# Patient Record
Sex: FEMALE | Race: WHITE | NOT HISPANIC OR LATINO | URBAN - METROPOLITAN AREA
[De-identification: names, ages, dates, MRNs, and addresses within clinical notes are randomized per-mention and may not be internally consistent; named-entity substitution may affect disease eponyms.]

---

## 2018-05-30 ENCOUNTER — EMERGENCY (EMERGENCY)
Facility: HOSPITAL | Age: 78
LOS: 1 days | Discharge: HOME | End: 2018-05-30
Attending: EMERGENCY MEDICINE | Admitting: EMERGENCY MEDICINE
Payer: MEDICARE

## 2018-05-30 VITALS
SYSTOLIC BLOOD PRESSURE: 152 MMHG | HEIGHT: 63 IN | WEIGHT: 201.94 LBS | OXYGEN SATURATION: 95 % | HEART RATE: 90 BPM | RESPIRATION RATE: 20 BRPM | TEMPERATURE: 98 F | DIASTOLIC BLOOD PRESSURE: 85 MMHG

## 2018-05-30 DIAGNOSIS — E03.9 HYPOTHYROIDISM, UNSPECIFIED: ICD-10-CM

## 2018-05-30 DIAGNOSIS — J98.8 OTHER SPECIFIED RESPIRATORY DISORDERS: ICD-10-CM

## 2018-05-30 DIAGNOSIS — R06.2 WHEEZING: ICD-10-CM

## 2018-05-30 DIAGNOSIS — Z88.0 ALLERGY STATUS TO PENICILLIN: ICD-10-CM

## 2018-05-30 DIAGNOSIS — R05 COUGH: ICD-10-CM

## 2018-05-30 DIAGNOSIS — E78.00 PURE HYPERCHOLESTEROLEMIA, UNSPECIFIED: ICD-10-CM

## 2018-05-30 DIAGNOSIS — Z79.899 OTHER LONG TERM (CURRENT) DRUG THERAPY: ICD-10-CM

## 2018-05-30 DIAGNOSIS — F17.210 NICOTINE DEPENDENCE, CIGARETTES, UNCOMPLICATED: ICD-10-CM

## 2018-05-30 LAB
ALBUMIN SERPL ELPH-MCNC: 4.5 G/DL — SIGNIFICANT CHANGE UP (ref 3.3–5)
ALP SERPL-CCNC: 83 U/L — SIGNIFICANT CHANGE UP (ref 40–120)
ALT FLD-CCNC: 21 U/L — SIGNIFICANT CHANGE UP (ref 10–45)
ANION GAP SERPL CALC-SCNC: 17 MMOL/L — SIGNIFICANT CHANGE UP (ref 5–17)
AST SERPL-CCNC: 23 U/L — SIGNIFICANT CHANGE UP (ref 10–40)
BASOPHILS NFR BLD AUTO: 0.1 % — SIGNIFICANT CHANGE UP (ref 0–2)
BILIRUB SERPL-MCNC: 0.4 MG/DL — SIGNIFICANT CHANGE UP (ref 0.2–1.2)
BUN SERPL-MCNC: 28 MG/DL — HIGH (ref 7–23)
CALCIUM SERPL-MCNC: 9.4 MG/DL — SIGNIFICANT CHANGE UP (ref 8.4–10.5)
CHLORIDE SERPL-SCNC: 100 MMOL/L — SIGNIFICANT CHANGE UP (ref 96–108)
CO2 SERPL-SCNC: 24 MMOL/L — SIGNIFICANT CHANGE UP (ref 22–31)
CREAT SERPL-MCNC: 0.95 MG/DL — SIGNIFICANT CHANGE UP (ref 0.5–1.3)
EOSINOPHIL NFR BLD AUTO: 0 % — SIGNIFICANT CHANGE UP (ref 0–6)
GLUCOSE SERPL-MCNC: 115 MG/DL — HIGH (ref 70–99)
HCT VFR BLD CALC: 41.6 % — SIGNIFICANT CHANGE UP (ref 34.5–45)
HGB BLD-MCNC: 13.7 G/DL — SIGNIFICANT CHANGE UP (ref 11.5–15.5)
LYMPHOCYTES # BLD AUTO: 12 % — LOW (ref 13–44)
MCHC RBC-ENTMCNC: 30.2 PG — SIGNIFICANT CHANGE UP (ref 27–34)
MCHC RBC-ENTMCNC: 32.9 G/DL — SIGNIFICANT CHANGE UP (ref 32–36)
MCV RBC AUTO: 91.6 FL — SIGNIFICANT CHANGE UP (ref 80–100)
MONOCYTES NFR BLD AUTO: 4.3 % — SIGNIFICANT CHANGE UP (ref 2–14)
NEUTROPHILS NFR BLD AUTO: 83.6 % — HIGH (ref 43–77)
PLATELET # BLD AUTO: 336 K/UL — SIGNIFICANT CHANGE UP (ref 150–400)
POTASSIUM SERPL-MCNC: 4.5 MMOL/L — SIGNIFICANT CHANGE UP (ref 3.5–5.3)
POTASSIUM SERPL-SCNC: 4.5 MMOL/L — SIGNIFICANT CHANGE UP (ref 3.5–5.3)
PROT SERPL-MCNC: 8.7 G/DL — HIGH (ref 6–8.3)
RBC # BLD: 4.54 M/UL — SIGNIFICANT CHANGE UP (ref 3.8–5.2)
RBC # FLD: 13.5 % — SIGNIFICANT CHANGE UP (ref 10.3–16.9)
SODIUM SERPL-SCNC: 141 MMOL/L — SIGNIFICANT CHANGE UP (ref 135–145)
WBC # BLD: 12.9 K/UL — HIGH (ref 3.8–10.5)
WBC # FLD AUTO: 12.9 K/UL — HIGH (ref 3.8–10.5)

## 2018-05-30 PROCEDURE — 71046 X-RAY EXAM CHEST 2 VIEWS: CPT | Mod: 26

## 2018-05-30 PROCEDURE — 94640 AIRWAY INHALATION TREATMENT: CPT

## 2018-05-30 PROCEDURE — 99284 EMERGENCY DEPT VISIT MOD MDM: CPT | Mod: 25

## 2018-05-30 PROCEDURE — 80053 COMPREHEN METABOLIC PANEL: CPT

## 2018-05-30 PROCEDURE — 96374 THER/PROPH/DIAG INJ IV PUSH: CPT

## 2018-05-30 PROCEDURE — 99285 EMERGENCY DEPT VISIT HI MDM: CPT | Mod: 25

## 2018-05-30 PROCEDURE — 93005 ELECTROCARDIOGRAM TRACING: CPT

## 2018-05-30 PROCEDURE — 85025 COMPLETE CBC W/AUTO DIFF WBC: CPT

## 2018-05-30 PROCEDURE — 71046 X-RAY EXAM CHEST 2 VIEWS: CPT

## 2018-05-30 PROCEDURE — 93010 ELECTROCARDIOGRAM REPORT: CPT

## 2018-05-30 RX ORDER — IPRATROPIUM/ALBUTEROL SULFATE 18-103MCG
3 AEROSOL WITH ADAPTER (GRAM) INHALATION
Qty: 0 | Refills: 0 | Status: COMPLETED | OUTPATIENT
Start: 2018-05-30 | End: 2018-05-30

## 2018-05-30 RX ORDER — ALBUTEROL 90 UG/1
1 AEROSOL, METERED ORAL
Qty: 28 | Refills: 0
Start: 2018-05-30 | End: 2018-06-05

## 2018-05-30 RX ADMIN — Medication 3 MILLILITER(S): at 12:12

## 2018-05-30 RX ADMIN — Medication 3 MILLILITER(S): at 12:35

## 2018-05-30 RX ADMIN — Medication 125 MILLIGRAM(S): at 11:20

## 2018-05-30 RX ADMIN — Medication 3 MILLILITER(S): at 11:20

## 2018-05-30 NOTE — ED PROVIDER NOTE - MEDICAL DECISION MAKING DETAILS
76 yo female with 2 weeks of wheezing, cough, already has taken steroids and zpack without improvement.  will get cxr, labs, give steroids and nebs 76 yo female with 2 weeks of wheezing, cough, already has taken steroids and zpack without improvement.  will get cxr, labs, give steroids and nebs.  cxr clear, much clearer after nebs.  will give nebulizers and steroids, fu pmd

## 2018-05-30 NOTE — ED PROVIDER NOTE - OBJECTIVE STATEMENT
2 weeks of cough with yellow sputum, wheezing  went to urgent care, was given inhaler, steroids and zpack  finished all those but not feeling any better  no fever

## 2018-05-30 NOTE — ED ADULT TRIAGE NOTE - CHIEF COMPLAINT QUOTE
Patient c/o productive  cough with wheezing, upper back  and sob for 2 weeks . Denies any chest pain , fever nor chills . Was sent from urgent care for evaluation .

## 2018-09-12 NOTE — ED ADULT NURSE NOTE - CAS ELECT INFOMATION PROVIDED
Pt. Contacted , text was thru automated system .   ----- Message from Anny Guzman sent at 9/12/2018  8:19 AM CDT -----  Contact: Self  Patient got a text for an earlier appt     Please call back [phone] (home)       
DC instructions

## 2020-03-10 PROBLEM — E03.9 HYPOTHYROIDISM, UNSPECIFIED: Chronic | Status: ACTIVE | Noted: 2018-05-30

## 2020-03-10 PROBLEM — E78.00 PURE HYPERCHOLESTEROLEMIA, UNSPECIFIED: Chronic | Status: ACTIVE | Noted: 2018-05-30

## 2020-03-11 NOTE — DISCUSSION/SUMMARY
[de-identified] : The patient is a 79 year old woman presenting with\par \par Imaging was reviewed with the patient in detail.  All questions were answered appropriately.\par

## 2020-03-11 NOTE — PHYSICAL EXAM
[de-identified] : General: Well-nourished, well-developed, alert, and in no acute distress.\par Head: Normocephalic.\par Eyes: Pupils equal round reactive to light and accommodation, extraocular muscles intact, normal sclera.\par Nose: No nasal discharge.\par Cardiac: Regular rate. Extremities are warm and well perfused. Distal pulses are symmetric bilaterally.\par Respiratory: No labored breathing.\par Extremities: Sensation is intact distally bilaterally.  Distal pulses are symmetric bilaterally\par Neurologic: No focal deficits\par Skin: Normal skin color, texture, and turgor\par Psychiatric: Normal affect\par \par RIGHT KNEE:\par \par Inspection: no bruising, swelling, erythema\par Joint Effusion:no \par ROM: Knee Flexion 130-140 , Knee Extension 0\par Palpation:No pain at joint line, patellar tendon, MFC/LFC, Medial/Lateral Tibial Plateau\par Leg Length Discrepancy:no\par Patella: no apprehension\par Distal Pulses: normal\par Lower Extremity Strength:normal, 5/5 \par Lower Extremity Reflexes:normal, 2+\par Lower Extremity Sensation: normal\par \par Special Tests:\par [facility]:Negative \par [place]: Negative\par Anterior Drawer:Negative\par Anterior Lachman:Negative\par Posterior Drawer:Negative \par Varus/Valgus:Negative, no instability\par \par LEFT KNEE:\par \par Inspection: no bruising, swelling, erythema\par Joint Effusion:no \par ROM: Knee Flexion 130-140 , Knee Extension 0\par Palpation:No pain at joint line, patellar tendon, MFC/LFC, Medial/Lateral Tibial Plateau\par Leg Length Discrepancy:no\par Patella: no apprehension\par Distal Pulses: normal\par Lower Extremity Strength:normal, 5/5 \par Lower Extremity Reflexes:normal, 2+\par Lower Extremity Sensation: normal\par \par Special Tests:\par [facility]:Negative \par [place]: Negative\par Anterior Drawer:Negative\par Anterior Lachman:Negative\par Posterior Drawer:Negative \par Varus/Valgus:Negative, no instability\par  [de-identified] : Xray Bilateral Knees - Multiple views were reviewed with the patient in detail.  There is no acute fracture or dislocation.  There is no joint effusion.  Joint spaces are preserved.\par \par

## 2020-03-13 ENCOUNTER — APPOINTMENT (OUTPATIENT)
Dept: ORTHOPEDIC SURGERY | Facility: CLINIC | Age: 80
End: 2020-03-13

## 2021-09-05 ENCOUNTER — TRANSCRIPTION ENCOUNTER (OUTPATIENT)
Age: 81
End: 2021-09-05

## 2021-12-02 ENCOUNTER — TRANSCRIPTION ENCOUNTER (OUTPATIENT)
Age: 81
End: 2021-12-02

## 2022-10-05 ENCOUNTER — INPATIENT (INPATIENT)
Facility: HOSPITAL | Age: 82
LOS: 2 days | Discharge: HOME | End: 2022-10-08
Attending: STUDENT IN AN ORGANIZED HEALTH CARE EDUCATION/TRAINING PROGRAM | Admitting: STUDENT IN AN ORGANIZED HEALTH CARE EDUCATION/TRAINING PROGRAM

## 2022-10-05 VITALS
HEART RATE: 95 BPM | SYSTOLIC BLOOD PRESSURE: 128 MMHG | TEMPERATURE: 97 F | OXYGEN SATURATION: 98 % | WEIGHT: 207.01 LBS | DIASTOLIC BLOOD PRESSURE: 56 MMHG | RESPIRATION RATE: 17 BRPM | HEIGHT: 63 IN

## 2022-10-05 DIAGNOSIS — K29.71 GASTRITIS, UNSPECIFIED, WITH BLEEDING: ICD-10-CM

## 2022-10-05 DIAGNOSIS — K57.31 DIVERTICULOSIS OF LARGE INTESTINE WITHOUT PERFORATION OR ABSCESS WITH BLEEDING: ICD-10-CM

## 2022-10-05 DIAGNOSIS — K20.81 OTHER ESOPHAGITIS WITH BLEEDING: ICD-10-CM

## 2022-10-05 PROCEDURE — 99285 EMERGENCY DEPT VISIT HI MDM: CPT | Mod: GC

## 2022-10-05 NOTE — ED ADULT TRIAGE NOTE - HEIGHT IN INCHES
Already refused per previous tele encounter. Please refuse medication. Note added to pharmacy that medication needs to be refilled by PCP   3

## 2022-10-05 NOTE — ED ADULT TRIAGE NOTE - CHIEF COMPLAINT QUOTE
as per daughter "my mom is very weak we went to see her cardiologist and her hemoglobin is 7 and blood in stomach, [facility] and was told to come here" as per daughter "my mom is very weak we went to see her cardiologist and her hemoglobin is 7 and blood in stomach, [facility] and was told to come here" denies vomiting denies blood in stool denies trauma,

## 2022-10-06 LAB
ABO RH CONFIRMATION: SIGNIFICANT CHANGE UP
ALBUMIN SERPL ELPH-MCNC: 3.9 G/DL — SIGNIFICANT CHANGE UP (ref 3.5–5.2)
ALBUMIN SERPL ELPH-MCNC: 4 G/DL — SIGNIFICANT CHANGE UP (ref 3.5–5.2)
ALP SERPL-CCNC: 90 U/L — SIGNIFICANT CHANGE UP (ref 30–115)
ALP SERPL-CCNC: 96 U/L — SIGNIFICANT CHANGE UP (ref 30–115)
ALT FLD-CCNC: 11 U/L — SIGNIFICANT CHANGE UP (ref 0–41)
ALT FLD-CCNC: 12 U/L — SIGNIFICANT CHANGE UP (ref 0–41)
ANION GAP SERPL CALC-SCNC: 10 MMOL/L — SIGNIFICANT CHANGE UP (ref 7–14)
ANION GAP SERPL CALC-SCNC: 16 MMOL/L — HIGH (ref 7–14)
APTT BLD: 52.8 SEC — HIGH (ref 27–39.2)
AST SERPL-CCNC: 16 U/L — SIGNIFICANT CHANGE UP (ref 0–41)
AST SERPL-CCNC: 16 U/L — SIGNIFICANT CHANGE UP (ref 0–41)
BASOPHILS # BLD AUTO: 0.04 K/UL — SIGNIFICANT CHANGE UP (ref 0–0.2)
BASOPHILS # BLD AUTO: 0.04 K/UL — SIGNIFICANT CHANGE UP (ref 0–0.2)
BASOPHILS # BLD AUTO: 0.05 K/UL — SIGNIFICANT CHANGE UP (ref 0–0.2)
BASOPHILS NFR BLD AUTO: 0.4 % — SIGNIFICANT CHANGE UP (ref 0–1)
BASOPHILS NFR BLD AUTO: 0.5 % — SIGNIFICANT CHANGE UP (ref 0–1)
BASOPHILS NFR BLD AUTO: 0.5 % — SIGNIFICANT CHANGE UP (ref 0–1)
BILIRUB SERPL-MCNC: 0.3 MG/DL — SIGNIFICANT CHANGE UP (ref 0.2–1.2)
BILIRUB SERPL-MCNC: 1 MG/DL — SIGNIFICANT CHANGE UP (ref 0.2–1.2)
BLD GP AB SCN SERPL QL: SIGNIFICANT CHANGE UP
BUN SERPL-MCNC: 20 MG/DL — SIGNIFICANT CHANGE UP (ref 10–20)
BUN SERPL-MCNC: 23 MG/DL — HIGH (ref 10–20)
CALCIUM SERPL-MCNC: 8.5 MG/DL — SIGNIFICANT CHANGE UP (ref 8.4–10.5)
CALCIUM SERPL-MCNC: 9.2 MG/DL — SIGNIFICANT CHANGE UP (ref 8.4–10.5)
CHLORIDE SERPL-SCNC: 102 MMOL/L — SIGNIFICANT CHANGE UP (ref 98–110)
CHLORIDE SERPL-SCNC: 103 MMOL/L — SIGNIFICANT CHANGE UP (ref 98–110)
CHOLEST SERPL-MCNC: 162 MG/DL — SIGNIFICANT CHANGE UP
CO2 SERPL-SCNC: 25 MMOL/L — SIGNIFICANT CHANGE UP (ref 17–32)
CO2 SERPL-SCNC: 25 MMOL/L — SIGNIFICANT CHANGE UP (ref 17–32)
CREAT SERPL-MCNC: 0.8 MG/DL — SIGNIFICANT CHANGE UP (ref 0.7–1.5)
CREAT SERPL-MCNC: 0.8 MG/DL — SIGNIFICANT CHANGE UP (ref 0.7–1.5)
D DIMER BLD IA.RAPID-MCNC: 405 NG/ML DDU — HIGH (ref 0–230)
EGFR: 74 ML/MIN/1.73M2 — SIGNIFICANT CHANGE UP
EGFR: 74 ML/MIN/1.73M2 — SIGNIFICANT CHANGE UP
EOSINOPHIL # BLD AUTO: 0.11 K/UL — SIGNIFICANT CHANGE UP (ref 0–0.7)
EOSINOPHIL # BLD AUTO: 0.11 K/UL — SIGNIFICANT CHANGE UP (ref 0–0.7)
EOSINOPHIL # BLD AUTO: 0.14 K/UL — SIGNIFICANT CHANGE UP (ref 0–0.7)
EOSINOPHIL NFR BLD AUTO: 1.1 % — SIGNIFICANT CHANGE UP (ref 0–8)
EOSINOPHIL NFR BLD AUTO: 1.1 % — SIGNIFICANT CHANGE UP (ref 0–8)
EOSINOPHIL NFR BLD AUTO: 1.7 % — SIGNIFICANT CHANGE UP (ref 0–8)
GLUCOSE SERPL-MCNC: 111 MG/DL — HIGH (ref 70–99)
GLUCOSE SERPL-MCNC: 91 MG/DL — SIGNIFICANT CHANGE UP (ref 70–99)
HCT VFR BLD CALC: 23.6 % — LOW (ref 37–47)
HCT VFR BLD CALC: 25.1 % — LOW (ref 37–47)
HCT VFR BLD CALC: 27.2 % — LOW (ref 37–47)
HDLC SERPL-MCNC: 37 MG/DL — LOW
HGB BLD-MCNC: 6.6 G/DL — CRITICAL LOW (ref 12–16)
HGB BLD-MCNC: 7.5 G/DL — LOW (ref 12–16)
HGB BLD-MCNC: 7.9 G/DL — LOW (ref 12–16)
IMM GRANULOCYTES NFR BLD AUTO: 0.4 % — HIGH (ref 0.1–0.3)
IMM GRANULOCYTES NFR BLD AUTO: 0.5 % — HIGH (ref 0.1–0.3)
IMM GRANULOCYTES NFR BLD AUTO: 0.8 % — HIGH (ref 0.1–0.3)
INR BLD: 1.25 RATIO — SIGNIFICANT CHANGE UP (ref 0.65–1.3)
LIDOCAIN IGE QN: 169 U/L — HIGH (ref 7–60)
LIPID PNL WITH DIRECT LDL SERPL: 88 MG/DL — SIGNIFICANT CHANGE UP
LYMPHOCYTES # BLD AUTO: 1.8 K/UL — SIGNIFICANT CHANGE UP (ref 1.2–3.4)
LYMPHOCYTES # BLD AUTO: 1.89 K/UL — SIGNIFICANT CHANGE UP (ref 1.2–3.4)
LYMPHOCYTES # BLD AUTO: 18.8 % — LOW (ref 20.5–51.1)
LYMPHOCYTES # BLD AUTO: 2.07 K/UL — SIGNIFICANT CHANGE UP (ref 1.2–3.4)
LYMPHOCYTES # BLD AUTO: 21.2 % — SIGNIFICANT CHANGE UP (ref 20.5–51.1)
LYMPHOCYTES # BLD AUTO: 22.4 % — SIGNIFICANT CHANGE UP (ref 20.5–51.1)
MAGNESIUM SERPL-MCNC: 2.2 MG/DL — SIGNIFICANT CHANGE UP (ref 1.8–2.4)
MCHC RBC-ENTMCNC: 21.2 PG — LOW (ref 27–31)
MCHC RBC-ENTMCNC: 22.4 PG — LOW (ref 27–31)
MCHC RBC-ENTMCNC: 23 PG — LOW (ref 27–31)
MCHC RBC-ENTMCNC: 28 G/DL — LOW (ref 32–37)
MCHC RBC-ENTMCNC: 29 G/DL — LOW (ref 32–37)
MCHC RBC-ENTMCNC: 29.9 G/DL — LOW (ref 32–37)
MCV RBC AUTO: 75.6 FL — LOW (ref 81–99)
MCV RBC AUTO: 77 FL — LOW (ref 81–99)
MCV RBC AUTO: 77.1 FL — LOW (ref 81–99)
MONOCYTES # BLD AUTO: 0.67 K/UL — HIGH (ref 0.1–0.6)
MONOCYTES # BLD AUTO: 0.67 K/UL — HIGH (ref 0.1–0.6)
MONOCYTES # BLD AUTO: 0.81 K/UL — HIGH (ref 0.1–0.6)
MONOCYTES NFR BLD AUTO: 6.9 % — SIGNIFICANT CHANGE UP (ref 1.7–9.3)
MONOCYTES NFR BLD AUTO: 7 % — SIGNIFICANT CHANGE UP (ref 1.7–9.3)
MONOCYTES NFR BLD AUTO: 9.6 % — HIGH (ref 1.7–9.3)
NEUTROPHILS # BLD AUTO: 5.51 K/UL — SIGNIFICANT CHANGE UP (ref 1.4–6.5)
NEUTROPHILS # BLD AUTO: 6.79 K/UL — HIGH (ref 1.4–6.5)
NEUTROPHILS # BLD AUTO: 6.93 K/UL — HIGH (ref 1.4–6.5)
NEUTROPHILS NFR BLD AUTO: 65.3 % — SIGNIFICANT CHANGE UP (ref 42.2–75.2)
NEUTROPHILS NFR BLD AUTO: 69.6 % — SIGNIFICANT CHANGE UP (ref 42.2–75.2)
NEUTROPHILS NFR BLD AUTO: 72.2 % — SIGNIFICANT CHANGE UP (ref 42.2–75.2)
NON HDL CHOLESTEROL: 125 MG/DL — SIGNIFICANT CHANGE UP
NRBC # BLD: 0 /100 WBCS — SIGNIFICANT CHANGE UP (ref 0–0)
PLATELET # BLD AUTO: 322 K/UL — SIGNIFICANT CHANGE UP (ref 130–400)
PLATELET # BLD AUTO: 404 K/UL — HIGH (ref 130–400)
PLATELET # BLD AUTO: 427 K/UL — HIGH (ref 130–400)
POTASSIUM SERPL-MCNC: 4.2 MMOL/L — SIGNIFICANT CHANGE UP (ref 3.5–5)
POTASSIUM SERPL-MCNC: 4.5 MMOL/L — SIGNIFICANT CHANGE UP (ref 3.5–5)
POTASSIUM SERPL-SCNC: 4.2 MMOL/L — SIGNIFICANT CHANGE UP (ref 3.5–5)
POTASSIUM SERPL-SCNC: 4.5 MMOL/L — SIGNIFICANT CHANGE UP (ref 3.5–5)
PROT SERPL-MCNC: 6.4 G/DL — SIGNIFICANT CHANGE UP (ref 6–8)
PROT SERPL-MCNC: 6.6 G/DL — SIGNIFICANT CHANGE UP (ref 6–8)
PROTHROM AB SERPL-ACNC: 14.4 SEC — HIGH (ref 9.95–12.87)
RBC # BLD: 3.12 M/UL — LOW (ref 4.2–5.4)
RBC # BLD: 3.26 M/UL — LOW (ref 4.2–5.4)
RBC # BLD: 3.53 M/UL — LOW (ref 4.2–5.4)
RBC # FLD: 17.3 % — HIGH (ref 11.5–14.5)
RBC # FLD: 18.4 % — HIGH (ref 11.5–14.5)
RBC # FLD: 19 % — HIGH (ref 11.5–14.5)
SARS-COV-2 RNA SPEC QL NAA+PROBE: SIGNIFICANT CHANGE UP
SODIUM SERPL-SCNC: 138 MMOL/L — SIGNIFICANT CHANGE UP (ref 135–146)
SODIUM SERPL-SCNC: 143 MMOL/L — SIGNIFICANT CHANGE UP (ref 135–146)
TRIGL SERPL-MCNC: 181 MG/DL — HIGH
TROPONIN T SERPL-MCNC: <0.01 NG/ML — SIGNIFICANT CHANGE UP
TSH SERPL-MCNC: 0.04 UIU/ML — LOW (ref 0.27–4.2)
WBC # BLD: 8.43 K/UL — SIGNIFICANT CHANGE UP (ref 4.8–10.8)
WBC # BLD: 9.6 K/UL — SIGNIFICANT CHANGE UP (ref 4.8–10.8)
WBC # BLD: 9.76 K/UL — SIGNIFICANT CHANGE UP (ref 4.8–10.8)
WBC # FLD AUTO: 8.43 K/UL — SIGNIFICANT CHANGE UP (ref 4.8–10.8)
WBC # FLD AUTO: 9.6 K/UL — SIGNIFICANT CHANGE UP (ref 4.8–10.8)
WBC # FLD AUTO: 9.76 K/UL — SIGNIFICANT CHANGE UP (ref 4.8–10.8)

## 2022-10-06 PROCEDURE — 93010 ELECTROCARDIOGRAM REPORT: CPT

## 2022-10-06 PROCEDURE — 99221 1ST HOSP IP/OBS SF/LOW 40: CPT

## 2022-10-06 PROCEDURE — 99223 1ST HOSP IP/OBS HIGH 75: CPT

## 2022-10-06 PROCEDURE — 71046 X-RAY EXAM CHEST 2 VIEWS: CPT | Mod: 26

## 2022-10-06 RX ORDER — LEVOTHYROXINE SODIUM 125 MCG
0 TABLET ORAL
Qty: 0 | Refills: 0 | DISCHARGE

## 2022-10-06 RX ORDER — LEVOTHYROXINE SODIUM 125 MCG
125 TABLET ORAL DAILY
Refills: 0 | Status: DISCONTINUED | OUTPATIENT
Start: 2022-10-06 | End: 2022-10-08

## 2022-10-06 RX ORDER — AMLODIPINE BESYLATE 2.5 MG/1
5 TABLET ORAL DAILY
Refills: 0 | Status: DISCONTINUED | OUTPATIENT
Start: 2022-10-06 | End: 2022-10-08

## 2022-10-06 RX ORDER — PANTOPRAZOLE SODIUM 20 MG/1
80 TABLET, DELAYED RELEASE ORAL ONCE
Refills: 0 | Status: COMPLETED | OUTPATIENT
Start: 2022-10-06 | End: 2022-10-06

## 2022-10-06 RX ORDER — ROSUVASTATIN CALCIUM 5 MG/1
1 TABLET ORAL
Qty: 0 | Refills: 0 | DISCHARGE

## 2022-10-06 RX ORDER — PANTOPRAZOLE SODIUM 20 MG/1
40 TABLET, DELAYED RELEASE ORAL
Refills: 0 | Status: DISCONTINUED | OUTPATIENT
Start: 2022-10-06 | End: 2022-10-07

## 2022-10-06 RX ORDER — ATORVASTATIN CALCIUM 80 MG/1
40 TABLET, FILM COATED ORAL AT BEDTIME
Refills: 0 | Status: DISCONTINUED | OUTPATIENT
Start: 2022-10-06 | End: 2022-10-08

## 2022-10-06 RX ORDER — CHLORHEXIDINE GLUCONATE 213 G/1000ML
1 SOLUTION TOPICAL
Refills: 0 | Status: DISCONTINUED | OUTPATIENT
Start: 2022-10-06 | End: 2022-10-08

## 2022-10-06 RX ORDER — PANTOPRAZOLE SODIUM 20 MG/1
8 TABLET, DELAYED RELEASE ORAL
Qty: 80 | Refills: 0 | Status: DISCONTINUED | OUTPATIENT
Start: 2022-10-06 | End: 2022-10-06

## 2022-10-06 RX ORDER — METOPROLOL TARTRATE 50 MG
50 TABLET ORAL DAILY
Refills: 0 | Status: DISCONTINUED | OUTPATIENT
Start: 2022-10-06 | End: 2022-10-08

## 2022-10-06 RX ORDER — FERROUS SULFATE 325(65) MG
325 TABLET ORAL
Refills: 0 | Status: DISCONTINUED | OUTPATIENT
Start: 2022-10-06 | End: 2022-10-08

## 2022-10-06 RX ORDER — SOD SULF/SODIUM/NAHCO3/KCL/PEG
4000 SOLUTION, RECONSTITUTED, ORAL ORAL ONCE
Refills: 0 | Status: COMPLETED | OUTPATIENT
Start: 2022-10-06 | End: 2022-10-06

## 2022-10-06 RX ADMIN — Medication 4000 MILLILITER(S): at 10:57

## 2022-10-06 RX ADMIN — PANTOPRAZOLE SODIUM 40 MILLIGRAM(S): 20 TABLET, DELAYED RELEASE ORAL at 21:23

## 2022-10-06 RX ADMIN — Medication 50 MILLIGRAM(S): at 10:38

## 2022-10-06 RX ADMIN — PANTOPRAZOLE SODIUM 80 MILLIGRAM(S): 20 TABLET, DELAYED RELEASE ORAL at 02:40

## 2022-10-06 RX ADMIN — PANTOPRAZOLE SODIUM 10 MG/HR: 20 TABLET, DELAYED RELEASE ORAL at 07:03

## 2022-10-06 RX ADMIN — AMLODIPINE BESYLATE 5 MILLIGRAM(S): 2.5 TABLET ORAL at 10:38

## 2022-10-06 RX ADMIN — Medication 325 MILLIGRAM(S): at 18:01

## 2022-10-06 NOTE — ED PROVIDER NOTE - NS ED ROS FT
Constitutional: +Generalized weakness. No fevers, chills, or malaise.  HEENT: No headache, visual changes, eye pain, hearing changes, running nose, or sore throat.  Cardiac:  +chest pain, +diaphoresis, +leg edema. No leg pain.  Respiratory:  +SOB, +cough. No respiratory distress, or hemoptysis.  GI:  No nausea, vomiting, diarrhea, or abdominal pain.  :  No dysuria, frequency, or urgency.  MS:  No myalgia, joint pain or back pain.  Neuro: +dizziness. No LOC, paralysis, or N/T.  Skin:  No skin rash.

## 2022-10-06 NOTE — ED PROCEDURE NOTE - ATTENDING CONTRIBUTION TO CARE
I was present for and supervised the key/critical aspects of the procedures performed during the care of the patient.  I personally supervised the study. I reviewed the images and interpretation by the ACP/Resident and have edited where appropriate.

## 2022-10-06 NOTE — ED ADULT NURSE REASSESSMENT NOTE - NS ED NURSE REASSESS COMMENT FT1
Pt continuing to take the anahy, tolerating well.  Using bathroom regularly.   No distress noted, ambulating well.

## 2022-10-06 NOTE — H&P ADULT - ATTENDING COMMENTS
EKG reviewed normal sinus rhythm  CXR reviewed no bilateral opacity or effusion    PHYSICAL EXAM:  General Appearance: NAD, pallor, normal for age and gender. 	  Neck: Normal JVP, no bruit.   Eyes: Conjunctiva clear, Extra Ocular muscles intact. No scleral icterus.  ENMT: Moist oral mucosa. No oral lesion.  Cardiovascular: Irregularly irregular, S1 S2, No JVD, No murmurs.  Respiratory: Lungs clear to auscultation. No wheezes, rales or rhonchi.  Psychiatry: Alert and oriented x 3, Mood & affect appropriate.  Gastrointestinal:  Soft, Non-tender, Non-distended.  Neurologic: Non-focal deficits.  Musculoskeletal/ extremities: Normal range of motion, No clubbing, cyanosis or edema.  Vascular: Peripheral pulses palpable bilaterally.  Skin/Integumen: No rashes, No ecchymoses, No cyanosis.    # GI Bleed, likely upper  # Anemia due to blood loss  - transfuse 1 unit prbc  - keep active type and screen and Hb>8  - GI evaluation for EGD/Colonoscopy  - c/w IV PPI    # Paroxysmal Afib, rate controlled   - xarelto on hold due to GI bleed  - c/w toprol xl     # HTN, stable  - c/w amlodipine, toprol xl daily    # Hypothyroidism   - c/w levothyroxine 125 mcg daily    # DVT prophylaxis - on SCD  # GI prophylaxis - on protonix  # Code status - Full Code

## 2022-10-06 NOTE — H&P ADULT - HISTORY OF PRESENT ILLNESS
81 year old woman with past medical history of A fib (on xeralto), HTN, and hypothyroidism presents for anemia. For the past 2 months the patient was feeling short of breath and fatigued. Normally she can walk long distances but recently she becomes short of breathe and diaphoretic after walking even 2 steps. She also reports dark stools. On Monday, her cardiologist called her to tell her that her Hgb was 7 and to stop the Xeralto and prescribed Ferrous Sulfate for her. She denies fever, chest pain, nausea, vomiting, diarrhea, or constipation. She does endorse a cough productive of yellow sputum.    In the ED:  Vitals: /56, HR 95, RR 17, T 96.7, SpO2 98% RA  Labs: WBC 9.76, Hgb 6.6, MCV 75.6, Cr 0.8, BUN 23, Lipase 169  Patient receiving 1 unit PRBC during time of interview and was given 80 mg Protonix IV and started on Protonix drip. Digital rectal exam was positive for blood.

## 2022-10-06 NOTE — ED ADULT NURSE REASSESSMENT NOTE - NS ED NURSE REASSESS COMMENT FT1
Pt received from outgoing shift at 1900. Pt is calmly resting in bed at this time. Pt is alert and oriented x3. No s/s of respiratory distress. Pt is able to make all needs known. No further complaints at this time. Pt is admitted to Medicine service. Pending bed status. Safety and comfort measures in place. Fall precautions in place as per hospital policy. Will continue to monitor and assess for changes.

## 2022-10-06 NOTE — H&P ADULT - ASSESSMENT
81 year old woman with past medical history of A fib (on xeralto), HTN, and hypothyroidism presents for anemia.    #Microcytic anemia (Hgb 6.6 on admission)   - Given patient's history of black stools and positive DR exam, the source of bleeding is most likely GI  - MCV 75.6 also suggests iron deficiency anemia which would be consistent with GI bleed.   - Patient will likely need to have colonoscopy   - currently receiving 1 unit of PRBC  - fu CBC at 11 AM   - fu GI recs   - Hold Xeralto  - c/w ferrous sulfate.  - c/w protonix (consider changing to BID as evidence does not show benefit of drip over BID).    #Chronic Afib   - Hold Xeralto in setting of anemia    #HTN  - C/w amlodipine 5 mg daily    #Hypothyroidism   - c/w levothyroxine 125 ug daily    DVT PPX: sequentials   GI PPX: Protonix  Activity: Ambulate as tolerated  Diet: Regular  Code: Patient asked for more time to think about it.

## 2022-10-06 NOTE — ED ADULT NURSE NOTE - OBJECTIVE STATEMENT
as per daughter "my mom is very weak we went to see her cardiologist and her hemoglobin is 7 and blood in stomach, [facility] and [facility] to come here" denies vomiting denies blood in stool denies trauma,

## 2022-10-06 NOTE — ED PROVIDER NOTE - NSICDXPASTMEDICALHX_GEN_ALL_CORE_FT
PAST MEDICAL HISTORY:  Atrial fibrillation     HTN (hypertension)     Hypercholesteremia     Hypothyroid

## 2022-10-06 NOTE — ED PROVIDER NOTE - OBJECTIVE STATEMENT
82 y/o female, with PMHx of HTN, Afib on Xarelto, hypothyroidism, presents to the ED with worsening generalized weakness onset 2 months ago after starting Xarelto for A-fib. The patient was called by her cardiologist 2 days ago and was told her hemoglobin was 7 and the Xarelto was stopped then. The patient also endorses recent SOB with minimal exertion, black stools, diaphoresis, dizziness, short sharp episodes of chest pain, dry cough, and LE swelling. Denies fever, chills, nausea, vomiting, diarrhea, abd pain, LE pain. 82 y/o female, with PMHx of HTN, Afib on Xarelto, hypothyroidism, presents to the ED with worsening generalized weakness onset 2 months ago after starting Xarelto for A-fib. The patient was called by her cardiologist ([facility]) 2 days ago and was told her hemoglobin was 7 and the Xarelto was stopped then. The patient also endorses recent SOB with minimal exertion, black stools, diaphoresis, dizziness, short sharp episodes of chest pain, dry cough, and LE swelling. Denies fever, chills, nausea, vomiting, diarrhea, abd pain, LE pain.

## 2022-10-06 NOTE — H&P ADULT - NSHPLABSRESULTS_GEN_ALL_CORE
LABS:                        7.9    9.60  )-----------( 427      ( 06 Oct 2022 11:26 )             27.2     10-06    143  |  102  |  20  ----------------------------<  111<H>  4.5   |  25  |  0.8    Ca    9.2      06 Oct 2022 11:26  Mg     2.2     10-06    TPro  6.6  /  Alb  3.9  /  TBili  1.0  /  DBili  x   /  AST  16  /  ALT  12  /  AlkPhos  96  10-06    PT/INR - ( 06 Oct 2022 02:05 )   PT: 14.40 sec;   INR: 1.25 ratio         PTT - ( 06 Oct 2022 02:05 )  PTT:52.8 sec      RADIOLOGY & ADDITIONAL TESTS:  < from: Xray Chest 2 Views PA/Lat (10.06.22 @ 01:33) >    Impression:    No acute cardiopulmonary abnormality.    1.1 cm nodular opacity at the left upper lung, stable since May 2018.

## 2022-10-06 NOTE — ED ADULT NURSE NOTE - DO YOU HAVE ACCESS TO A FIREARM WITHIN OR OUTSIDE YOUR HOUSEHOLD?
----- Message from Dominic Wynn MD sent at 2/6/2017  3:14 PM CST -----  Please advise patient that his kidney function is rising badly.  Check to see if he has already been advised of this result but I think he should go to the ER for further evaluation. This should not wait for tomorrow.   No

## 2022-10-06 NOTE — CONSULT NOTE ADULT - SUBJECTIVE AND OBJECTIVE BOX
Cardiologist: Dr. Fuchs    HPI:  81 year old woman with past medical history of A fib (on xeralto), HTN, and hypothyroidism presents for anemia. For the past 2 months the patient was feeling short of breath and fatigued. Normally she can walk long distances but recently she becomes short of breathe and diaphoretic after walking even 2 steps. She also reports dark stools. On Monday, her cardiologist called her to tell her that her Hgb was 7 and to stop the Xeralto and prescribed Ferrous Sulfate for her. She denies fever, chest pain, nausea, vomiting, diarrhea, or constipation. She does endorse a cough productive of yellow sputum.    In the ED:  Vitals: /56, HR 95, RR 17, T 96.7, SpO2 98% RA  Labs: WBC 9.76, Hgb 6.6, MCV 75.6, Cr 0.8, BUN 23, Lipase 169  Patient receiving 1 unit PRBC during time of interview and was given 80 mg Protonix IV and started on Protonix drip. Digital rectal exam was positive for blood.  (06 Oct 2022 08:10)    Cardiology consulted for pre-endoscopy risk stratification in the setting of GI bleed and patient taking Xarelto. Patient denies chest pain, but endorses some palpitations last night, likely 2/2 to Afib. Patient uses two pillows to sleep at night, denies PND and orthopnea. Endorses LE edema towards the end of the day, none appreciated today on physical exam. Per patient, she underwent stress test 1 month ago with normal results, as well as echo on 10/3, which was also unremarkable. She attends regular followup with Dr. Fuchs.      PAST MEDICAL & SURGICAL HISTORY  Hypercholesteremia    Hypothyroid    Atrial fibrillation    HTN (hypertension)    Appendectomy in remote past        FAMILY HISTORY:  FAMILY HISTORY:    [ ] no pertinent family history of premature cardiovascular disease in first degree relatives.  Mother:   Father:   Siblings:     SOCIAL HISTORY:  []smoker  []Alcohol  []Drug    ALLERGIES:  penicillins (Rash)      MEDICATIONS:  MEDICATIONS  (STANDING):  amLODIPine   Tablet 5 milliGRAM(s) Oral daily  atorvastatin 40 milliGRAM(s) Oral at bedtime  bisacodyl 20 milliGRAM(s) Oral at bedtime  chlorhexidine 2% Cloths 1 Application(s) Topical <User Schedule>  ferrous    sulfate 325 milliGRAM(s) Oral two times a day  levothyroxine 125 MICROGram(s) Oral daily  metoprolol succinate ER 50 milliGRAM(s) Oral daily  pantoprazole  Injectable 40 milliGRAM(s) IV Push two times a day    MEDICATIONS  (PRN):      HOME MEDICATIONS:  Home Medications:  amLODIPine 5 mg oral tablet: 1 tab(s) orally once a day (06 Oct 2022 00:53)  metoprolol succinate 50 mg oral tablet, extended release: 1 tab(s) orally once a day (06 Oct 2022 00:53)      VITALS:   T(F): 97.4 (10-06 @ 09:17), Max: 98.1 (10-06 @ 06:11)  HR: 84 (10-06 @ 09:17) (84 - 96)  BP: 140/58 (10-06 @ 09:17) (108/59 - 140/58)  BP(mean): 89 (10-06 @ 06:38) (89 - 93)  RR: 18 (10-06 @ 09:17) (17 - 20)  SpO2: 99% (10-06 @ 09:17) (96% - 99%)    I&O's Summary      REVIEW OF SYSTEMS:    Negative except as mentioned in HPI    CONSTITUTIONAL: Weakness  EYES: No visual changes  ENT: No vertigo or throat pain   NECK: No pain or stiffness  RESPIRATORY: No cough, no hemoptysis. Patient endorses some SOB and wheezing, not at present.  CARDIOVASCULAR: No chest pain, endorses palpitations yesterday  GASTROINTESTINAL: No abdominal or epigastric pain. No nausea, vomiting, or hematemesis. Endorses melanotic stool.  NEUROLOGICAL: No numbness or weakness  SKIN: No itching, no rashes  MSK: FROM x4    PHYSICAL EXAM:  NEURO: Awake , alert and oriented  GEN: Not in acute distress  NECK: No JVD  LUNGS: Clear to auscultation bilaterally   CARDIOVASCULAR: S1/S2 present, RRR , systolic murmur, no carotid bruits,  + PP bilaterally  ABD: Soft, non-tender, non-distended, +BS  EXT: No BERNIE  SKIN: Intact    LABS:                        6.6    9.76  )-----------( 404      ( 06 Oct 2022 02:05 )             23.6     10-06    138  |  103  |  23<H>  ----------------------------<  91  4.2   |  25  |  0.8    Ca    8.5      06 Oct 2022 02:05    TPro  6.4  /  Alb  4.0  /  TBili  0.3  /  DBili  x   /  AST  16  /  ALT  11  /  AlkPhos  90  10-06    PT/INR - ( 06 Oct 2022 02:05 )   PT: 14.40 sec;   INR: 1.25 ratio         PTT - ( 06 Oct 2022 02:05 )  PTT:52.8 sec  Troponin T, Serum: <0.01 ng/mL (10-06-22 @ 02:05)    CARDIAC MARKERS ( 06 Oct 2022 02:05 )  x     / <0.01 ng/mL / x     / x     / x            Troponin trend:    see above      RADIOLOGY:  -CXR: < from: Xray Chest 2 Views PA/Lat (10.06.22 @ 01:33) >  Impression:    No acute cardiopulmonary abnormality.    1.1 cm nodular opacity at the left upper lung, stable since May 2018.    < end of copied text >    ECG: < from: 12 Lead ECG (10.06.22 @ 01:34) >  Ventricular Rate 91 BPM    QRS Duration 76 ms    Q-T Interval 376 ms    QTC Calculation(Bazett) 462 ms    R Axis 14 degrees    T Axis -31 degrees    Diagnosis Line Atrial fibrillation  Low voltage QRS  Septal infarct , age undetermined  Abnormal ECG    Confirmed by Behuria, Supreeti ([id number]) on 10/6/2022 8:09:44 AM    < end of copied text >      TELEMETRY EVENTS:  Afib

## 2022-10-06 NOTE — H&P ADULT - NSHPPHYSICALEXAM_GEN_ALL_CORE
LOS:     VITALS:   T(C): 36.7 (10-06-22 @ 06:38), Max: 36.7 (10-06-22 @ 06:11)  HR: 88 (10-06-22 @ 06:38) (88 - 96)  BP: 130/68 (10-06-22 @ 06:38) (108/59 - 130/68)  RR: 20 (10-06-22 @ 06:38) (17 - 20)  SpO2: 99% (10-06-22 @ 06:38) (96% - 99%)    GENERAL: NAD, lying in bed comfortably  HEAD:  Atraumatic, Normocephalic  EYES: EOMI, PERRLA, conjunctiva and sclera clear  ENT: Moist mucous membranes  NECK: Supple, No JVD  CHEST/LUNG: Clear to auscultation bilaterally; No rales, rhonchi, wheezing, or rubs. Unlabored respirations  HEART: Irregularly irregular rhythm  ABDOMEN: BSx4; Soft, nontender, nondistended  EXTREMITIES:  2+ Peripheral Pulses, brisk capillary refill. No clubbing, cyanosis, or edema  NERVOUS SYSTEM:  A&Ox3, no focal deficits   SKIN: No rashes or lesions

## 2022-10-06 NOTE — CONSULT NOTE ADULT - ASSESSMENT
#Afib  #HTN  #Hypothyroidism    81F with PMH of Afib, HTN, hypothyroidism presenting with melena and anemia ISO Afib on Xarelto.    Cardiology consulted for pre-endoscopy risk stratification in the setting of GI bleed and patient taking Xarelto.    EKG: Diagnosis Line Atrial fibrillation  Low voltage QRS  Septal infarct , age undetermined  Abnormal ECG    ECHO: Patient reports having had echo on 10/3, however repeat echo is recommended    METS: <4    RCRI: 0    -Patient is at mild-moderate risk for MACE  -Repeat echo recommended  -Patient is cleared for EGD/colon from cardiac perspective    *********INCOMPLETE STUDENT NOTE************ 81F with PMH of Afib (on Xarelto), HTN, hypothyroidism admitted for melena with acute blood loss anemia.  Cardiology consulted for pre-endoscopy risk stratification in the setting of GI bleed and patient taking Xarelto.    #Upper GI bleed iso Xarelto  #Chronic Afib  #HTN  #Hypothyroidism    Patient is hemodynamically stable.    Denies CP, SOB, light-headedness, palpitations.   Troponin < 0.01  EKG: Afib, no acute ST changes  ECHO: Patient reports having had echo on 10/3  METS: <4  RCRI: 0    -Patient is at mild-moderate risk for MACE  -Repeat echo recommended  -Patient is cleared for EGD/colon from cardiac perspective    *********INCOMPLETE STUDENT NOTE************ 81F with PMH of Afib (on Xarelto), HTN, hypothyroidism admitted for melena with acute blood loss anemia.  Cardiology consulted for pre-endoscopy risk stratification in the setting of GI bleed and patient taking Xarelto.    #Upper GI bleed iso Xarelto  #Chronic Afib  #HTN  #Hypothyroidism  hemodynamically stable.  Denies CP, SOB, light-headedness, palpitations.   Troponin <0.01  EKG: Afib, no acute ST changes  ECHO: Patient reports having had echo on 10/3 at Dr. Fuchs's office  --> pls get records from outpt clinic  METS: >4  RCRI: 0  Patient is at mild-moderate risk for MACE

## 2022-10-06 NOTE — CONSULT NOTE ADULT - ASSESSMENT
81 year old woman with past medical history of A fib (on xeralto), HTN, and hypothyroidism presents for anemia. For the past 2 months the patient was feeling short of breath and fatigued. pt started on xarelto 2 months ago, reports recurrent dark stool. was called by her cardiologist to stop Xarelto as hemoglobin dropped to 7.    # Anemia with a concern of GI loss R/O PUD, AVMs, malignancy, or other bleeding lesions     - Reports dark stool at home, [place] brown stool today   - Hgb 6.6, MCV 75.6, Cr 0.8, BUN 23, Lipase 169 (baseline 12)  - no evidence of overt bleeding   - no FH of GI cancers  - uses Aleeve PRN  - 1.25  - last dose xarelto two days ago, GFR 74   - receiving one PRBC    RECOMMENDATIONS  - monitor CBC  - active type and screen   - 2 large IVs   - transfuse to keep hemoglobin > 8   - Cardiology risk stratification for EGD/colonoscopy tomorrow   - PPI IV BID   - EGD/colonoscopy tomorrow   - Please correct electrolytes (Target Na = 135-145 | Mg = 1.7-2.2 | K = 3.5-5)  - D/C AM Labs unless clinically required  - NPO after midnight    Prep Instructions:     - Today: Please ensure patient is on CLEAR LIQUID DIET and ORDER bowel prep - 1 Gallon of Golytely, dulcolax 20mg at night   - Night Before Procedure - Please keep patient NPO after midnight  - Day of Procedure - Please call [phone] at 7am to discuss quality of bowel movements and possible additional prep instructions. (Goal for bowel movement: clear watery stools with minimal blood or brown stools)      81 year old woman with past medical history of A fib (on xeralto), HTN, and hypothyroidism presents for anemia. For the past 2 months the patient was feeling short of breath and fatigued. pt started on xarelto 2 months ago, reports recurrent dark stool. was called by her cardiologist to stop Xarelto as hemoglobin dropped to 7.    # Acute Anemia  with a concern of GI bleed R/O PUD, AVMs, malignancy, or other bleeding lesions   # History of black stool, rule out melena     - Reports dark stool at home, [place]A brown stool today   - Hgb 6.6, MCV 75.6, Cr 0.8, BUN 23, Lipase 169 (baseline 12)  - no evidence of overt bleeding now   - no FH of GI cancers  - uses Aleeve PRN  - last dose Xarelto two days ago, GFR 74   - receiving one PRBC    RECOMMENDATIONS  - monitor CBC  - active type and screen   - 2 large IVs   - transfuse to keep hemoglobin > 8   - Cardiology risk stratification for EGD/colonoscopy tomorrow   - PPI IV BID   - EGD/colonoscopy tomorrow   - Please correct electrolytes (Target Na = 135-145 | Mg = 1.7-2.2 | K = 3.5-5)  - D/C AM Labs unless clinically required  - NPO after midnight    Prep Instructions:     - Today: Please ensure patient is on CLEAR LIQUID DIET and ORDER bowel prep - 1 Gallon of Golytely, dulcolax 20mg at night   - Night Before Procedure - Please keep patient NPO after midnight  - Day of Procedure - Please call [phone] at 7am to discuss quality of bowel movements and possible additional prep instructions. (Goal for bowel movement: clear watery stools with minimal blood or brown stools)

## 2022-10-06 NOTE — ED PROVIDER NOTE - PHYSICAL EXAMINATION
GENERAL: Well-developed; well-nourished; in no acute distress.   SKIN: warm, dry  HEAD: Normocephalic; atraumatic.  EYES: PERRLA, EOMI, no conjunctival erythema  ENT: No nasal discharge; airway clear.  NECK: Supple; non tender.  CARD: S1, S2 normal; no murmurs, gallops, or rubs. Regular rate, irregular rhythm    RESP: LCTAB; No wheezes, rales, rhonchi, or stridor.  ABD: soft, nontender, and nondistended  EXT: Normal ROM.  No LE TTP or edema bilaterally.  NEURO: Alert, oriented, CN II-XII intact, normal strength, normal sensation, normal motor, finger-to-nose and heel-to-shin negative, no pronator drift, no facial droop  PSYCH: Cooperative, appropriate. GENERAL: Well-developed; well-nourished; in no acute distress.   SKIN: warm, dry  HEAD: Normocephalic; atraumatic.  EYES: PERRLA, EOMI, no conjunctival erythema  ENT: No nasal discharge; airway clear.  NECK: Supple; non tender.  CARD: S1, S2 normal; no murmurs, gallops, or rubs. Regular rate, irregular rhythm    RESP: LCTAB; No wheezes, rales, rhonchi, or stridor.  ABD: soft, nontender, and nondistended. AUGUSTA: dark brown stool   EXT: Normal ROM.  No LE TTP or edema bilaterally.  NEURO: Alert, oriented, CN II-XII intact, normal strength, normal sensation, normal motor, finger-to-nose and heel-to-shin negative, no pronator drift, no facial droop  PSYCH: Cooperative, appropriate. GENERAL: Well-developed; well-nourished; in no acute distress.   SKIN: warm, dry  HEAD: Normocephalic; atraumatic.  EYES: PERRLA, EOMI, conjunctival pallor  ENT: No nasal discharge; airway clear.  NECK: Supple; non tender.  CARD: S1, S2 normal; no murmurs, gallops, or rubs. Regular rate, irregular rhythm    RESP: LCTAB; No wheezes, rales, rhonchi, or stridor.  ABD: soft, nontender, and nondistended. AUGUSTA: dark brown stool   EXT: Normal ROM.  No LE TTP or edema bilaterally.  NEURO: Alert, oriented, CN II-XII intact, normal strength, normal sensation, normal motor, finger-to-nose and heel-to-shin negative, no pronator drift, no facial droop  PSYCH: Cooperative, appropriate. GENERAL: Well-developed; well-nourished; in no acute distress.   SKIN: warm, dry  HEAD: Normocephalic; atraumatic.  EYES: PERRLA, EOMI, conjunctival pallor  ENT: No nasal discharge; airway clear.  NECK: Supple; non tender.  CARD: S1, S2 normal; no murmurs, gallops, or rubs. Regular rate, irregular rhythm    RESP: LCTAB; No wheezes, rales, rhonchi, or stridor.  ABD: soft, nontender, and nondistended. AUGUSTA: dark brown stool   EXT: Normal ROM.  No LE TTP or edema bilaterally.  NEURO: Alert, oriented, CN II-XII intact, 5/5 strength bilateral UE and LE, normal sensation, normal motor, finger-to-nose and heel-to-shin negative, no pronator drift, no facial droop  PSYCH: Cooperative, appropriate

## 2022-10-06 NOTE — CONSULT NOTE ADULT - ATTENDING COMMENTS
I edited the note
Agree with assessment and plan, as documented above. In brief, Ms Mcgee is a 81yoF with PMHx of pAF on xarelto, HTN, and hypothyroidism who presented with fatigue, dyspnea, and melena. She was found to have microcytic anemia with hb 6.6. Cardiology consulted for pre-op evaluation prior to EGD/colonoscopy. Based on the RCRI, she is a class I low risk of 30 day death, MI or cardiac arrest from her upcoming low risk procedure. Her functional capacity was limited due to dyspnea from acute blood loss anemia. She currently reports improved breathing now that she has had pRBC transfusions. Furthermore, she reports recently normal TTE and pharm stress test. Thus, she does not require any additional cardiac testing prior to her procedures tomorrow.

## 2022-10-06 NOTE — CONSULT NOTE ADULT - SUBJECTIVE AND OBJECTIVE BOX
Gastroenterology Consultation:    Patient is a 81y old  Female who presents with a chief complaint of Anemia (06 Oct 2022 08:10)    Admitted on: 10-06-22    HPI:  81 year old woman with past medical history of A fib (on xeralto), HTN, and hypothyroidism presents for anemia. For the past 2 months the patient was feeling short of breath and fatigued. Normally she can walk long distances but recently she becomes short of breathe and diaphoretic after walking even 2 steps. She also reports dark stools. On Monday, her cardiologist called her to tell her that her Hgb was 7 and to stop the Xeralto and prescribed Ferrous Sulfate for her. She denies fever, chest pain, nausea, vomiting, diarrhea, or constipation.   In the ED was hemodynamically stable Hgb 6.6, MCV 75.6, Cr 0.8, BUN 23, Lipase 169. Patient receiving 1 unit PRBC during time of interview and was given 80 mg Protonix IV and started on Protonix drip. Digital rectal exam in ED showing brown stool.     Prior EGD/ Colonoscopy: never had EGD, colonoscopy > 10 years ago       PAST MEDICAL & SURGICAL HISTORY:  Hypercholesteremia      Hypothyroid      Atrial fibrillation      HTN (hypertension)      No significant past surgical history            FAMILY HISTORY:  no FH of GI cancers     Social History:  Tobacco: denies   Alcohol: denies   Drugs: denies     Home Medications:  amLODIPine 5 mg oral tablet: 1 tab(s) orally once a day (06 Oct 2022 00:53)  metoprolol succinate 50 mg oral tablet, extended release: 1 tab(s) orally once a day (06 Oct 2022 00:53)        MEDICATIONS  (STANDING):  amLODIPine   Tablet 5 milliGRAM(s) Oral daily  atorvastatin 40 milliGRAM(s) Oral at bedtime  bisacodyl 20 milliGRAM(s) Oral at bedtime  chlorhexidine 2% Cloths 1 Application(s) Topical <User Schedule>  ferrous    sulfate 325 milliGRAM(s) Oral two times a day  levothyroxine 125 MICROGram(s) Oral daily  metoprolol succinate ER 50 milliGRAM(s) Oral daily  pantoprazole  Injectable 40 milliGRAM(s) IV Push two times a day  polyethylene glycol/electrolyte Solution. 4000 milliLiter(s) Oral once    MEDICATIONS  (PRN):      Allergies  penicillins (Rash)      Review of Systems:   Constitutional:  No Fever, No Chills  ENT/Mouth:  No Hearing Changes,  No Difficulty Swallowing  Eyes:  No Eye Pain, No Vision Changes  Cardiovascular:  No Chest Pain, No Palpitations  Respiratory:  No Cough, No Dyspnea  Gastrointestinal:  As described in HPI  Musculoskeletal:  No Joint Swelling, No Back Pain  Skin:  No Skin Lesions, No Jaundice  Neuro:  No Syncope, No Dizziness  Heme/Lymph:  No Bruising, No Bleeding.          Physical Examination:  T(C): 36.3 (10-06-22 @ 09:17), Max: 36.7 (10-06-22 @ 06:11)  HR: 84 (10-06-22 @ 09:17) (84 - 96)  BP: 140/58 (10-06-22 @ 09:17) (108/59 - 140/58)  RR: 18 (10-06-22 @ 09:17) (17 - 20)  SpO2: 99% (10-06-22 @ 09:17) (96% - 99%)  Height (cm): 160 (10-05-22 @ 22:43)  Weight (kg): 93.9 (10-05-22 @ 22:43)        GENERAL: AAOx3, no acute distress.  HEAD:  Atraumatic, Normocephalic  EYES: conjunctiva and sclera clear  NECK: Supple, no JVD or thyromegaly  CHEST/LUNG: Clear to auscultation bilaterally; No wheeze, rhonchi, or rales  HEART: Regular rate and rhythm; normal S1, S2, No murmurs.  ABDOMEN: Soft, nontender, nondistended; Bowel sounds present  NEUROLOGY: No asterixis or tremor.   SKIN: Intact, no jaundice        Data:                        6.6    9.76  )-----------( 404      ( 06 Oct 2022 02:05 )             23.6     Hgb Trend:  6.6  10-06-22 @ 02:05      10-06    138  |  103  |  23<H>  ----------------------------<  91  4.2   |  25  |  0.8    Ca    8.5      06 Oct 2022 02:05    TPro  6.4  /  Alb  4.0  /  TBili  0.3  /  DBili  x   /  AST  16  /  ALT  11  /  AlkPhos  90  10-06    Liver panel trend:  TBili 0.3   /   AST 16   /   ALT 11   /   AlkP 90   /   Tptn 6.4   /   Alb 4.0    /   DBili --      10-06      PT/INR - ( 06 Oct 2022 02:05 )   PT: 14.40 sec;   INR: 1.25 ratio         PTT - ( 06 Oct 2022 02:05 )  PTT:52.8 sec        Radiology:

## 2022-10-06 NOTE — ED ADULT NURSE REASSESSMENT NOTE - NS ED NURSE REASSESS COMMENT FT1
Blood transfusion initiate by night RN @ 0623.  Pt tolerating well.  Denies SOB, pruritis, dyspnea.  Pt stable at this time, will continue to monitor.

## 2022-10-06 NOTE — CONSULT NOTE ADULT - TIME BILLING
Interviewed and examined patient. Reviewed hospital course, ECG, TTE, CXR, tele, lab work, and medications. Discussed plan with patient.
coordination of care

## 2022-10-06 NOTE — ED ADULT NURSE REASSESSMENT NOTE - NS ED NURSE REASSESS COMMENT FT1
pt noted to be in a fib, pt in no distress at this time, vss. pt's afib is rate controlled, pt placed on monitor, denies chest pain or sob. ekg unchanged. will continue to monitor.

## 2022-10-06 NOTE — ED PROVIDER NOTE - ATTENDING CONTRIBUTION TO CARE
81 yo f hx of afib, just taken off xarelto, co dark stools and low Hb from outpt lab. pw weakness, fatigue, moyer.  better (relief) with rest. no fever, bloody stools.    pt in nad, anict pale conj neck sup, ctab, rrr, ab sof,t nt, nd. nfd.    labs, imaging, ekg, supportive care  reassess

## 2022-10-07 ENCOUNTER — RESULT REVIEW (OUTPATIENT)
Age: 82
End: 2022-10-07

## 2022-10-07 ENCOUNTER — TRANSCRIPTION ENCOUNTER (OUTPATIENT)
Age: 82
End: 2022-10-07

## 2022-10-07 PROBLEM — I48.91 UNSPECIFIED ATRIAL FIBRILLATION: Chronic | Status: ACTIVE | Noted: 2022-10-06

## 2022-10-07 PROBLEM — I10 ESSENTIAL (PRIMARY) HYPERTENSION: Chronic | Status: ACTIVE | Noted: 2022-10-06

## 2022-10-07 LAB
24R-OH-CALCIDIOL SERPL-MCNC: 72 NG/ML — SIGNIFICANT CHANGE UP (ref 30–80)
ALBUMIN SERPL ELPH-MCNC: 3.5 G/DL — SIGNIFICANT CHANGE UP (ref 3.5–5.2)
ALP SERPL-CCNC: 88 U/L — SIGNIFICANT CHANGE UP (ref 30–115)
ALT FLD-CCNC: 11 U/L — SIGNIFICANT CHANGE UP (ref 0–41)
ANION GAP SERPL CALC-SCNC: 14 MMOL/L — SIGNIFICANT CHANGE UP (ref 7–14)
AST SERPL-CCNC: 17 U/L — SIGNIFICANT CHANGE UP (ref 0–41)
BASOPHILS # BLD AUTO: 0.04 K/UL — SIGNIFICANT CHANGE UP (ref 0–0.2)
BASOPHILS NFR BLD AUTO: 0.4 % — SIGNIFICANT CHANGE UP (ref 0–1)
BILIRUB SERPL-MCNC: 0.8 MG/DL — SIGNIFICANT CHANGE UP (ref 0.2–1.2)
BUN SERPL-MCNC: 18 MG/DL — SIGNIFICANT CHANGE UP (ref 10–20)
CALCIUM SERPL-MCNC: 8.9 MG/DL — SIGNIFICANT CHANGE UP (ref 8.4–10.5)
CHLORIDE SERPL-SCNC: 104 MMOL/L — SIGNIFICANT CHANGE UP (ref 98–110)
CO2 SERPL-SCNC: 21 MMOL/L — SIGNIFICANT CHANGE UP (ref 17–32)
CREAT SERPL-MCNC: 0.7 MG/DL — SIGNIFICANT CHANGE UP (ref 0.7–1.5)
EGFR: 86 ML/MIN/1.73M2 — SIGNIFICANT CHANGE UP
EOSINOPHIL # BLD AUTO: 0.08 K/UL — SIGNIFICANT CHANGE UP (ref 0–0.7)
EOSINOPHIL NFR BLD AUTO: 0.9 % — SIGNIFICANT CHANGE UP (ref 0–8)
FOLATE SERPL-MCNC: >20 NG/ML — SIGNIFICANT CHANGE UP
GLUCOSE SERPL-MCNC: 75 MG/DL — SIGNIFICANT CHANGE UP (ref 70–99)
HCT VFR BLD CALC: 28.9 % — LOW (ref 37–47)
HCT VFR BLD CALC: 29.4 % — LOW (ref 37–47)
HGB BLD-MCNC: 8.6 G/DL — LOW (ref 12–16)
HGB BLD-MCNC: 8.6 G/DL — LOW (ref 12–16)
IMM GRANULOCYTES NFR BLD AUTO: 0.4 % — HIGH (ref 0.1–0.3)
LYMPHOCYTES # BLD AUTO: 1.89 K/UL — SIGNIFICANT CHANGE UP (ref 1.2–3.4)
LYMPHOCYTES # BLD AUTO: 20.5 % — SIGNIFICANT CHANGE UP (ref 20.5–51.1)
MAGNESIUM SERPL-MCNC: 2.2 MG/DL — SIGNIFICANT CHANGE UP (ref 1.8–2.4)
MCHC RBC-ENTMCNC: 23.1 PG — LOW (ref 27–31)
MCHC RBC-ENTMCNC: 23.2 PG — LOW (ref 27–31)
MCHC RBC-ENTMCNC: 29.3 G/DL — LOW (ref 32–37)
MCHC RBC-ENTMCNC: 29.8 G/DL — LOW (ref 32–37)
MCV RBC AUTO: 78.1 FL — LOW (ref 81–99)
MCV RBC AUTO: 78.8 FL — LOW (ref 81–99)
MONOCYTES # BLD AUTO: 0.76 K/UL — HIGH (ref 0.1–0.6)
MONOCYTES NFR BLD AUTO: 8.2 % — SIGNIFICANT CHANGE UP (ref 1.7–9.3)
NEUTROPHILS # BLD AUTO: 6.42 K/UL — SIGNIFICANT CHANGE UP (ref 1.4–6.5)
NEUTROPHILS NFR BLD AUTO: 69.6 % — SIGNIFICANT CHANGE UP (ref 42.2–75.2)
NRBC # BLD: 0 /100 WBCS — SIGNIFICANT CHANGE UP (ref 0–0)
NRBC # BLD: 0 /100 WBCS — SIGNIFICANT CHANGE UP (ref 0–0)
PLATELET # BLD AUTO: 311 K/UL — SIGNIFICANT CHANGE UP (ref 130–400)
PLATELET # BLD AUTO: 395 K/UL — SIGNIFICANT CHANGE UP (ref 130–400)
POTASSIUM SERPL-MCNC: 4.7 MMOL/L — SIGNIFICANT CHANGE UP (ref 3.5–5)
POTASSIUM SERPL-SCNC: 4.7 MMOL/L — SIGNIFICANT CHANGE UP (ref 3.5–5)
PROT SERPL-MCNC: 6.1 G/DL — SIGNIFICANT CHANGE UP (ref 6–8)
RBC # BLD: 3.7 M/UL — LOW (ref 4.2–5.4)
RBC # BLD: 3.73 M/UL — LOW (ref 4.2–5.4)
RBC # FLD: 19.2 % — HIGH (ref 11.5–14.5)
RBC # FLD: 19.4 % — HIGH (ref 11.5–14.5)
SODIUM SERPL-SCNC: 139 MMOL/L — SIGNIFICANT CHANGE UP (ref 135–146)
VIT B12 SERPL-MCNC: >2000 PG/ML — HIGH (ref 232–1245)
WBC # BLD: 10 K/UL — SIGNIFICANT CHANGE UP (ref 4.8–10.8)
WBC # BLD: 9.23 K/UL — SIGNIFICANT CHANGE UP (ref 4.8–10.8)
WBC # FLD AUTO: 10 K/UL — SIGNIFICANT CHANGE UP (ref 4.8–10.8)
WBC # FLD AUTO: 9.23 K/UL — SIGNIFICANT CHANGE UP (ref 4.8–10.8)

## 2022-10-07 PROCEDURE — 88312 SPECIAL STAINS GROUP 1: CPT | Mod: 26

## 2022-10-07 PROCEDURE — 99233 SBSQ HOSP IP/OBS HIGH 50: CPT

## 2022-10-07 PROCEDURE — 88305 TISSUE EXAM BY PATHOLOGIST: CPT | Mod: 26

## 2022-10-07 PROCEDURE — 43239 EGD BIOPSY SINGLE/MULTIPLE: CPT | Mod: XS

## 2022-10-07 PROCEDURE — 45378 DIAGNOSTIC COLONOSCOPY: CPT

## 2022-10-07 RX ORDER — PANTOPRAZOLE SODIUM 20 MG/1
40 TABLET, DELAYED RELEASE ORAL
Refills: 0 | Status: DISCONTINUED | OUTPATIENT
Start: 2022-10-07 | End: 2022-10-08

## 2022-10-07 RX ORDER — PANTOPRAZOLE SODIUM 20 MG/1
40 TABLET, DELAYED RELEASE ORAL
Refills: 0 | Status: DISCONTINUED | OUTPATIENT
Start: 2022-10-07 | End: 2022-10-07

## 2022-10-07 RX ADMIN — Medication 20 MILLIGRAM(S): at 21:14

## 2022-10-07 RX ADMIN — AMLODIPINE BESYLATE 5 MILLIGRAM(S): 2.5 TABLET ORAL at 05:41

## 2022-10-07 RX ADMIN — Medication 325 MILLIGRAM(S): at 05:41

## 2022-10-07 RX ADMIN — PANTOPRAZOLE SODIUM 40 MILLIGRAM(S): 20 TABLET, DELAYED RELEASE ORAL at 05:42

## 2022-10-07 RX ADMIN — Medication 325 MILLIGRAM(S): at 20:48

## 2022-10-07 RX ADMIN — Medication 50 MILLIGRAM(S): at 05:40

## 2022-10-07 RX ADMIN — ATORVASTATIN CALCIUM 40 MILLIGRAM(S): 80 TABLET, FILM COATED ORAL at 21:14

## 2022-10-07 RX ADMIN — Medication 125 MICROGRAM(S): at 05:40

## 2022-10-07 NOTE — PATIENT PROFILE ADULT - FALL HARM RISK - UNIVERSAL INTERVENTIONS
Bed in lowest position, wheels locked, appropriate side rails in place/Call bell, personal items and telephone in reach/Instruct patient to call for assistance before getting out of bed or chair/Non-slip footwear when patient is out of bed/[place] to call system/Physically safe environment - no spills, clutter or unnecessary equipment/Purposeful Proactive Rounding/Room/bathroom lighting operational, light cord in reach

## 2022-10-07 NOTE — PROGRESS NOTE ADULT - SUBJECTIVE AND OBJECTIVE BOX
pt seen and examined.   feels better. no active bleeding this morning         ROS: no cp, no sob, no n/v, no fever    Vital Signs Last 24 Hrs  T(C): 36.3 (07 Oct 2022 10:14), Max: 36.3 (07 Oct 2022 08:43)  T(F): 97.4 (07 Oct 2022 09:30), Max: 97.4 (07 Oct 2022 08:43)  HR: 87 (07 Oct 2022 10:14) (74 - 107)  BP: 152/98 (07 Oct 2022 10:14) (109/51 - 152/98)  RR: 18 (07 Oct 2022 10:14) (16 - 18)  SpO2: 99% (07 Oct 2022 10:14) (99% - 100%)    Parameters below as of 07 Oct 2022 00:39  Patient On (Oxygen Delivery Method): room air    physical exam  constitutional NAD, AAOX3, Respiratory  lungs CTA, CVS heart RRR, GI: abdomen Soft NT, ND, BS+, skin: intact  neuro exam Motor, sensory and CN normal, no deficit     MEDICATIONS  (STANDING):  amLODIPine   Tablet 5 milliGRAM(s) Oral daily  atorvastatin 40 milliGRAM(s) Oral at bedtime  bisacodyl 20 milliGRAM(s) Oral at bedtime  chlorhexidine 2% Cloths 1 Application(s) Topical <User Schedule>  ferrous    sulfate 325 milliGRAM(s) Oral two times a day  levothyroxine 125 MICROGram(s) Oral daily  metoprolol succinate ER 50 milliGRAM(s) Oral daily  pantoprazole  Injectable 40 milliGRAM(s) IV Push two times a day                       7.5    8.43  )-----------( 322      ( 06 Oct 2022 23:24 )             25.1     10-06    139  |  104  |  18  ----------------------------<  75  4.7   |  21  |  0.7    Ca    8.9      06 Oct 2022 23:24  Mg     2.2     10-06    TPro  6.1  /  Alb  3.5  /  TBili  0.8  /  DBili  x   /  AST  17  /  ALT  11  /  AlkPhos  88  10-06    D-Dimer Assay, Quantitative: 405 ng/mL DDU [0 - 230] (10-06-22 @ 11:26)    COVID-19 PCR: NotDetec (10-06-22 @ 01:55)      CARDIAC MARKERS ( 06 Oct 2022 02:05 )  x     / <0.01 ng/mL / x     / x     / x          PT/INR - ( 06 Oct 2022 02:05 )   PT: 14.40 sec;   INR: 1.25 ratio         PTT - ( 06 Oct 2022 02:05 )  PTT:52.8 sec    a/p    # GIB, anemia due to gi loss, sp transfusion, colonoscopy today     # Hypercholesteremia cont statin   # Hypothyroid cont levothyroixe    # Atrial fibrillation, rate controlled, ac on hold due to active gib , resume when ok with GI     # HTN controlled, cont meds     #Progress Note Handoff  Pending (specify):  Colonoscopy today   Family discussion: casper pt and his son and his daughter at the bedside.   Disposition: Home_ when stable and ok with GI                   pt seen and examined.   feels better. no active bleeding this morning     ROS: no cp, no sob, no n/v, no fever    Vital Signs Last 24 Hrs  T(C): 36.3 (07 Oct 2022 10:14), Max: 36.3 (07 Oct 2022 08:43)  T(F): 97.4 (07 Oct 2022 09:30), Max: 97.4 (07 Oct 2022 08:43)  HR: 87 (07 Oct 2022 10:14) (74 - 107)  BP: 152/98 (07 Oct 2022 10:14) (109/51 - 152/98)  RR: 18 (07 Oct 2022 10:14) (16 - 18)  SpO2: 99% (07 Oct 2022 10:14) (99% - 100%)    Parameters below as of 07 Oct 2022 00:39  Patient On (Oxygen Delivery Method): room air    physical exam  constitutional NAD, AAOX3, Respiratory  lungs CTA, CVS heart RRR, GI: abdomen Soft NT, ND, BS+, skin: intact  neuro exam Motor, sensory and CN normal, no deficit     MEDICATIONS  (STANDING):  amLODIPine   Tablet 5 milliGRAM(s) Oral daily  atorvastatin 40 milliGRAM(s) Oral at bedtime  bisacodyl 20 milliGRAM(s) Oral at bedtime  chlorhexidine 2% Cloths 1 Application(s) Topical <User Schedule>  ferrous    sulfate 325 milliGRAM(s) Oral two times a day  levothyroxine 125 MICROGram(s) Oral daily  metoprolol succinate ER 50 milliGRAM(s) Oral daily  pantoprazole  Injectable 40 milliGRAM(s) IV Push two times a day                       7.5    8.43  )-----------( 322      ( 06 Oct 2022 23:24 )             25.1     10-06    139  |  104  |  18  ----------------------------<  75  4.7   |  21  |  0.7    Ca    8.9      06 Oct 2022 23:24  Mg     2.2     10-06    TPro  6.1  /  Alb  3.5  /  TBili  0.8  /  DBili  x   /  AST  17  /  ALT  11  /  AlkPhos  88  10-06    D-Dimer Assay, Quantitative: 405 ng/mL DDU [0 - 230] (10-06-22 @ 11:26)    COVID-19 PCR: NotDetec (10-06-22 @ 01:55)      CARDIAC MARKERS ( 06 Oct 2022 02:05 )  x     / <0.01 ng/mL / x     / x     / x          PT/INR - ( 06 Oct 2022 02:05 )   PT: 14.40 sec;   INR: 1.25 ratio         PTT - ( 06 Oct 2022 02:05 )  PTT:52.8 sec    a/p    # GIB, anemia due to gi loss, sp transfusion, colonoscopy today     # Hypercholesteremia cont statin   # Hypothyroid cont levothyroixe    # Atrial fibrillation, rate controlled, ac on hold due to active gib , resume when ok with GI     # HTN controlled, cont meds     #Progress Note Handoff  Pending (specify):  Colonoscopy today   Family discussion: dw pt and her daughter at the bedside.   Disposition: Home_ when stable and ok with GI

## 2022-10-07 NOTE — CHART NOTE - NSCHARTNOTEFT_GEN_A_CORE
EGD Findings:  Grade B esophagitis with no bleeding was seen in the esophagus, compatible with nonspecific erosive esophagitis.  Diffuse continuous erythema of the mucosa with no bleeding was noted in the stomach. These findings are compatible with non-erosive gastritis. Biopsies were obtained to evaluate for H.Pylori infection.  Normal mucosa was noted in the whole examined duodenum. Random mucosal biopsies were obtained to evaluate for histologic features of celiac disease.    Colonoscopy Findings:  Multiple non-bleeding diverticula with medium openings were seen in the whole colon. Diverticulosis appeared to be of moderate severity.	  Medium non-bleeding internal and external hemorrhoids were noted.	  The colon was otherwise unremarkable. No source of bleeding was noted throughout the exam..      Plan:    Await pathology    Protonix 40 mg po bid    Follow-up office visit in 2-3 weeks     Clear Liquid Diet  for VCE tomorrow   Repeat EGD in 8 weeks to confirm healing of esophagitis    Resume Anticoagulation if indicated EGD Findings:  Grade B esophagitis with no bleeding was seen in the esophagus, compatible with nonspecific erosive esophagitis.  Diffuse continuous erythema of the mucosa with no bleeding was noted in the stomach. These findings are compatible with non-erosive gastritis. Biopsies were obtained to evaluate for H.Pylori infection.  Normal mucosa was noted in the whole examined duodenum. Random mucosal biopsies were obtained to evaluate for histologic features of celiac disease.    Colonoscopy Findings:  Multiple non-bleeding diverticula with medium openings were seen in the whole colon. Diverticulosis appeared to be of moderate severity.	  Medium non-bleeding internal and external hemorrhoids were noted.	  The colon was otherwise unremarkable. No source of bleeding was noted throughout the exam..      Plan:    Await pathology    Protonix 40 mg po bid    Follow-up office visit in 2-3 weeks     Advance diet   Repeat EGD in 8 weeks to confirm healing of esophagitis    Resume Anticoagulation if indicated  follow up as outpatient on Wednesday 10/12 @ 1 PM for VCE with Dr Gary  ([address])

## 2022-10-07 NOTE — PROGRESS NOTE ADULT - ASSESSMENT
81 year old woman with past medical history of A fib (on xeralto), HTN, and hypothyroidism presents for anemia.    # Anemia 2//2 possible GI bleed   - Hb 6.6 on admission, received 2 pRBCs   - Hb 7.5 this am (before transfusion)  - keep Hb > 8, keep active type and screen  - endoscopy showed non-erosive gastritis  - colonoscopy showed non-bleeding diverticulisit  - started on a clear diet prepare for video capsule endoscopy tomorrow    - keep active type and screen and Hb>8  - start on Protonix 40 mg BID   - FU CBCD @8pm and preop labs for tomorrow       # Paroxysmal Afib, rate controlled   - xarelto on hold due to GI bleed  - c/w toprol xl     # HTN, stable  - c/w amlodipine, toprol xl daily    # Hypothyroidism   - c/w levothyroxine 125 mcg daily    # DVT prophylaxis - on SCD  # GI prophylaxis - on protonix  # Code status - Full Code .

## 2022-10-07 NOTE — PROGRESS NOTE ADULT - SUBJECTIVE AND OBJECTIVE BOX
SUBJECTIVE / OVERNIGHT EVENTS  Patient slept well overnight. No acute complaints this AM. Patient does not report fevers, chills, CP, SOB, or n/v/d. patient reported shortness of breath on exertion     MEDICATIONS  amLODIPine   Tablet 5 milliGRAM(s) Oral daily  atorvastatin 40 milliGRAM(s) Oral at bedtime  bisacodyl 20 milliGRAM(s) Oral at bedtime  chlorhexidine 2% Cloths 1 Application(s) Topical <User Schedule>  ferrous    sulfate 325 milliGRAM(s) Oral two times a day  levothyroxine 125 MICROGram(s) Oral daily  metoprolol succinate ER 50 milliGRAM(s) Oral daily  pantoprazole  Injectable 40 milliGRAM(s) IV Push two times a day      VITALS /  EXAM    T(C): 36.4 (10-07-22 @ 12:03), Max: 36.4 (10-07-22 @ 12:03)  HR: 94 (10-07-22 @ 12:18) (74 - 107)  BP: 124/58 (10-07-22 @ 12:18) (109/51 - 152/98)  RR: 18 (10-07-22 @ 12:18) (16 - 18)  SpO2: 96% (10-07-22 @ 12:18) (93% - 100%)    GENERAL: NAD, well-developed  CHEST/LUNG: Clear to auscultation bilaterally; No wheezes, rales or rhonchi  HEART: Regular rate and rhythm; No murmurs, rubs, or gallops  ABDOMEN: Soft, Nontender, Nondistended; Bowel sounds present, no masses.  EXTREMITIES:  2+ Peripheral Pulses, No clubbing, cyanosis, or edema    I's & O's     LABS             7.5    8.43  )-----------( 322      ( 10-06-22 @ 23:24 )             25.1     139  |  104  |  18  -------------------------<  75   10-06-22 @ 23:24  4.7  |  21  |  0.7    Ca      8.9     10-06-22 @ 23:24  Mg     2.2     10-06-22 @ 23:24    TPro  6.1  /  Alb  3.5  /  TBili  0.8  /  DBili  x   /  AST  17  /  ALT  11  /  AlkPhos  88  /  GGT  x     10-06-22 @ 23:24    PT/INR - ( 10-06-22 @ 02:05 )   PT: 14.40 sec<H>;   INR: 1.25 ratio  PTT - ( 10-06-22 @ 02:05 )  PTT:52.8 sec    Troponin T, Serum: <0.01 ng/mL (10-06-22 @ 02:05)                  MICRO / IMAGING / CARDIOLOGY    CULTURES    IMAGING  PACS Image:  (10-06-22 @ 01:33)

## 2022-10-08 ENCOUNTER — TRANSCRIPTION ENCOUNTER (OUTPATIENT)
Age: 82
End: 2022-10-08

## 2022-10-08 VITALS
DIASTOLIC BLOOD PRESSURE: 63 MMHG | HEART RATE: 82 BPM | OXYGEN SATURATION: 96 % | RESPIRATION RATE: 18 BRPM | SYSTOLIC BLOOD PRESSURE: 119 MMHG | TEMPERATURE: 97 F

## 2022-10-08 LAB
ALBUMIN SERPL ELPH-MCNC: 3.7 G/DL — SIGNIFICANT CHANGE UP (ref 3.5–5.2)
ALP SERPL-CCNC: 93 U/L — SIGNIFICANT CHANGE UP (ref 30–115)
ALT FLD-CCNC: 11 U/L — SIGNIFICANT CHANGE UP (ref 0–41)
ANION GAP SERPL CALC-SCNC: 12 MMOL/L — SIGNIFICANT CHANGE UP (ref 7–14)
AST SERPL-CCNC: 18 U/L — SIGNIFICANT CHANGE UP (ref 0–41)
BASOPHILS # BLD AUTO: 0.03 K/UL — SIGNIFICANT CHANGE UP (ref 0–0.2)
BASOPHILS NFR BLD AUTO: 0.3 % — SIGNIFICANT CHANGE UP (ref 0–1)
BILIRUB SERPL-MCNC: 0.8 MG/DL — SIGNIFICANT CHANGE UP (ref 0.2–1.2)
BUN SERPL-MCNC: 10 MG/DL — SIGNIFICANT CHANGE UP (ref 10–20)
CALCIUM SERPL-MCNC: 8.9 MG/DL — SIGNIFICANT CHANGE UP (ref 8.4–10.5)
CHLORIDE SERPL-SCNC: 107 MMOL/L — SIGNIFICANT CHANGE UP (ref 98–110)
CO2 SERPL-SCNC: 24 MMOL/L — SIGNIFICANT CHANGE UP (ref 17–32)
CREAT SERPL-MCNC: 0.8 MG/DL — SIGNIFICANT CHANGE UP (ref 0.7–1.5)
EGFR: 74 ML/MIN/1.73M2 — SIGNIFICANT CHANGE UP
EOSINOPHIL # BLD AUTO: 0.1 K/UL — SIGNIFICANT CHANGE UP (ref 0–0.7)
EOSINOPHIL NFR BLD AUTO: 1.1 % — SIGNIFICANT CHANGE UP (ref 0–8)
GLUCOSE SERPL-MCNC: 126 MG/DL — HIGH (ref 70–99)
HCT VFR BLD CALC: 30.5 % — LOW (ref 37–47)
HGB BLD-MCNC: 8.9 G/DL — LOW (ref 12–16)
IMM GRANULOCYTES NFR BLD AUTO: 0.4 % — HIGH (ref 0.1–0.3)
LYMPHOCYTES # BLD AUTO: 1.46 K/UL — SIGNIFICANT CHANGE UP (ref 1.2–3.4)
LYMPHOCYTES # BLD AUTO: 16.4 % — LOW (ref 20.5–51.1)
MAGNESIUM SERPL-MCNC: 2.3 MG/DL — SIGNIFICANT CHANGE UP (ref 1.8–2.4)
MCHC RBC-ENTMCNC: 23.3 PG — LOW (ref 27–31)
MCHC RBC-ENTMCNC: 29.2 G/DL — LOW (ref 32–37)
MCV RBC AUTO: 79.8 FL — LOW (ref 81–99)
MONOCYTES # BLD AUTO: 0.61 K/UL — HIGH (ref 0.1–0.6)
MONOCYTES NFR BLD AUTO: 6.8 % — SIGNIFICANT CHANGE UP (ref 1.7–9.3)
NEUTROPHILS # BLD AUTO: 6.67 K/UL — HIGH (ref 1.4–6.5)
NEUTROPHILS NFR BLD AUTO: 75 % — SIGNIFICANT CHANGE UP (ref 42.2–75.2)
NRBC # BLD: 0 /100 WBCS — SIGNIFICANT CHANGE UP (ref 0–0)
PHOSPHATE SERPL-MCNC: 3.7 MG/DL — SIGNIFICANT CHANGE UP (ref 2.1–4.9)
PLATELET # BLD AUTO: 382 K/UL — SIGNIFICANT CHANGE UP (ref 130–400)
POTASSIUM SERPL-MCNC: 4.5 MMOL/L — SIGNIFICANT CHANGE UP (ref 3.5–5)
POTASSIUM SERPL-SCNC: 4.5 MMOL/L — SIGNIFICANT CHANGE UP (ref 3.5–5)
PROT SERPL-MCNC: 6.3 G/DL — SIGNIFICANT CHANGE UP (ref 6–8)
RBC # BLD: 3.82 M/UL — LOW (ref 4.2–5.4)
RBC # FLD: 20.5 % — HIGH (ref 11.5–14.5)
SODIUM SERPL-SCNC: 143 MMOL/L — SIGNIFICANT CHANGE UP (ref 135–146)
WBC # BLD: 8.91 K/UL — SIGNIFICANT CHANGE UP (ref 4.8–10.8)
WBC # FLD AUTO: 8.91 K/UL — SIGNIFICANT CHANGE UP (ref 4.8–10.8)

## 2022-10-08 PROCEDURE — 99233 SBSQ HOSP IP/OBS HIGH 50: CPT

## 2022-10-08 RX ORDER — PANTOPRAZOLE SODIUM 20 MG/1
1 TABLET, DELAYED RELEASE ORAL
Qty: 28 | Refills: 0
Start: 2022-10-08 | End: 2022-10-21

## 2022-10-08 RX ORDER — INFLUENZA VIRUS VACCINE 15; 15; 15; 15 UG/.5ML; UG/.5ML; UG/.5ML; UG/.5ML
0.7 SUSPENSION INTRAMUSCULAR ONCE
Refills: 0 | Status: DISCONTINUED | OUTPATIENT
Start: 2022-10-08 | End: 2022-10-08

## 2022-10-08 RX ADMIN — AMLODIPINE BESYLATE 5 MILLIGRAM(S): 2.5 TABLET ORAL at 05:49

## 2022-10-08 RX ADMIN — Medication 50 MILLIGRAM(S): at 05:49

## 2022-10-08 RX ADMIN — Medication 125 MICROGRAM(S): at 05:49

## 2022-10-08 RX ADMIN — Medication 325 MILLIGRAM(S): at 06:03

## 2022-10-08 RX ADMIN — PANTOPRAZOLE SODIUM 40 MILLIGRAM(S): 20 TABLET, DELAYED RELEASE ORAL at 05:50

## 2022-10-08 NOTE — DISCHARGE NOTE PROVIDER - NSDCCPCAREPLAN_GEN_ALL_CORE_FT
PRINCIPAL DISCHARGE DIAGNOSIS  Diagnosis: Anemia  Assessment and Plan of Treatment: you came to the hospital for blood in the stools. you were given blood.  you were given protonix. An endoscopy and colonoscopy was done to look for the source of bleed. there was no bleeding source identified. your hemoglobin (the indication of blood level) was stable. the gastroenterology team saw you and they recommedned that you followup with them outpatient to do the video capsule to look if there is any bleeding in the small intestine

## 2022-10-08 NOTE — DISCHARGE NOTE PROVIDER - NSDCFUADDAPPT_GEN_ALL_CORE_FT
Please follow up with your primary care physician within two weeks after discharge to update them on your recent hospitalization and to review any changes in your medications.  Please followup with the gastroenterology team on 10/12/22  please followup with Dr. Realawi the gastroenterology doctor in 1 week   If you have nay bleeding come back to the emergency department  Please follow up with your primary care physician within two weeks after discharge to update them on your recent hospitalization and to review any changes in your medications.  Please followup with the gastroenterology team on 10/12/22  please followup with Dr. Maloney the gastroenterology doctor in 1 week   If you have nay bleeding come back to the emergency department   please followup with dr. Galloway heart doctor in 2 weeks

## 2022-10-08 NOTE — DISCHARGE NOTE PROVIDER - NSDCMRMEDTOKEN_GEN_ALL_CORE_FT
amLODIPine 5 mg oral tablet: 1 tab(s) orally once a day  Crestor 10 mg oral tablet: 1 tab(s) orally once a day  levothyroxine 125 mcg (0.125 mg) oral tablet: 1 tab(s) orally once a day  metoprolol succinate 50 mg oral tablet, extended release: 1 tab(s) orally once a day  pantoprazole 40 mg oral delayed release tablet: 1 tab(s) orally 2 times a day   Protonix 40 mg oral delayed release tablet: 1 tab(s) orally once a day

## 2022-10-08 NOTE — DISCHARGE NOTE NURSING/CASE MANAGEMENT/SOCIAL WORK - NSDCPEFALRISK_GEN_ALL_CORE
For information on Fall & Injury Prevention, visit: [email or web address] OR  [email or web address] OR  https://www.cdc.gov/steadi/patient.html

## 2022-10-08 NOTE — DISCHARGE NOTE PROVIDER - HOSPITAL COURSE
81 year old woman with past medical history of A fib (on xeralto), HTN, and hypothyroidism presents for anemia. For the past 2 months the patient was feeling short of breath and fatigued. Normally she can walk long distances but recently she becomes short of breathe and diaphoretic after walking even 2 steps. She also reports dark stools. On Monday, her cardiologist called her to tell her that her Hgb was 7 and to stop the Xeralto and prescribed Ferrous Sulfate for her. She denies fever, chest pain, nausea, vomiting, diarrhea, or constipation. She does endorse a cough productive of yellow sputum.  In the ED:  Vitals: /56, HR 95, RR 17, T 96.7, SpO2 98% RA  Labs: WBC 9.76, Hgb 6.6, MCV 75.6, Cr 0.8, BUN 23, Lipase 169  Patient received 2 unit PRBC in ED and was given 80 mg Protonix IV and started on Protonix drip. Digital rectal exam was positive for blood.       # Anemia 2//2 possible GI bleed   - Hb 6.6 on admission, received 2 pRBCs   - Hb 8.6 this am  stable overnight and today morning  - keep Hb > 8, keep active type and screen  - endoscopy showed non-erosive gastritis  - colonoscopy showed non-bleeding diverticulis  - started on a clear diet prepare for video capsule endoscopy  - keep active type and screen and Hb>8  - start on Protonix 40 mg BID   - patient will be discharged to followup with GI for capsule endoscopy    # Paroxysmal Afib, rate controlled   - xarelto was held due to GI bleed  - given toprol xl     # HTN, stable  - given amlodipine, toprol xl daily    # Hypothyroidism   - given levothyroxine 125 mcg daily

## 2022-10-08 NOTE — CHART NOTE - NSCHARTNOTEFT_GEN_A_CORE
patient's daughter refused discharging the patient on Xarelto, she was instructed to followup with cardiology as soon as possible after discharge and the daughter was very understanding. she understands that the patient needs to be on anticoagulation uch as xarelto for her Atrial fibrillation. she will followup with cardiology and GI as OP

## 2022-10-08 NOTE — DISCHARGE NOTE PROVIDER - ATTENDING DISCHARGE PHYSICAL EXAMINATION:
T(F): 97.7 (10-08-22 @ 04:54), Max: 98 (10-07-22 @ 20:20)  HR: 95 (10-08-22 @ 04:54) (87 - 102)  BP: 158/70 (10-08-22 @ 04:54) (121/63 - 158/70)  RR: 18 (10-08-22 @ 04:54) (16 - 18)    Physical exam:   constitutional NAD, AAOX3, Respiratory  lungs CTA, CVS heart RRR, GI: abdomen Soft NT, ND, BS+, skin: intact  neuro exam Motor, sensory and CN normal, no deficit                           8.9    8.91  )-----------( 382      ( 08 Oct 2022 04:30 )             30.5

## 2022-10-08 NOTE — DISCHARGE NOTE PROVIDER - NSDCFUSCHEDAPPT_GEN_ALL_CORE_FT
[facility]  GASTRO  Lisa Stevens  Scheduled Appointment: 10/12/2022

## 2022-10-08 NOTE — DISCHARGE NOTE NURSING/CASE MANAGEMENT/SOCIAL WORK - NSDCFUADDAPPT_GEN_ALL_CORE_FT
Please follow up with your primary care physician within two weeks after discharge to update them on your recent hospitalization and to review any changes in your medications.  Please followup with the gastroenterology team on 10/12/22  please followup with Dr. Maloney the gastroenterology doctor in 1 week   If you have nay bleeding come back to the emergency department   please followup with dr. Galloway heart doctor in 2 weeks

## 2022-10-08 NOTE — DISCHARGE NOTE NURSING/CASE MANAGEMENT/SOCIAL WORK - PATIENT PORTAL LINK FT
You can access the FollowMyHealth Patient Portal offered by [facility] by registering at the following website: [email or web address]. By joining [employer]’s FollowMyHealth portal, you will also be able to view your health information using other applications (apps) compatible with our system.

## 2022-10-08 NOTE — DISCHARGE NOTE PROVIDER - CARE PROVIDER_API CALL
Toya Maloney (MD)  Residency  Medicine  [address] [place] [address]  Phone: [phone]  Fax: [phone]  Follow Up Time: 1 week    [place] [address]  Phone: [phone]  Fax: ()-  Follow Up Time: 2 weeks   Toya Maloney)  Residency  Medicine  [address] [place] [address]  Phone: [phone]  Fax: [phone]  Follow Up Time: 1 week    MEGGAN DEMPSEY  [facility]  [address] [place] [address]  Phone: [phone]  Fax: ()-  Follow Up Time: 2 weeks    Jeff Shields)  Internal Medicine  [address] [place] [address]  Phone: [phone]  Fax: [phone]  Follow Up Time: 2 weeks

## 2022-10-08 NOTE — DISCHARGE NOTE PROVIDER - PROVIDER TOKENS
PROVIDER:[TOKEN:[[id number]],FOLLOWUP:[1 week]],PROVIDER:[TOKEN:[[id number]],FOLLOWUP:[2 weeks]] PROVIDER:[TOKEN:[[id number]],FOLLOWUP:[1 week]],PROVIDER:[TOKEN:[[id number]],FOLLOWUP:[2 weeks]],PROVIDER:[TOKEN:[[id number]],FOLLOWUP:[2 weeks]]

## 2022-10-10 LAB — SURGICAL PATHOLOGY STUDY: SIGNIFICANT CHANGE UP

## 2022-10-12 ENCOUNTER — APPOINTMENT (OUTPATIENT)
Dept: GASTROENTEROLOGY | Facility: CLINIC | Age: 82
End: 2022-10-12

## 2022-10-12 ENCOUNTER — NON-APPOINTMENT (OUTPATIENT)
Age: 82
End: 2022-10-12

## 2022-10-12 ENCOUNTER — OUTPATIENT (OUTPATIENT)
Dept: OUTPATIENT SERVICES | Facility: HOSPITAL | Age: 82
LOS: 1 days | Discharge: HOME | End: 2022-10-12

## 2022-10-12 VITALS
HEART RATE: 87 BPM | OXYGEN SATURATION: 98 % | SYSTOLIC BLOOD PRESSURE: 127 MMHG | HEIGHT: 63 IN | DIASTOLIC BLOOD PRESSURE: 82 MMHG | TEMPERATURE: 97 F | BODY MASS INDEX: 36.68 KG/M2 | WEIGHT: 207 LBS

## 2022-10-12 DIAGNOSIS — Z78.9 OTHER SPECIFIED HEALTH STATUS: ICD-10-CM

## 2022-10-12 DIAGNOSIS — Z86.39 PERSONAL HISTORY OF OTHER ENDOCRINE, NUTRITIONAL AND METABOLIC DISEASE: ICD-10-CM

## 2022-10-12 DIAGNOSIS — I80.9 PHLEBITIS AND THROMBOPHLEBITIS OF UNSPECIFIED SITE: ICD-10-CM

## 2022-10-12 DIAGNOSIS — R10.11 RIGHT UPPER QUADRANT PAIN: ICD-10-CM

## 2022-10-12 DIAGNOSIS — K80.20 CALCULUS OF GALLBLADDER W/OUT CHOLECYSTITIS W/OUT OBSTRUCTION: ICD-10-CM

## 2022-10-12 PROCEDURE — 99204 OFFICE O/P NEW MOD 45 MIN: CPT | Mod: GC

## 2022-10-12 NOTE — REVIEW OF SYSTEMS
[Feeling Poorly] : feeling poorly [Feeling Tired] : feeling tired [Eye Pain] : eye pain [Fever] : no fever [Chills] : no chills [Recent Weight Gain (___ Lbs)] : no recent weight gain [Recent Weight Loss (___ Lbs)] : no recent weight loss [Red Eyes] : eyes not red [Scleral Icterus (Yellow Eyes)] : no scleral icterus [Loss Of Hearing] : no hearing loss [Sore Throat] : no sore throat [Hoarseness] : no hoarseness [Heart Rate Is Slow] : the heart rate was not slow [Heart Rate Is Fast] : the heart rate was not fast [Chest Pain] : no chest pain [Palpitations] : no palpitations

## 2022-10-12 NOTE — HISTORY OF PRESENT ILLNESS
[FreeTextEntry1] : 82 yo male recently discharged from [facility] after evaluation for anemia and episodes of black stool\par \par EGD: h Pylori gastritis\par Colonoscopy: diverticulosis\par \par The patient was referred to GI clinic for capsule endoscopy.\par The patient had been on Xarelto for A. fib but this was discontinued when she had black stools.\par \par The patient no longer has black stools.  The patient notes that she has right upper quadrant pain at night it can be severe without fevers or chills.  She was told in the past that she had a large gallstone, however no imaging was done at [facility] during this admission or during any prior evaluation here the patient is noting a pain on her right wrist that she says was from an IV site there is some discomfort and erythema around the area.  The patient also had a chest x-ray that showed a single pulmonary nodule that we will need work-up.\par

## 2022-10-12 NOTE — PHYSICAL EXAM
[None] : no edema [No CVA Tenderness] : no CVA  tenderness [No Spinal Tenderness] : no spinal tenderness [Normal] : oriented to person, place, and time [de-identified] : erythema at right wrist

## 2022-10-13 DIAGNOSIS — I48.0 PAROXYSMAL ATRIAL FIBRILLATION: ICD-10-CM

## 2022-10-13 DIAGNOSIS — K29.70 GASTRITIS, UNSPECIFIED, WITHOUT BLEEDING: ICD-10-CM

## 2022-10-13 DIAGNOSIS — Z79.890 HORMONE REPLACEMENT THERAPY: ICD-10-CM

## 2022-10-13 DIAGNOSIS — D50.9 IRON DEFICIENCY ANEMIA, UNSPECIFIED: ICD-10-CM

## 2022-10-13 DIAGNOSIS — Z79.01 LONG TERM (CURRENT) USE OF ANTICOAGULANTS: ICD-10-CM

## 2022-10-13 DIAGNOSIS — E03.9 HYPOTHYROIDISM, UNSPECIFIED: ICD-10-CM

## 2022-10-13 DIAGNOSIS — I10 ESSENTIAL (PRIMARY) HYPERTENSION: ICD-10-CM

## 2022-10-13 DIAGNOSIS — K20.90 ESOPHAGITIS, UNSPECIFIED WITHOUT BLEEDING: ICD-10-CM

## 2022-10-13 DIAGNOSIS — K64.4 RESIDUAL HEMORRHOIDAL SKIN TAGS: ICD-10-CM

## 2022-10-13 DIAGNOSIS — I48.20 CHRONIC ATRIAL FIBRILLATION, UNSPECIFIED: ICD-10-CM

## 2022-10-13 DIAGNOSIS — K64.0 FIRST DEGREE HEMORRHOIDS: ICD-10-CM

## 2022-10-13 DIAGNOSIS — E78.00 PURE HYPERCHOLESTEROLEMIA, UNSPECIFIED: ICD-10-CM

## 2022-10-13 DIAGNOSIS — K57.32 DIVERTICULITIS OF LARGE INTESTINE WITHOUT PERFORATION OR ABSCESS WITHOUT BLEEDING: ICD-10-CM

## 2022-10-13 DIAGNOSIS — K92.1 MELENA: ICD-10-CM

## 2022-10-13 DIAGNOSIS — D64.9 ANEMIA, UNSPECIFIED: ICD-10-CM

## 2022-10-13 DIAGNOSIS — D62 ACUTE POSTHEMORRHAGIC ANEMIA: ICD-10-CM

## 2022-10-13 DIAGNOSIS — Z88.0 ALLERGY STATUS TO PENICILLIN: ICD-10-CM

## 2022-10-13 LAB
ALBUMIN SERPL ELPH-MCNC: 4 G/DL
ALP BLD-CCNC: 92 U/L
ALT SERPL-CCNC: 20 U/L
ANION GAP SERPL CALC-SCNC: 10 MMOL/L
AST SERPL-CCNC: 22 U/L
BASOPHILS # BLD AUTO: 0.05 K/UL
BASOPHILS NFR BLD AUTO: 0.7 %
BILIRUB SERPL-MCNC: 0.5 MG/DL
BUN SERPL-MCNC: 15 MG/DL
CALCIUM SERPL-MCNC: 9.2 MG/DL
CHLORIDE SERPL-SCNC: 102 MMOL/L
CO2 SERPL-SCNC: 29 MMOL/L
CREAT SERPL-MCNC: 1 MG/DL
EGFR: 56 ML/MIN/1.73M2
EOSINOPHIL # BLD AUTO: 0.13 K/UL
EOSINOPHIL NFR BLD AUTO: 1.9 %
GLUCOSE SERPL-MCNC: 107 MG/DL
HCT VFR BLD CALC: 32.8 %
HGB BLD-MCNC: 9.4 G/DL
IMM GRANULOCYTES NFR BLD AUTO: 0.3 %
LPL SERPL-CCNC: 67 U/L
LYMPHOCYTES # BLD AUTO: 1.74 K/UL
LYMPHOCYTES NFR BLD AUTO: 25.2 %
MAN DIFF?: NORMAL
MCHC RBC-ENTMCNC: 24 PG
MCHC RBC-ENTMCNC: 28.7 G/DL
MCV RBC AUTO: 83.7 FL
MONOCYTES # BLD AUTO: 0.53 K/UL
MONOCYTES NFR BLD AUTO: 7.7 %
NEUTROPHILS # BLD AUTO: 4.43 K/UL
NEUTROPHILS NFR BLD AUTO: 64.2 %
PLATELET # BLD AUTO: 351 K/UL
POTASSIUM SERPL-SCNC: 4.9 MMOL/L
PROT SERPL-MCNC: 6.6 G/DL
RBC # BLD: 3.92 M/UL
RBC # FLD: 23.4 %
SODIUM SERPL-SCNC: 141 MMOL/L
WBC # FLD AUTO: 6.9 K/UL

## 2022-10-14 DIAGNOSIS — D64.9 ANEMIA, UNSPECIFIED: ICD-10-CM

## 2022-10-14 DIAGNOSIS — R91.1 SOLITARY PULMONARY NODULE: ICD-10-CM

## 2022-10-14 DIAGNOSIS — R10.11 RIGHT UPPER QUADRANT PAIN: ICD-10-CM

## 2022-10-14 DIAGNOSIS — K80.20 CALCULUS OF GALLBLADDER WITHOUT CHOLECYSTITIS WITHOUT OBSTRUCTION: ICD-10-CM

## 2022-10-14 DIAGNOSIS — I80.9 PHLEBITIS AND THROMBOPHLEBITIS OF UNSPECIFIED SITE: ICD-10-CM

## 2022-10-14 DIAGNOSIS — K29.70 GASTRITIS, UNSPECIFIED, WITHOUT BLEEDING: ICD-10-CM

## 2022-10-18 ENCOUNTER — APPOINTMENT (OUTPATIENT)
Dept: PULMONOLOGY | Facility: CLINIC | Age: 82
End: 2022-10-18

## 2022-10-18 ENCOUNTER — OUTPATIENT (OUTPATIENT)
Dept: OUTPATIENT SERVICES | Facility: HOSPITAL | Age: 82
LOS: 1 days | Discharge: HOME | End: 2022-10-18

## 2022-10-18 VITALS
SYSTOLIC BLOOD PRESSURE: 127 MMHG | WEIGHT: 210 LBS | BODY MASS INDEX: 37.21 KG/M2 | DIASTOLIC BLOOD PRESSURE: 81 MMHG | TEMPERATURE: 98.7 F | HEIGHT: 63 IN | OXYGEN SATURATION: 96 % | HEART RATE: 90 BPM

## 2022-10-18 DIAGNOSIS — R91.1 SOLITARY PULMONARY NODULE: ICD-10-CM

## 2022-10-18 PROCEDURE — 99203 OFFICE O/P NEW LOW 30 MIN: CPT | Mod: GC

## 2022-10-18 NOTE — END OF VISIT
[] : Resident [FreeTextEntry3] : Ms Mcgee was seen & examined with her family member in the room.  Her pulmonary nodule has been stable since at least 2018 without any noted growth, and after discussion with the patient and her family the overall low risk of malignancy, they do not desire any further workup at this time.  Her shortness of breath on exertion is associated with orthopnea, PND, and lower extremity edema.  Given her overall presentation, this appears to be cardiac in origin.  Fortunately the patient has a cardiology evaluation coming up within the week, so will defer workup to that visit.

## 2022-10-18 NOTE — HISTORY OF PRESENT ILLNESS
[TextBox_4] : 80 y/o F w/ pmhx of Leo (on Xarelto). Recently discharged on 10/8 after an admission for acute symptomatic anema s/p Colonoscopy/EGD w/ no active bleeding found. Currently following up with GI OP. \par \par During admission, 1.1cm GRISEL nodule was seen and was referred to Pulmonary Clinic.\par \par Patient endorses SOB for the past 3 months. She is following OP w/ Cardiology and GI. \par \par Denies cough, chest pain, abdominal pain.

## 2022-10-18 NOTE — PHYSICAL EXAM
[No Acute Distress] : no acute distress [Normal Rate/Rhythm] : normal rate/rhythm [No Resp Distress] : no resp distress [Clear to Auscultation Bilaterally] : clear to auscultation bilaterally [2+ Pitting] : 2+ pitting [No Focal Deficits] : no focal deficits [Oriented x3] : oriented x3 [Normal Affect] : normal affect [TextBox_105] : bilateral pitting edema

## 2022-10-22 RX ORDER — PANTOPRAZOLE SODIUM 20 MG/1
1 TABLET, DELAYED RELEASE ORAL
Qty: 14 | Refills: 0
Start: 2022-10-22 | End: 2022-11-04

## 2022-10-24 ENCOUNTER — OUTPATIENT (OUTPATIENT)
Dept: OUTPATIENT SERVICES | Facility: HOSPITAL | Age: 82
LOS: 1 days | Discharge: HOME | End: 2022-10-24

## 2022-10-24 ENCOUNTER — RESULT REVIEW (OUTPATIENT)
Age: 82
End: 2022-10-24

## 2022-10-24 DIAGNOSIS — K80.20 CALCULUS OF GALLBLADDER WITHOUT CHOLECYSTITIS WITHOUT OBSTRUCTION: ICD-10-CM

## 2022-10-24 PROCEDURE — 76705 ECHO EXAM OF ABDOMEN: CPT | Mod: 26

## 2022-10-26 ENCOUNTER — EMERGENCY (EMERGENCY)
Facility: HOSPITAL | Age: 82
LOS: 0 days | Discharge: HOME | End: 2022-10-26
Attending: EMERGENCY MEDICINE | Admitting: EMERGENCY MEDICINE

## 2022-10-26 ENCOUNTER — APPOINTMENT (OUTPATIENT)
Dept: CARDIOLOGY | Facility: CLINIC | Age: 82
End: 2022-10-26

## 2022-10-26 VITALS
HEART RATE: 95 BPM | DIASTOLIC BLOOD PRESSURE: 80 MMHG | TEMPERATURE: 97.4 F | BODY MASS INDEX: 38.09 KG/M2 | SYSTOLIC BLOOD PRESSURE: 122 MMHG | WEIGHT: 215 LBS | HEIGHT: 63 IN | OXYGEN SATURATION: 96 %

## 2022-10-26 VITALS
WEIGHT: 207.01 LBS | HEIGHT: 63 IN | SYSTOLIC BLOOD PRESSURE: 134 MMHG | DIASTOLIC BLOOD PRESSURE: 73 MMHG | TEMPERATURE: 97 F | RESPIRATION RATE: 17 BRPM | HEART RATE: 99 BPM | OXYGEN SATURATION: 96 %

## 2022-10-26 VITALS — RESPIRATION RATE: 18 BRPM

## 2022-10-26 DIAGNOSIS — Z88.0 ALLERGY STATUS TO PENICILLIN: ICD-10-CM

## 2022-10-26 DIAGNOSIS — K62.89 OTHER SPECIFIED DISEASES OF ANUS AND RECTUM: ICD-10-CM

## 2022-10-26 DIAGNOSIS — K64.5 PERIANAL VENOUS THROMBOSIS: ICD-10-CM

## 2022-10-26 DIAGNOSIS — I48.91 UNSPECIFIED ATRIAL FIBRILLATION: ICD-10-CM

## 2022-10-26 DIAGNOSIS — K64.8 OTHER HEMORRHOIDS: ICD-10-CM

## 2022-10-26 DIAGNOSIS — Z79.01 LONG TERM (CURRENT) USE OF ANTICOAGULANTS: ICD-10-CM

## 2022-10-26 DIAGNOSIS — I10 ESSENTIAL (PRIMARY) HYPERTENSION: ICD-10-CM

## 2022-10-26 DIAGNOSIS — Z86.2 PERSONAL HISTORY OF DISEASES OF THE BLOOD AND BLOOD-FORMING ORGANS AND CERTAIN DISORDERS INVOLVING THE IMMUNE MECHANISM: ICD-10-CM

## 2022-10-26 DIAGNOSIS — K62.5 HEMORRHAGE OF ANUS AND RECTUM: ICD-10-CM

## 2022-10-26 DIAGNOSIS — E03.9 HYPOTHYROIDISM, UNSPECIFIED: ICD-10-CM

## 2022-10-26 LAB
ALBUMIN SERPL ELPH-MCNC: 3.6 G/DL — SIGNIFICANT CHANGE UP (ref 3.5–5.2)
ALP SERPL-CCNC: 85 U/L — SIGNIFICANT CHANGE UP (ref 30–115)
ALT FLD-CCNC: 17 U/L — SIGNIFICANT CHANGE UP (ref 0–41)
ANION GAP SERPL CALC-SCNC: 7 MMOL/L — SIGNIFICANT CHANGE UP (ref 7–14)
APTT BLD: 46 SEC — HIGH (ref 27–39.2)
AST SERPL-CCNC: 19 U/L — SIGNIFICANT CHANGE UP (ref 0–41)
BASOPHILS # BLD AUTO: 0.04 K/UL — SIGNIFICANT CHANGE UP (ref 0–0.2)
BASOPHILS NFR BLD AUTO: 0.7 % — SIGNIFICANT CHANGE UP (ref 0–1)
BILIRUB SERPL-MCNC: 0.4 MG/DL — SIGNIFICANT CHANGE UP (ref 0.2–1.2)
BLD GP AB SCN SERPL QL: SIGNIFICANT CHANGE UP
BUN SERPL-MCNC: 15 MG/DL — SIGNIFICANT CHANGE UP (ref 10–20)
CALCIUM SERPL-MCNC: 8.7 MG/DL — SIGNIFICANT CHANGE UP (ref 8.4–10.5)
CHLORIDE SERPL-SCNC: 104 MMOL/L — SIGNIFICANT CHANGE UP (ref 98–110)
CO2 SERPL-SCNC: 30 MMOL/L — SIGNIFICANT CHANGE UP (ref 17–32)
CREAT SERPL-MCNC: 0.8 MG/DL — SIGNIFICANT CHANGE UP (ref 0.7–1.5)
EGFR: 73 ML/MIN/1.73M2 — SIGNIFICANT CHANGE UP
EOSINOPHIL # BLD AUTO: 0.06 K/UL — SIGNIFICANT CHANGE UP (ref 0–0.7)
EOSINOPHIL NFR BLD AUTO: 1 % — SIGNIFICANT CHANGE UP (ref 0–8)
GLUCOSE SERPL-MCNC: 106 MG/DL — HIGH (ref 70–99)
HCT VFR BLD CALC: 32 % — LOW (ref 37–47)
HGB BLD-MCNC: 9.7 G/DL — LOW (ref 12–16)
IMM GRANULOCYTES NFR BLD AUTO: 0.2 % — SIGNIFICANT CHANGE UP (ref 0.1–0.3)
INR BLD: 1.21 RATIO — SIGNIFICANT CHANGE UP (ref 0.65–1.3)
LYMPHOCYTES # BLD AUTO: 1.35 K/UL — SIGNIFICANT CHANGE UP (ref 1.2–3.4)
LYMPHOCYTES # BLD AUTO: 22.7 % — SIGNIFICANT CHANGE UP (ref 20.5–51.1)
MCHC RBC-ENTMCNC: 25 PG — LOW (ref 27–31)
MCHC RBC-ENTMCNC: 30.3 G/DL — LOW (ref 32–37)
MCV RBC AUTO: 82.5 FL — SIGNIFICANT CHANGE UP (ref 81–99)
MONOCYTES # BLD AUTO: 0.6 K/UL — SIGNIFICANT CHANGE UP (ref 0.1–0.6)
MONOCYTES NFR BLD AUTO: 10.1 % — HIGH (ref 1.7–9.3)
NEUTROPHILS # BLD AUTO: 3.89 K/UL — SIGNIFICANT CHANGE UP (ref 1.4–6.5)
NEUTROPHILS NFR BLD AUTO: 65.3 % — SIGNIFICANT CHANGE UP (ref 42.2–75.2)
NRBC # BLD: 0 /100 WBCS — SIGNIFICANT CHANGE UP (ref 0–0)
PLATELET # BLD AUTO: 271 K/UL — SIGNIFICANT CHANGE UP (ref 130–400)
POTASSIUM SERPL-MCNC: 4.3 MMOL/L — SIGNIFICANT CHANGE UP (ref 3.5–5)
POTASSIUM SERPL-SCNC: 4.3 MMOL/L — SIGNIFICANT CHANGE UP (ref 3.5–5)
PROT SERPL-MCNC: 6.1 G/DL — SIGNIFICANT CHANGE UP (ref 6–8)
PROTHROM AB SERPL-ACNC: 13.9 SEC — HIGH (ref 9.95–12.87)
RBC # BLD: 3.88 M/UL — LOW (ref 4.2–5.4)
RBC # FLD: 24.3 % — HIGH (ref 11.5–14.5)
SODIUM SERPL-SCNC: 141 MMOL/L — SIGNIFICANT CHANGE UP (ref 135–146)
WBC # BLD: 5.95 K/UL — SIGNIFICANT CHANGE UP (ref 4.8–10.8)
WBC # FLD AUTO: 5.95 K/UL — SIGNIFICANT CHANGE UP (ref 4.8–10.8)

## 2022-10-26 PROCEDURE — 99284 EMERGENCY DEPT VISIT MOD MDM: CPT

## 2022-10-26 PROCEDURE — 93000 ELECTROCARDIOGRAM COMPLETE: CPT

## 2022-10-26 PROCEDURE — 99213 OFFICE O/P EST LOW 20 MIN: CPT | Mod: 25

## 2022-10-26 RX ORDER — METOPROLOL SUCCINATE 25 MG/1
25 TABLET, EXTENDED RELEASE ORAL
Qty: 30 | Refills: 0 | Status: DISCONTINUED | COMMUNITY
Start: 2022-05-09

## 2022-10-26 RX ORDER — LEVOTHYROXINE SODIUM 0.1 MG/1
100 TABLET ORAL
Qty: 90 | Refills: 0 | Status: DISCONTINUED | COMMUNITY
Start: 2022-05-17

## 2022-10-26 RX ORDER — RIVAROXABAN 20 MG/1
20 TABLET, FILM COATED ORAL
Qty: 30 | Refills: 0 | Status: DISCONTINUED | COMMUNITY
Start: 2022-03-03

## 2022-10-26 RX ORDER — CYANOCOBALAMIN 1000 UG/ML
1000 INJECTION INTRAMUSCULAR; SUBCUTANEOUS
Qty: 4 | Refills: 0 | Status: DISCONTINUED | COMMUNITY
Start: 2022-10-05

## 2022-10-26 RX ORDER — OMEPRAZOLE 20 MG/1
20 TABLET, DELAYED RELEASE ORAL
Qty: 28 | Refills: 0 | Status: DISCONTINUED | COMMUNITY
Start: 2022-10-04

## 2022-10-26 RX ORDER — METOPROLOL SUCCINATE 50 MG/1
50 TABLET, EXTENDED RELEASE ORAL
Qty: 30 | Refills: 0 | Status: DISCONTINUED | COMMUNITY
Start: 2022-10-20

## 2022-10-26 RX ORDER — LIDOCAINE/HYDROCORTISONE AC 3 %-0.5 %
1 CREAM WITH APPLICATOR RECTAL
Qty: 30 | Refills: 0
Start: 2022-10-26 | End: 2022-11-01

## 2022-10-26 RX ORDER — AZITHROMYCIN 250 MG/1
250 TABLET, FILM COATED ORAL
Qty: 6 | Refills: 0 | Status: DISCONTINUED | COMMUNITY
Start: 2022-05-09

## 2022-10-26 RX ORDER — ROSUVASTATIN CALCIUM 40 MG/1
40 TABLET, FILM COATED ORAL
Qty: 30 | Refills: 0 | Status: DISCONTINUED | COMMUNITY
Start: 2022-07-07

## 2022-10-26 RX ORDER — FLUTICASONE PROPIONATE 110 UG/1
110 AEROSOL, METERED RESPIRATORY (INHALATION)
Qty: 12 | Refills: 0 | Status: DISCONTINUED | COMMUNITY
Start: 2022-05-02

## 2022-10-26 RX ORDER — FLUTICASONE PROPIONATE 50 UG/1
50 SPRAY, METERED NASAL
Qty: 16 | Refills: 0 | Status: DISCONTINUED | COMMUNITY
Start: 2022-05-09

## 2022-10-26 RX ORDER — POLYETHYLENE GLYCOL 3350 17 G/17G
17 POWDER, FOR SOLUTION ORAL
Qty: 8 | Refills: 0 | Status: DISCONTINUED | COMMUNITY
Start: 2022-10-12 | End: 2022-10-26

## 2022-10-26 NOTE — ASSESSMENT
[FreeTextEntry1] : 82 year old woman with atrial fibrillation, HTN and HLD who presents to establish care. \par \par 1. Shortness of breath: most likely from anemia however she also has leg swelling. Will do an echocardiogram for further evaluation. \par - Start lasix 40mg daily; BMP in one week\par \par 2. Atrial fibrillation: symptomatic; NOAC on hold due to active bleeding. Her CHADSVASC is 4 so we will need to start as ASAP when we are cleared from GI. Otherwise, we will need to consider Watchman. \par - Increase metoprolol tartrate to 50mg BID\par \par 3. HTN: BP at goal today. Her goal is <130/80 mmhg\par - Continue anti-hypertensive therapy\par \par 4. HLD: Continue statin\par \par RTC in two weeks.\par \par

## 2022-10-26 NOTE — ED PROVIDER NOTE - PATIENT PORTAL LINK FT
You can access the FollowMyHealth Patient Portal offered by [facility] by registering at the following website: [email or web address]. By joining [employer]’s FollowMyHealth portal, you will also be able to view your health information using other applications (apps) compatible with our system.

## 2022-10-26 NOTE — ED PROVIDER NOTE - ATTENDING APP SHARED VISIT CONTRIBUTION OF CARE
83 yo f with pmh of htn, hypothyroidism, anemia, afib on xarelto, presents with c/o rectal bleeding.  pt was recently admitted for anemia, transfused and had egd/cscope, wasn't found to have any active bleeding. 81 yo f with pmh of htn, hypothyroidism, anemia, afib on xarelto, presents with c/o rectal bleeding.  pt was recently admitted for anemia, transfused and had egd/cscope, wasn't found to have any active bleeding.  dc home but dc'd xarelto.  noted with brbpr today with straining bm.  no abd pain ,no rectal pain. exam: nad, ncat, perrl, eomi, mmm, rrr, ctab, abd soft, nt, nd, AUGUSTA with external hemorrhoids, +internal hemorrhoids, BRBPR, no melena, aox3, imp: pt with rectal bleeding, no longer on xarelto, will check labs and consult gi as was recently seen by them

## 2022-10-26 NOTE — ED PROVIDER NOTE - NS ED ATTENDING STATEMENT MOD
This was a shared visit with the LYLY. I reviewed and verified the documentation and independently performed the documented:

## 2022-10-26 NOTE — ED PROVIDER NOTE - PHYSICAL EXAMINATION
Physical Exam    Constitutional: No acute distress.   Eyes: Conjunctiva pink, Sclera clear, PERRLA, EOMI.  ENT: No sinus tenderness. No nasal discharge. No oropharyngeal erythema, edema, or exudates. Uvula midline.   Cardiovascular: Regular rate No noted murmurs rubs or gallops.  Respiratory: unlabored respiratory effort, clear to auscultation bilaterally no wheezing, rales or rhonchi  Gastrointestinal: Normal bowel sounds. soft, non distended, non-tender abdomen.   Rectum: External bleeding hemorrhoid slightly fluctuant, no noted pus. Bleeding internal hemorrhoid.   Musculoskeletal: supple neck, no midline tenderness. No joint or bony deformity.   Integumentary: warm, dry, no rash  Neurologic: awake, alert, cranial nerves II-XII grossly intact, extremities’ motor and sensory functions grossly intact  Psychiatric: appropriate mood, appropriate affect

## 2022-10-26 NOTE — ED PROVIDER NOTE - NSFOLLOWUPINSTRUCTIONS_ED_ALL_ED_FT
Please use rectal cream twice per day for 7 days. Follow up with Dr. Gary outpatient.     Hemorrhoids    WHAT YOU NEED TO KNOW:    Hemorrhoids are swollen blood vessels inside your rectum (internal hemorrhoids) or on your anus (external hemorrhoids). Sometimes a hemorrhoid may prolapse. This means it extends out of your anus.    DISCHARGE INSTRUCTIONS:    Return to the emergency department if:   •You have severe pain in your rectum or around your anus.      •You have severe pain in your abdomen and you are vomiting.       •You have bleeding from your anus that soaks through your underwear.       Contact your healthcare provider if:   •You have frequent and painful bowel movements.      •Your hemorrhoid looks or feels more swollen than usual.       •You do not have a bowel movement for 2 days or more.       •You see or feel tissue coming through your anus.       •You have questions or concerns about your condition or care.      Medicines: You may need any of the following:   •A pad, cream, or ointment can help decrease pain, swelling, and itching.       •Stool softeners help treat or prevent constipation.       •NSAIDs, such as ibuprofen, help decrease swelling, pain, and fever. NSAIDs can cause stomach bleeding or kidney problems in certain people. If you take blood thinner medicine, always ask your healthcare provider if NSAIDs are safe for you. Always read the medicine label and follow directions.      •Take your medicine as directed. Contact your healthcare provider if you think your medicine is not helping or if you have side effects. Tell him or her if you are allergic to any medicine. Keep a list of the medicines, vitamins, and herbs you take. Include the amounts, and when and why you take them. Bring the list or the pill bottles to follow-up visits. Carry your medicine list with you in case of an emergency.      Manage your symptoms:   •Apply ice on your anus for 15 to 20 minutes every hour or as directed. Use an ice pack, or put crushed ice in a plastic bag. Cover it with a towel before you apply it to your anus. Ice helps prevent tissue damage and decreases swelling and pain.      •Take a sitz bath. Fill a bathtub with 4 to 6 inches of warm water. You may also use a sitz bath pan that fits inside a toilet bowl. Sit in the sitz bath for 15 minutes. Do this 3 times a day, and after each bowel movement. The warm water can help decrease pain and swelling.       •Keep your anal area clean. Gently wash the area with warm water daily. Soap may irritate the area. After a bowel movement, wipe with moist towelettes or wet toilet paper. Dry toilet paper can irritate the area.       Prevent hemorrhoids:   •Do not strain to have a bowel movement. Do not sit on the toilet too long. These actions can increase pressure on the tissues in your rectum and anus.       •Drink plenty of liquids. Liquids can help prevent constipation. Ask how much liquid to drink each day and which liquids are best for you.       •Eat a variety of high-fiber foods. Examples include fruits, vegetables, and whole grains. Ask your healthcare provider how much fiber you need each day. You may need to take a fiber supplement.              •Exercise as directed. Exercise, such as walking, may make it easier to have a bowel movement. Ask your healthcare provider to help you create an exercise plan.       •Do not have anal sex. Anal sex can weaken the skin around your rectum and anus.       •Avoid heavy lifting. This can cause straining and increase your risk for another hemorrhoid.       Follow up with your doctor as directed: Write down your questions so you remember to ask them during your visits.        © Copyright [employer] 2021           back to top                          © Copyright [employer] 2021

## 2022-10-26 NOTE — HISTORY OF PRESENT ILLNESS
[FreeTextEntry1] : 82 year old woman with atrial fibrillation, HTN and HLD presents to establish care. \par \par She was admitted to the hospital from October 6 to October 8 with shortness of breath and was found to be anemic to 6.6g. She was given 2 units PRBCs and had an endoscopy/colonoscopy that did not find any active bleeding. She saw GI as outpatient and is scheduled for a capsule endoscopy. In the interim, NOAC has been held. She continue to have shortness of breath, worse with exertion. She also feels palpitations. She denies chest pain. She feels dizzy and has loss of balance but has not had syncope. She has leg swelling. She denies orthopnea and PND. She has not had any more blood in her stool but does have a bump on her rectum that is painful.

## 2022-10-26 NOTE — ED PROVIDER NOTE - PROGRESS NOTE DETAILS
HGB stable. GI evaluated and recommended Hydrocortisone lidocaine topical cream for bleeding hemorrhoids. Patient will be discharged to follow up with Dr. Gary outpatient.

## 2022-10-26 NOTE — ED PROVIDER NOTE - CLINICAL SUMMARY MEDICAL DECISION MAKING FREE TEXT BOX
pt with rectal bleeding, no longer on xarelto, labs reassuring, seen and cleared by GI and will f/u outpt

## 2022-10-26 NOTE — ED PROVIDER NOTE - NS ED ROS FT
Constitutional: (-) fever  Eyes/ENT: (-) blurry vision, (-) epistaxis  Cardiovascular: (-) chest pain, (-) syncope  Respiratory: (-) cough, (-) shortness of breath  Gastrointestinal: (-) vomiting, (-) diarrhea (+) rectal pain and bleeding  Musculoskeletal: (-) neck pain, (-) back pain, (-) joint pain  Integumentary: (-) rash, (-) edema  Neurological: (-) headache, (-) altered mental status  Psychiatric: (-) hallucinations  Allergic/Immunologic: (-) pruritus

## 2022-10-26 NOTE — ED PROVIDER NOTE - OBJECTIVE STATEMENT
82 year old female with a history of A. Fib currently off of Xarelto, HTN, hypothyroidism and anemia presents to the ED with rectal pain and bleeding. Patient was recently admitted with hgb 6.6 underwent colonoscopy and endoscopy to find non bleeding diverticulitis as well as internal and external hemorrhoids. Now returns with rectal pain and pressure x 2 days. Today she noted bright red blood per rectum after which pain improved. denies fever, chills, chest pain, cough, shortness of breath, abdominal pain, nausea, vomiting, diarrhea. oriented to person, place, time and situation

## 2022-10-26 NOTE — PHYSICAL EXAM

## 2022-10-26 NOTE — REVIEW OF SYSTEMS
[Feeling Fatigued] : feeling fatigued [SOB] : shortness of breath [Dyspnea on exertion] : dyspnea during exertion [Lower Ext Edema] : lower extremity edema [Palpitations] : palpitations [Blood in Stool] : blood in stool [Easy Bleeding] : a tendency for easy bleeding [Easy Bruising] : a tendency for easy bruising [Negative] : Heme/Lymph

## 2022-10-26 NOTE — ED PROVIDER NOTE - CARE PROVIDER_API CALL
Keo Gary)  Gastroenterology; Internal Medicine  [address] [place] [address]  Phone: [phone]  Fax: [phone]  Follow Up Time:

## 2022-11-02 ENCOUNTER — OUTPATIENT (OUTPATIENT)
Dept: OUTPATIENT SERVICES | Facility: HOSPITAL | Age: 82
LOS: 1 days | Discharge: HOME | End: 2022-11-02

## 2022-11-02 ENCOUNTER — NON-APPOINTMENT (OUTPATIENT)
Age: 82
End: 2022-11-02

## 2022-11-02 ENCOUNTER — APPOINTMENT (OUTPATIENT)
Dept: GASTROENTEROLOGY | Facility: CLINIC | Age: 82
End: 2022-11-02

## 2022-11-02 ENCOUNTER — APPOINTMENT (OUTPATIENT)
Dept: CARDIOLOGY | Facility: CLINIC | Age: 82
End: 2022-11-02
Payer: MEDICARE

## 2022-11-02 VITALS
BODY MASS INDEX: 38.45 KG/M2 | TEMPERATURE: 97 F | OXYGEN SATURATION: 98 % | SYSTOLIC BLOOD PRESSURE: 132 MMHG | HEART RATE: 75 BPM | DIASTOLIC BLOOD PRESSURE: 81 MMHG | WEIGHT: 217 LBS | HEIGHT: 63 IN

## 2022-11-02 DIAGNOSIS — D64.9 ANEMIA, UNSPECIFIED: ICD-10-CM

## 2022-11-02 DIAGNOSIS — K64.9 UNSPECIFIED HEMORRHOIDS: ICD-10-CM

## 2022-11-02 DIAGNOSIS — R19.5 OTHER FECAL ABNORMALITIES: ICD-10-CM

## 2022-11-02 DIAGNOSIS — K86.2 CYST OF PANCREAS: ICD-10-CM

## 2022-11-02 DIAGNOSIS — K29.70 GASTRITIS, UNSPECIFIED, WITHOUT BLEEDING: ICD-10-CM

## 2022-11-02 PROCEDURE — 99214 OFFICE O/P EST MOD 30 MIN: CPT | Mod: GC

## 2022-11-02 PROCEDURE — 93306 TTE W/DOPPLER COMPLETE: CPT

## 2022-11-03 ENCOUNTER — APPOINTMENT (OUTPATIENT)
Dept: CARDIOLOGY | Facility: CLINIC | Age: 82
End: 2022-11-03

## 2022-11-03 ENCOUNTER — NON-APPOINTMENT (OUTPATIENT)
Age: 82
End: 2022-11-03

## 2022-11-03 LAB
ANION GAP SERPL CALC-SCNC: 10 MMOL/L
BUN SERPL-MCNC: 19 MG/DL
CALCIUM SERPL-MCNC: 9.1 MG/DL
CHLORIDE SERPL-SCNC: 102 MMOL/L
CO2 SERPL-SCNC: 31 MMOL/L
CREAT SERPL-MCNC: 0.9 MG/DL
EGFR: 63 ML/MIN/1.73M2
GLUCOSE SERPL-MCNC: 106 MG/DL
POTASSIUM SERPL-SCNC: 5.1 MMOL/L
SODIUM SERPL-SCNC: 143 MMOL/L

## 2022-11-08 ENCOUNTER — APPOINTMENT (OUTPATIENT)
Dept: CARDIOLOGY | Facility: CLINIC | Age: 82
End: 2022-11-08

## 2022-11-14 NOTE — ED PROVIDER NOTE - RATE
Body Location Override (Optional - Billing Will Still Be Based On Selected Body Map Location If Applicable): right medial lower forehead Anesthesia Volume In Cc: 0 Did You Provide Opioid Counseling: No Repair Type: Complex Repair Suturegard Retention Suture: 2-0 Nylon Retention Suture Bite Size: 3 mm Length To Time In Minutes Device Was In Place: 10 Number Of Hemigard Strips Per Side: 1 Simple / Intermediate / Complex Repair - Final Wound Length In Cm: 3.5 Complex Requirements: Extensive Undermining Performed?: Yes Width Of Defect Perpendicular To Closure In Cm (Required): 2.2 Undermining Type: Entire Wound Debridement Text: The wound edges were debrided prior to proceeding with the closure to facilitate wound healing. Helical Rim Text: The closure involved the helical rim. Vermilion Border Text: The closure involved the vermilion border. Nostril Rim Text: The closure involved the nostril rim. Retention Suture Text: Retention sutures were placed to support the closure and prevent dehiscence. Primary Defect Length (In Cm): 1.5 Primary Defect Width (In Cm): 2 Skin Substitute: EpiFix Micronized Suture Removal: 7 days Detail Level: Detailed Anesthesia Type: 1% lidocaine with epinephrine Additional Anesthesia Volume In Cc: 6 Hemostasis: Electrocautery Estimated Blood Loss (Cc): minimal Brow Lift Text: A midfrontal incision was made medially to the defect to allow access to the tissues just superior to the left eyebrow. Following careful dissection inferiorly in a supraperiosteal plane to the level of the left eyebrow, several 3-0 monocryl sutures were used to resuspend the eyebrow orbicularis oculi muscular unit to the superior frontal bone periosteum. This resulted in an appropriate reapproximation of static eyebrow symmetry and correction of the left brow ptosis. Deep Sutures: 4-0 Monocryl Epidermal Sutures: 5-0 Ethilon Epidermal Closure: running Wound Care: Petrolatum Dressing: pressure dressing with telfa Unna Boot Text: An Unna boot was placed to help immobilize the limb and facilitate more rapid healing. Suturegard Intro: Intraoperative tissue expansion was performed, utilizing the SUTUREGARD device, in order to reduce wound tension. Suturegard Body: The suture ends were repeatedly re-tightened and re-clamped to achieve the desired tissue expansion. Hemigard Intro: Due to skin fragility and wound tension, it was decided to use HEMIGARD adhesive retention suture devices to permit a linear closure. The skin was cleaned and dried for a 6cm distance away from the wound. Excessive hair, if present, was removed to allow for adhesion. Hemigard Postcare Instructions: The HEMIGARD strips are to remain completely dry for at least 5-7 days. Epidermal Closure Graft Donor Site (Optional): simple interrupted Graft Donor Site Bandage (Optional-Leave Blank If You Don't Want In Note): Steri-strips and a pressure bandage were applied to the donor site. Closure 2 Information: This tab is for additional flaps and grafts, including complex repair and grafts and complex repair and flaps. You can also specify a different location for the additional defect, if the location is the same you do not need to select a new one. We will insert the automated text for the repair you select below just as we do for solitary flaps and grafts. Please note that at this time if you select a location with a different insurance zone you will need to override the ICD10 and CPT if appropriate. Closure 3 Information: This tab is for additional flaps and grafts above and beyond our usual structured repairs. Please note if you enter information here it will not currently bill and you will need to add the billing information manually. Closure 4 Information: This tab is for additional flaps and grafts above and beyond our usual structured repairs.  Please note if you enter information here it will not currently bill and you will need to add the billing information manually. 91 Complex Repair Preamble Text (Leave Blank If You Do Not Want): Extensive wide undermining was performed. Intermediate Repair Preamble Text (Leave Blank If You Do Not Want): Undermining was performed with blunt dissection. Flap Thinning Complex Repair Preamble Text (Leave Blank If You Do Not Want): An incision was made along the previous flap suture line. Undermining was performed beneath the flap and redundant tissue was removed to restore the normal contour of the skin. Non-Graft Cartilage Fenestration Text: The cartilage was fenestrated with a 2mm punch biopsy to help facilitate healing. Graft Cartilage Fenestration Text: The cartilage was fenestrated with a 2mm punch biopsy to help facilitate graft survival and healing. Preparation Of Recipient Site - Flap: The eschar was removed surgically with sharp dissection to facilitate appropriate wound healing of the following adjacent tissue rearrangement. Preparation Of Recipient Site - Graft: The eschar was removed surgically with sharp dissection to facilitate appropriate survival of the following graft. Preparation Of Recipient Site - Flap Takedown: The eschar and granulation tissue was removed surgically with sharp dissection to facilitate appropriate healing after division and inset of the proximal and distal interpolation flap. Secondary Intention Text (Leave Blank If You Do Not Want): The defect will heal with secondary intention. No Repair - Repaired With Adjacent Surgical Defect Text (Leave Blank If You Do Not Want): After obtaining clear surgical margins the defect was repaired concurrently with another surgical defect which was in close approximation. Referred To Oculoplastics For Closure Text (Leave Blank If You Do Not Want): After obtaining clear surgical margins the patient was sent to oculoplastics for surgical repair. The patient understands they will receive post-surgical care and follow-up from the referring physician's office. Referred To Otolaryngology For Closure Text (Leave Blank If You Do Not Want): After obtaining clear surgical margins the patient was sent to otolaryngology for surgical repair. The patient understands they will receive post-surgical care and follow-up from the referring physician's office. Referred To Plastics For Closure Text (Leave Blank If You Do Not Want): After obtaining clear surgical margins the patient was sent to plastics for surgical repair. The patient understands they will receive post-surgical care and follow-up from the referring physician's office. Referred To Asc For Closure Text (Leave Blank If You Do Not Want): After obtaining clear surgical margins the patient was sent to an [facility] for surgical repair. The patient understands they will receive post-surgical care and follow-up from the [facility] physician. Referred To Mid-Level For Closure Text (Leave Blank If You Do Not Want): After obtaining clear surgical margins the patient was sent to a mid-level provider for surgical repair. The patient understands they will receive post-surgical care and follow-up from the mid-level provider. Repair Performed By Another Provider Text (Leave Blank If You Do Not Want): After obtaining clear surgical margins the defect was repaired by another provider. Adjacent Tissue Transfer Text: The defect edges were debeveled with a #15 scalpel blade. Given the location of the defect and the proximity to free margins an adjacent tissue transfer was deemed most appropriate. Using a sterile surgical marker, an appropriate flap was drawn incorporating the defect and placing the expected incisions within the relaxed skin tension lines where possible. The area thus outlined was incised deep to adipose tissue with a #15 scalpel blade. The skin margins were undermined to an appropriate distance in all directions utilizing iris scissors. Advancement Flap (Single) Text: The defect edges were debeveled with a #15 scalpel blade. Given the location of the defect and the proximity to free margins a single advancement flap was deemed most appropriate. Using a sterile surgical marker, an appropriate advancement flap was drawn incorporating the defect and placing the expected incisions within the relaxed skin tension lines where possible. The area thus outlined was incised deep to adipose tissue with a #15 scalpel blade. The skin margins were undermined to an appropriate distance in all directions utilizing iris scissors. Advancement Flap (Double) Text: The defect edges were debeveled with a #15 scalpel blade. Given the location of the defect and the proximity to free margins a double advancement flap was deemed most appropriate. Using a sterile surgical marker, the appropriate advancement flaps were drawn incorporating the defect and placing the expected incisions within the relaxed skin tension lines where possible. The area thus outlined was incised deep to adipose tissue with a #15 scalpel blade. The skin margins were undermined to an appropriate distance in all directions utilizing iris scissors. Burow's Advancement Flap Text: The defect edges were debeveled with a #15 scalpel blade. Given the location of the defect and the proximity to free margins a Burow's advancement flap was deemed most appropriate. Using a sterile surgical marker, the appropriate advancement flap was drawn incorporating the defect and placing the expected incisions within the relaxed skin tension lines where possible. The area thus outlined was incised deep to adipose tissue with a #15 scalpel blade. The skin margins were undermined to an appropriate distance in all directions utilizing iris scissors. Chonodrocutaneous Helical Advancement Flap Text: The defect edges were debeveled with a #15 scalpel blade. Given the location of the defect and the proximity to free margins a chondrocutaneous helical advancement flap was deemed most appropriate. Using a sterile surgical marker, the appropriate advancement flap was drawn incorporating the defect and placing the expected incisions within the relaxed skin tension lines where possible. The area thus outlined was incised deep to adipose tissue with a #15 scalpel blade. The skin margins were undermined to an appropriate distance in all directions utilizing iris scissors. Crescentic Advancement Flap Text: The defect edges were debeveled with a #15 scalpel blade. Given the location of the defect and the proximity to free margins a crescentic advancement flap was deemed most appropriate. Using a sterile surgical marker, the appropriate advancement flap was drawn incorporating the defect and placing the expected incisions within the relaxed skin tension lines where possible. The area thus outlined was incised deep to adipose tissue with a #15 scalpel blade. The skin margins were undermined to an appropriate distance in all directions utilizing iris scissors. A-T Advancement Flap Text: The defect edges were debeveled with a #15 scalpel blade. Given the location of the defect, shape of the defect and the proximity to free margins an A-T advancement flap was deemed most appropriate. Using a sterile surgical marker, an appropriate advancement flap was drawn incorporating the defect and placing the expected incisions within the relaxed skin tension lines where possible. The area thus outlined was incised deep to adipose tissue with a #15 scalpel blade. The skin margins were undermined to an appropriate distance in all directions utilizing iris scissors. O-T Advancement Flap Text: The defect edges were debeveled with a #15 scalpel blade. Given the location of the defect, shape of the defect and the proximity to free margins an O-T advancement flap was deemed most appropriate. Using a sterile surgical marker, an appropriate advancement flap was drawn incorporating the defect and placing the expected incisions within the relaxed skin tension lines where possible. The area thus outlined was incised deep to adipose tissue with a #15 scalpel blade. The skin margins were undermined to an appropriate distance in all directions utilizing iris scissors. O-L Flap Text: The defect edges were debeveled with a #15 scalpel blade. Given the location of the defect, shape of the defect and the proximity to free margins an O-L flap was deemed most appropriate. Using a sterile surgical marker, an appropriate advancement flap was drawn incorporating the defect and placing the expected incisions within the relaxed skin tension lines where possible. The area thus outlined was incised deep to adipose tissue with a #15 scalpel blade. The skin margins were undermined to an appropriate distance in all directions utilizing iris scissors. O-Z Flap Text: The defect edges were debeveled with a #15 scalpel blade. Given the location of the defect, shape of the defect and the proximity to free margins an O-Z flap was deemed most appropriate. Using a sterile surgical marker, an appropriate transposition flap was drawn incorporating the defect and placing the expected incisions within the relaxed skin tension lines where possible. The area thus outlined was incised deep to adipose tissue with a #15 scalpel blade. The skin margins were undermined to an appropriate distance in all directions utilizing iris scissors. Double O-Z Flap Text: The defect edges were debeveled with a #15 scalpel blade. Given the location of the defect, shape of the defect and the proximity to free margins a Double O-Z flap was deemed most appropriate. Using a sterile surgical marker, an appropriate transposition flap was drawn incorporating the defect and placing the expected incisions within the relaxed skin tension lines where possible. The area thus outlined was incised deep to adipose tissue with a #15 scalpel blade. The skin margins were undermined to an appropriate distance in all directions utilizing iris scissors. V-Y Flap Text: The defect edges were debeveled with a #15 scalpel blade. Given the location of the defect, shape of the defect and the proximity to free margins a V-Y flap was deemed most appropriate. Using a sterile surgical marker, an appropriate advancement flap was drawn incorporating the defect and placing the expected incisions within the relaxed skin tension lines where possible. The area thus outlined was incised deep to adipose tissue with a #15 scalpel blade. The skin margins were undermined to an appropriate distance in all directions utilizing iris scissors. Advancement-Rotation Flap Text: The defect edges were debeveled with a #15 scalpel blade. Given the location of the defect, shape of the defect and the proximity to free margins an advancement-rotation flap was deemed most appropriate. Using a sterile surgical marker, an appropriate flap was drawn incorporating the defect and placing the expected incisions within the relaxed skin tension lines where possible. The area thus outlined was incised deep to adipose tissue with a #15 scalpel blade. The skin margins were undermined to an appropriate distance in all directions utilizing iris scissors. Mercedes Flap Text: The defect edges were debeveled with a #15 scalpel blade. Given the location of the defect, shape of the defect and the proximity to free margins a Mercedes flap was deemed most appropriate. Using a sterile surgical marker, an appropriate advancement flap was drawn incorporating the defect and placing the expected incisions within the relaxed skin tension lines where possible. The area thus outlined was incised deep to adipose tissue with a #15 scalpel blade. The skin margins were undermined to an appropriate distance in all directions utilizing iris scissors. Modified Advancement Flap Text: The defect edges were debeveled with a #15 scalpel blade. Given the location of the defect, shape of the defect and the proximity to free margins a modified advancement flap was deemed most appropriate. Using a sterile surgical marker, an appropriate advancement flap was drawn incorporating the defect and placing the expected incisions within the relaxed skin tension lines where possible. The area thus outlined was incised deep to adipose tissue with a #15 scalpel blade. The skin margins were undermined to an appropriate distance in all directions utilizing iris scissors. Mucosal Advancement Flap Text: Given the location of the defect, shape of the defect and the proximity to free margins a mucosal advancement flap was deemed most appropriate. Incisions were made with a 15 blade scalpel in the appropriate fashion along the cutaneous vermilion border and the mucosal lip. The remaining actinically damaged mucosal tissue was excised. The mucosal advancement flap was then elevated to the gingival sulcus with care taken to preserve the neurovascular structures and advanced into the primary defect. Care was taken to ensure that precise realignment of the vermilion border was achieved. Peng Advancement Flap Text: The defect edges were debeveled with a #15 scalpel blade. Given the location of the defect, shape of the defect and the proximity to free margins a Peng advancement flap was deemed most appropriate. Using a sterile surgical marker, an appropriate advancement flap was drawn incorporating the defect and placing the expected incisions within the relaxed skin tension lines where possible. The area thus outlined was incised deep to adipose tissue with a #15 scalpel blade. The skin margins were undermined to an appropriate distance in all directions utilizing iris scissors. Hatchet Flap Text: The defect edges were debeveled with a #15 scalpel blade. Given the location of the defect, shape of the defect and the proximity to free margins a hatchet flap was deemed most appropriate. Using a sterile surgical marker, an appropriate hatchet flap was drawn incorporating the defect and placing the expected incisions within the relaxed skin tension lines where possible. The area thus outlined was incised deep to adipose tissue with a #15 scalpel blade. The skin margins were undermined to an appropriate distance in all directions utilizing iris scissors. Rotation Flap Text: The defect edges were debeveled with a #15 scalpel blade. Given the location of the defect, shape of the defect and the proximity to free margins a rotation flap was deemed most appropriate. Using a sterile surgical marker, an appropriate rotation flap was drawn incorporating the defect and placing the expected incisions within the relaxed skin tension lines where possible. The area thus outlined was incised deep to adipose tissue with a #15 scalpel blade. The skin margins were undermined to an appropriate distance in all directions utilizing iris scissors. Spiral Flap Text: The defect edges were debeveled with a #15 scalpel blade. Given the location of the defect, shape of the defect and the proximity to free margins a spiral flap was deemed most appropriate. Using a sterile surgical marker, an appropriate rotation flap was drawn incorporating the defect and placing the expected incisions within the relaxed skin tension lines where possible. The area thus outlined was incised deep to adipose tissue with a #15 scalpel blade. The skin margins were undermined to an appropriate distance in all directions utilizing iris scissors. Staged Advancement Flap Text: The defect edges were debeveled with a #15 scalpel blade. Given the location of the defect, shape of the defect and the proximity to free margins a staged advancement flap was deemed most appropriate. Using a sterile surgical marker, an appropriate advancement flap was drawn incorporating the defect and placing the expected incisions within the relaxed skin tension lines where possible. The area thus outlined was incised deep to adipose tissue with a #15 scalpel blade. The skin margins were undermined to an appropriate distance in all directions utilizing iris scissors. Star Wedge Flap Text: The defect edges were debeveled with a #15 scalpel blade. Given the location of the defect, shape of the defect and the proximity to free margins a star wedge flap was deemed most appropriate. Using a sterile surgical marker, an appropriate rotation flap was drawn incorporating the defect and placing the expected incisions within the relaxed skin tension lines where possible. The area thus outlined was incised deep to adipose tissue with a #15 scalpel blade. The skin margins were undermined to an appropriate distance in all directions utilizing iris scissors. Transposition Flap Text: The defect edges were debeveled with a #15 scalpel blade. Given the location of the defect and the proximity to free margins a transposition flap was deemed most appropriate. Using a sterile surgical marker, an appropriate transposition flap was drawn incorporating the defect. The area thus outlined was incised deep to adipose tissue with a #15 scalpel blade. The skin margins were undermined to an appropriate distance in all directions utilizing iris scissors. Muscle Hinge Flap Text: The defect edges were debeveled with a #15 scalpel blade. Given the size, depth and location of the defect and the proximity to free margins a muscle hinge flap was deemed most appropriate. Using a sterile surgical marker, an appropriate hinge flap was drawn incorporating the defect. The area thus outlined was incised with a #15 scalpel blade. The skin margins were undermined to an appropriate distance in all directions utilizing iris scissors. Mustarde Flap Text: The defect edges were debeveled with a #15 scalpel blade. Given the size, depth and location of the defect and the proximity to free margins a Mustarde flap was deemed most appropriate. Using a sterile surgical marker, an appropriate flap was drawn incorporating the defect. The area thus outlined was incised with a #15 scalpel blade. The skin margins were undermined to an appropriate distance in all directions utilizing iris scissors. Nasal Turnover Hinge Flap Text: The defect edges were debeveled with a #15 scalpel blade. Given the size, depth, location of the defect and the defect being full thickness a nasal turnover hinge flap was deemed most appropriate. Using a sterile surgical marker, an appropriate hinge flap was drawn incorporating the defect. The area thus outlined was incised with a #15 scalpel blade. The flap was designed to recreate the nasal mucosal lining and the alar rim. The skin margins were undermined to an appropriate distance in all directions utilizing iris scissors. Nasalis-Muscle-Based Myocutaneous Island Pedicle Flap Text: Using a #15 blade, an incision was made around the donor flap to the level of the nasalis muscle. Wide lateral undermining was then performed in both the subcutaneous plane above the nasalis muscle, and in a submuscular plane just above periosteum. This allowed the formation of a free nasalis muscle axial pedicle (based on the angular artery) which was still attached to the actual cutaneous flap, increasing its mobility and vascular viability. Hemostasis was obtained with pinpoint electrocoagulation. The flap was mobilized into position and the pivotal anchor points positioned and stabilized with buried interrupted sutures. Subcutaneous and dermal tissues were closed in a multilayered fashion with sutures. Tissue redundancies were excised, and the epidermal edges were apposed without significant tension and sutured with sutures. Orbicularis Oris Muscle Flap Text: The defect edges were debeveled with a #15 scalpel blade. Given that the defect affected the competency of the oral sphincter an orbicularis oris muscle flap was deemed most appropriate to restore this competency and normal muscle function. Using a sterile surgical marker, an appropriate flap was drawn incorporating the defect. The area thus outlined was incised with a #15 scalpel blade. Melolabial Transposition Flap Text: The defect edges were debeveled with a #15 scalpel blade. Given the location of the defect and the proximity to free margins a melolabial flap was deemed most appropriate. Using a sterile surgical marker, an appropriate melolabial transposition flap was drawn incorporating the defect. The area thus outlined was incised deep to adipose tissue with a #15 scalpel blade. The skin margins were undermined to an appropriate distance in all directions utilizing iris scissors. Rhombic Flap Text: The defect edges were debeveled with a #15 scalpel blade. Given the location of the defect and the proximity to free margins a rhombic flap was deemed most appropriate. Using a sterile surgical marker, an appropriate rhombic flap was drawn incorporating the defect. The area thus outlined was incised deep to adipose tissue with a #15 scalpel blade. The skin margins were undermined to an appropriate distance in all directions utilizing iris scissors. Rhomboid Transposition Flap Text: The defect edges were debeveled with a #15 scalpel blade. Given the location of the defect and the proximity to free margins a rhomboid transposition flap was deemed most appropriate. Using a sterile surgical marker, an appropriate rhomboid flap was drawn incorporating the defect. The area thus outlined was incised deep to adipose tissue with a #15 scalpel blade. The skin margins were undermined to an appropriate distance in all directions utilizing iris scissors. Bi-Rhombic Flap Text: The defect edges were debeveled with a #15 scalpel blade. Given the location of the defect and the proximity to free margins a bi-rhombic flap was deemed most appropriate. Using a sterile surgical marker, an appropriate rhombic flap was drawn incorporating the defect. The area thus outlined was incised deep to adipose tissue with a #15 scalpel blade. The skin margins were undermined to an appropriate distance in all directions utilizing iris scissors. Helical Rim Advancement Flap Text: The defect edges were debeveled with a #15 blade scalpel. Given the location of the defect and the proximity to free margins (helical rim) a double helical rim advancement flap was deemed most appropriate. Using a sterile surgical marker, the appropriate advancement flaps were drawn incorporating the defect and placing the expected incisions between the helical rim and antihelix where possible. The area thus outlined was incised through and through with a #15 scalpel blade. With a skin hook and iris scissors, the flaps were gently and sharply undermined and freed up. Bilateral Helical Rim Advancement Flap Text: The defect edges were debeveled with a #15 blade scalpel. Given the location of the defect and the proximity to free margins (helical rim) a bilateral helical rim advancement flap was deemed most appropriate. Using a sterile surgical marker, the appropriate advancement flaps were drawn incorporating the defect and placing the expected incisions between the helical rim and antihelix where possible. The area thus outlined was incised through and through with a #15 scalpel blade. With a skin hook and iris scissors, the flaps were gently and sharply undermined and freed up. Ear Star Wedge Flap Text: The defect edges were debeveled with a #15 blade scalpel. Given the location of the defect and the proximity to free margins (helical rim) an ear star wedge flap was deemed most appropriate. Using a sterile surgical marker, the appropriate flap was drawn incorporating the defect and placing the expected incisions between the helical rim and antihelix where possible. The area thus outlined was incised through and through with a #15 scalpel blade. Banner Transposition Flap Text: The defect edges were debeveled with a #15 scalpel blade. Given the location of the defect and the proximity to free margins a Banner transposition flap was deemed most appropriate. Using a sterile surgical marker, an appropriate flap drawn around the defect. The area thus outlined was incised deep to adipose tissue with a #15 scalpel blade. The skin margins were undermined to an appropriate distance in all directions utilizing iris scissors. Bilobed Flap Text: The defect edges were debeveled with a #15 scalpel blade. Given the location of the defect and the proximity to free margins a bilobe flap was deemed most appropriate. Using a sterile surgical marker, an appropriate bilobe flap drawn around the defect. The area thus outlined was incised deep to adipose tissue with a #15 scalpel blade. The skin margins were undermined to an appropriate distance in all directions utilizing iris scissors. Bilobed Transposition Flap Text: The defect edges were debeveled with a #15 scalpel blade. Given the location of the defect and the proximity to free margins a bilobed transposition flap was deemed most appropriate. Using a sterile surgical marker, an appropriate bilobe flap drawn around the defect. The area thus outlined was incised deep to adipose tissue with a #15 scalpel blade. The skin margins were undermined to an appropriate distance in all directions utilizing iris scissors. Trilobed Flap Text: The defect edges were debeveled with a #15 scalpel blade. Given the location of the defect and the proximity to free margins a trilobed flap was deemed most appropriate. Using a sterile surgical marker, an appropriate trilobed flap drawn around the defect. The area thus outlined was incised deep to adipose tissue with a #15 scalpel blade. The skin margins were undermined to an appropriate distance in all directions utilizing iris scissors. Dorsal Nasal Flap Text: The defect edges were debeveled with a #15 scalpel blade. Given the location of the defect and the proximity to free margins a dorsal nasal flap was deemed most appropriate. Using a sterile surgical marker, an appropriate dorsal nasal flap was drawn around the defect. The area thus outlined was incised deep to adipose tissue with a #15 scalpel blade. The skin margins were undermined to an appropriate distance in all directions utilizing iris scissors. Island Pedicle Flap Text: The defect edges were debeveled with a #15 scalpel blade. Given the location of the defect, shape of the defect and the proximity to free margins an island pedicle advancement flap was deemed most appropriate. Using a sterile surgical marker, an appropriate advancement flap was drawn incorporating the defect, outlining the appropriate donor tissue and placing the expected incisions within the relaxed skin tension lines where possible. The area thus outlined was incised deep to adipose tissue with a #15 scalpel blade. The skin margins were undermined to an appropriate distance in all directions around the primary defect and laterally outward around the island pedicle utilizing iris scissors. There was minimal undermining beneath the pedicle flap. Island Pedicle Flap With Canthal Suspension Text: The defect edges were debeveled with a #15 scalpel blade. Given the location of the defect, shape of the defect and the proximity to free margins an island pedicle advancement flap was deemed most appropriate. Using a sterile surgical marker, an appropriate advancement flap was drawn incorporating the defect, outlining the appropriate donor tissue and placing the expected incisions within the relaxed skin tension lines where possible. The area thus outlined was incised deep to adipose tissue with a #15 scalpel blade. The skin margins were undermined to an appropriate distance in all directions around the primary defect and laterally outward around the island pedicle utilizing iris scissors. There was minimal undermining beneath the pedicle flap. A suspension suture was placed in the canthal tendon to prevent tension and prevent ectropion. Alar Island Pedicle Flap Text: The defect edges were debeveled with a #15 scalpel blade. Given the location of the defect, shape of the defect and the proximity to the alar rim an island pedicle advancement flap was deemed most appropriate. Using a sterile surgical marker, an appropriate advancement flap was drawn incorporating the defect, outlining the appropriate donor tissue and placing the expected incisions within the nasal ala running parallel to the alar rim. The area thus outlined was incised with a #15 scalpel blade. The skin margins were undermined minimally to an appropriate distance in all directions around the primary defect and laterally outward around the island pedicle utilizing iris scissors. There was minimal undermining beneath the pedicle flap. Double Island Pedicle Flap Text: The defect edges were debeveled with a #15 scalpel blade. Given the location of the defect, shape of the defect and the proximity to free margins a double island pedicle advancement flap was deemed most appropriate. Using a sterile surgical marker, an appropriate advancement flap was drawn incorporating the defect, outlining the appropriate donor tissue and placing the expected incisions within the relaxed skin tension lines where possible. The area thus outlined was incised deep to adipose tissue with a #15 scalpel blade. The skin margins were undermined to an appropriate distance in all directions around the primary defect and laterally outward around the island pedicle utilizing iris scissors. There was minimal undermining beneath the pedicle flap. Island Pedicle Flap-Requiring Vessel Identification Text: The defect edges were debeveled with a #15 scalpel blade. Given the location of the defect, shape of the defect and the proximity to free margins an island pedicle advancement flap was deemed most appropriate. Using a sterile surgical marker, an appropriate advancement flap was drawn, based on the axial vessel mentioned above, incorporating the defect, outlining the appropriate donor tissue and placing the expected incisions within the relaxed skin tension lines where possible. The area thus outlined was incised deep to adipose tissue with a #15 scalpel blade. The skin margins were undermined to an appropriate distance in all directions around the primary defect and laterally outward around the island pedicle utilizing iris scissors. There was minimal undermining beneath the pedicle flap. Keystone Flap Text: The defect edges were debeveled with a #15 scalpel blade. Given the location of the defect, shape of the defect a keystone flap was deemed most appropriate. Using a sterile surgical marker, an appropriate keystone flap was drawn incorporating the defect, outlining the appropriate donor tissue and placing the expected incisions within the relaxed skin tension lines where possible. The area thus outlined was incised deep to adipose tissue with a #15 scalpel blade. The skin margins were undermined to an appropriate distance in all directions around the primary defect and laterally outward around the flap utilizing iris scissors. O-T Plasty Text: The defect edges were debeveled with a #15 scalpel blade. Given the location of the defect, shape of the defect and the proximity to free margins an O-T plasty was deemed most appropriate. Using a sterile surgical marker, an appropriate O-T plasty was drawn incorporating the defect and placing the expected incisions within the relaxed skin tension lines where possible. The area thus outlined was incised deep to adipose tissue with a #15 scalpel blade. The skin margins were undermined to an appropriate distance in all directions utilizing iris scissors. O-Z Plasty Text: The defect edges were debeveled with a #15 scalpel blade. Given the location of the defect, shape of the defect and the proximity to free margins an O-Z plasty (double transposition flap) was deemed most appropriate. Using a sterile surgical marker, the appropriate transposition flaps were drawn incorporating the defect and placing the expected incisions within the relaxed skin tension lines where possible. The area thus outlined was incised deep to adipose tissue with a #15 scalpel blade. The skin margins were undermined to an appropriate distance in all directions utilizing iris scissors. Hemostasis was achieved with electrocautery. The flaps were then transposed into place, one clockwise and the other counterclockwise, and anchored with interrupted buried subcutaneous sutures. Double O-Z Plasty Text: The defect edges were debeveled with a #15 scalpel blade. Given the location of the defect, shape of the defect and the proximity to free margins a Double O-Z plasty (double transposition flap) was deemed most appropriate. Using a sterile surgical marker, the appropriate transposition flaps were drawn incorporating the defect and placing the expected incisions within the relaxed skin tension lines where possible. The area thus outlined was incised deep to adipose tissue with a #15 scalpel blade. The skin margins were undermined to an appropriate distance in all directions utilizing iris scissors. Hemostasis was achieved with electrocautery. The flaps were then transposed into place, one clockwise and the other counterclockwise, and anchored with interrupted buried subcutaneous sutures. V-Y Plasty Text: The defect edges were debeveled with a #15 scalpel blade. Given the location of the defect, shape of the defect and the proximity to free margins an V-Y advancement flap was deemed most appropriate. Using a sterile surgical marker, an appropriate advancement flap was drawn incorporating the defect and placing the expected incisions within the relaxed skin tension lines where possible. The area thus outlined was incised deep to adipose tissue with a #15 scalpel blade. The skin margins were undermined to an appropriate distance in all directions utilizing iris scissors. H Plasty Text: Given the location of the defect, shape of the defect and the proximity to free margins a H-plasty was deemed most appropriate for repair. Using a sterile surgical marker, the appropriate advancement arms of the H-plasty were drawn incorporating the defect and placing the expected incisions within the relaxed skin tension lines where possible. The area thus outlined was incised deep to adipose tissue with a #15 scalpel blade. The skin margins were undermined to an appropriate distance in all directions utilizing iris scissors. The opposing advancement arms were then advanced into place in opposite direction and anchored with interrupted buried subcutaneous sutures. W Plasty Text: The lesion was extirpated to the level of the fat with a #15 scalpel blade. Given the location of the defect, shape of the defect and the proximity to free margins a W-plasty was deemed most appropriate for repair. Using a sterile surgical marker, the appropriate transposition arms of the W-plasty were drawn incorporating the defect and placing the expected incisions within the relaxed skin tension lines where possible. The area thus outlined was incised deep to adipose tissue with a #15 scalpel blade. The skin margins were undermined to an appropriate distance in all directions utilizing iris scissors. The opposing transposition arms were then transposed into place in opposite direction and anchored with interrupted buried subcutaneous sutures. Z Plasty Text: The lesion was extirpated to the level of the fat with a #15 scalpel blade. Given the location of the defect, shape of the defect and the proximity to free margins a Z-plasty was deemed most appropriate for repair. Using a sterile surgical marker, the appropriate transposition arms of the Z-plasty were drawn incorporating the defect and placing the expected incisions within the relaxed skin tension lines where possible. The area thus outlined was incised deep to adipose tissue with a #15 scalpel blade. The skin margins were undermined to an appropriate distance in all directions utilizing iris scissors. The opposing transposition arms were then transposed into place in opposite direction and anchored with interrupted buried subcutaneous sutures. Zygomaticofacial Flap Text: Given the location of the defect, shape of the defect and the proximity to free margins a zygomaticofacial flap was deemed most appropriate for repair. Using a sterile surgical marker, the appropriate flap was drawn incorporating the defect and placing the expected incisions within the relaxed skin tension lines where possible. The area thus outlined was incised deep to adipose tissue with a #15 scalpel blade with preservation of a vascular pedicle. The skin margins were undermined to an appropriate distance in all directions utilizing iris scissors. The flap was then placed into the defect and anchored with interrupted buried subcutaneous sutures. Cheek Interpolation Flap Text: A decision was made to reconstruct the defect utilizing an interpolation axial flap and a staged reconstruction. A telfa template was made of the defect. This telfa template was then used to outline the Cheek Interpolation flap. The donor area for the pedicle flap was then injected with anesthesia. The flap was excised through the skin and subcutaneous tissue down to the layer of the underlying musculature. The interpolation flap was carefully excised within this deep plane to maintain its blood supply. The edges of the donor site were undermined. The donor site was closed in a primary fashion. The pedicle was then rotated into position and sutured. Once the tube was sutured into place, adequate blood supply was confirmed with blanching and refill. The pedicle was then wrapped with xeroform gauze and dressed appropriately with a telfa and gauze bandage to ensure continued blood supply and protect the attached pedicle. Cheek-To-Nose Interpolation Flap Text: A decision was made to reconstruct the defect utilizing an interpolation axial flap and a staged reconstruction. A telfa template was made of the defect. This telfa template was then used to outline the Cheek-To-Nose Interpolation flap. The donor area for the pedicle flap was then injected with anesthesia. The flap was excised through the skin and subcutaneous tissue down to the layer of the underlying musculature. The interpolation flap was carefully excised within this deep plane to maintain its blood supply. The edges of the donor site were undermined. The donor site was closed in a primary fashion. The pedicle was then rotated into position and sutured. Once the tube was sutured into place, adequate blood supply was confirmed with blanching and refill. The pedicle was then wrapped with xeroform gauze and dressed appropriately with a telfa and gauze bandage to ensure continued blood supply and protect the attached pedicle. Interpolation Flap Text: A decision was made to reconstruct the defect utilizing an interpolation axial flap and a staged reconstruction. A telfa template was made of the defect. This telfa template was then used to outline the interpolation flap. The donor area for the pedicle flap was then injected with anesthesia. The flap was excised through the skin and subcutaneous tissue down to the layer of the underlying musculature. The interpolation flap was carefully excised within this deep plane to maintain its blood supply. The edges of the donor site were undermined. The donor site was closed in a primary fashion. The pedicle was then rotated into position and sutured. Once the tube was sutured into place, adequate blood supply was confirmed with blanching and refill. The pedicle was then wrapped with xeroform gauze and dressed appropriately with a telfa and gauze bandage to ensure continued blood supply and protect the attached pedicle. Melolabial Interpolation Flap Text: A decision was made to reconstruct the defect utilizing an interpolation axial flap and a staged reconstruction. A telfa template was made of the defect. This telfa template was then used to outline the melolabial interpolation flap. The donor area for the pedicle flap was then injected with anesthesia. The flap was excised through the skin and subcutaneous tissue down to the layer of the underlying musculature. The pedicle flap was carefully excised within this deep plane to maintain its blood supply. The edges of the donor site were undermined. The donor site was closed in a primary fashion. The pedicle was then rotated into position and sutured. Once the tube was sutured into place, adequate blood supply was confirmed with blanching and refill. The pedicle was then wrapped with xeroform gauze and dressed appropriately with a telfa and gauze bandage to ensure continued blood supply and protect the attached pedicle. Mastoid Interpolation Flap Text: A decision was made to reconstruct the defect utilizing an interpolation axial flap and a staged reconstruction. A telfa template was made of the defect. This telfa template was then used to outline the mastoid interpolation flap. The donor area for the pedicle flap was then injected with anesthesia. The flap was excised through the skin and subcutaneous tissue down to the layer of the underlying musculature. The pedicle flap was carefully excised within this deep plane to maintain its blood supply. The edges of the donor site were undermined. The donor site was closed in a primary fashion. The pedicle was then rotated into position and sutured. Once the tube was sutured into place, adequate blood supply was confirmed with blanching and refill. The pedicle was then wrapped with xeroform gauze and dressed appropriately with a telfa and gauze bandage to ensure continued blood supply and protect the attached pedicle. Posterior Auricular Interpolation Flap Text: A decision was made to reconstruct the defect utilizing an interpolation axial flap and a staged reconstruction. A telfa template was made of the defect. This telfa template was then used to outline the posterior auricular interpolation flap. The donor area for the pedicle flap was then injected with anesthesia. The flap was excised through the skin and subcutaneous tissue down to the layer of the underlying musculature. The pedicle flap was carefully excised within this deep plane to maintain its blood supply. The edges of the donor site were undermined. The donor site was closed in a primary fashion. The pedicle was then rotated into position and sutured. Once the tube was sutured into place, adequate blood supply was confirmed with blanching and refill. The pedicle was then wrapped with xeroform gauze and dressed appropriately with a telfa and gauze bandage to ensure continued blood supply and protect the attached pedicle. Paramedian Forehead Flap Text: A decision was made to reconstruct the defect utilizing an interpolation axial flap and a staged reconstruction. A telfa template was made of the defect. This telfa template was then used to outline the paramedian forehead pedicle flap. The donor area for the pedicle flap was then injected with anesthesia. The flap was excised through the skin and subcutaneous tissue down to the layer of the underlying musculature. The pedicle flap was carefully excised within this deep plane to maintain its blood supply. The edges of the donor site were undermined. The donor site was closed in a primary fashion. The pedicle was then rotated into position and sutured. Once the tube was sutured into place, adequate blood supply was confirmed with blanching and refill. The pedicle was then wrapped with xeroform gauze and dressed appropriately with a telfa and gauze bandage to ensure continued blood supply and protect the attached pedicle. Abbe Flap (Upper To Lower Lip) Text: The defect of the lower lip was assessed and measured. Given the location and size of the defect, an Abbe flap was deemed most appropriate. Using a sterile surgical marker, an appropriate Abbe flap was measured and drawn on the upper lip. Local anesthesia was then infiltrated. A scalpel was then used to incise the upper lip through and through the skin, vermilion, muscle and mucosa, leaving the flap pedicled on the opposite side. The flap was then rotated and transferred to the lower lip defect. The flap was then sutured into place with a three layer technique, closing the orbicularis oris muscle layer with subcutaneous buried sutures, followed by a mucosal layer and an epidermal layer. Abbe Flap (Lower To Upper Lip) Text: The defect of the upper lip was assessed and measured. Given the location and size of the defect, an Abbe flap was deemed most appropriate. Using a sterile surgical marker, an appropriate Abbe flap was measured and drawn on the lower lip. Local anesthesia was then infiltrated. A scalpel was then used to incise the upper lip through and through the skin, vermilion, muscle and mucosa, leaving the flap pedicled on the opposite side. The flap was then rotated and transferred to the lower lip defect. The flap was then sutured into place with a three layer technique, closing the orbicularis oris muscle layer with subcutaneous buried sutures, followed by a mucosal layer and an epidermal layer. Estlander Flap (Upper To Lower Lip) Text: The defect of the lower lip was assessed and measured. Given the location and size of the defect, an Estlander flap was deemed most appropriate. Using a sterile surgical marker, an appropriate Estlander flap was measured and drawn on the upper lip. Local anesthesia was then infiltrated. A scalpel was then used to incise the lateral aspect of the flap, through skin, muscle and mucosa, leaving the flap pedicled medially. The flap was then rotated and positioned to fill the lower lip defect. The flap was then sutured into place with a three layer technique, closing the orbicularis oris muscle layer with subcutaneous buried sutures, followed by a mucosal layer and an epidermal layer. Estlander Flap (Lower To Upper Lip) Text: The defect of the lower lip was assessed and measured.  Given the location and size of the defect, an Estlander flap was deemed most appropriate.  Using a sterile surgical marker, an appropriate Estlander flap was measured and drawn on the upper lip. Local anesthesia was then infiltrated. A scalpel was then used to incise the lateral aspect of the flap, through skin, muscle and mucosa, leaving the flap pedicled medially.  The flap was then rotated and positioned to fill the lower lip defect.  The flap was then sutured into place with a three layer technique, closing the orbicularis oris muscle layer with subcutaneous buried sutures, followed by a mucosal layer and an epidermal layer. Cheiloplasty (Less Than 50%) Text: A decision was made to reconstruct the defect with a  cheiloplasty. The defect was undermined extensively. Additional orbicularis oris muscle was excised with a 15 blade scalpel. The defect was converted into a full thickness wedge, of less than 50% of the vertical height of the lip, to facilite a better cosmetic result. Small vessels were then tied off with 5-0 monocyrl. The orbicularis oris, superficial fascia, adipose and dermis were then reapproximated. After the deeper layers were approximated the epidermis was reapproximated with particular care given to realign the vermilion border. Cheiloplasty (Complex) Text: A decision was made to reconstruct the defect with a  cheiloplasty. The defect was undermined extensively. Additional orbicularis oris muscle was excised with a 15 blade scalpel. The defect was converted into a full thickness wedge to facilite a better cosmetic result. Small vessels were then tied off with 5-0 monocyrl. The orbicularis oris, superficial fascia, adipose and dermis were then reapproximated. After the deeper layers were approximated the epidermis was reapproximated with particular care given to realign the vermilion border. Ear Wedge Repair Text: A wedge excision was completed by carrying down an excision through the full thickness of the ear and cartilage with an inward facing Burow's triangle. The wound was then closed in a layered fashion. Full Thickness Lip Wedge Repair (Flap) Text: Given the location of the defect and the proximity to free margins a full thickness wedge repair was deemed most appropriate. Using a sterile surgical marker, the appropriate repair was drawn incorporating the defect and placing the expected incisions perpendicular to the vermilion border. The vermilion border was also meticulously outlined to ensure appropriate reapproximation during the repair. The area thus outlined was incised through and through with a #15 scalpel blade. The muscularis and dermis were reaproximated with deep sutures following hemostasis. Care was taken to realign the vermilion border before proceeding with the superficial closure. Once the vermilion was realigned the superfical and mucosal closure was finished. Ftsg Text: The defect edges were debeveled with a #15 scalpel blade. Given the location of the defect, shape of the defect and the proximity to free margins a full thickness skin graft was deemed most appropriate. Using a sterile surgical marker, the primary defect shape was transferred to the donor site. The area thus outlined was incised deep to adipose tissue with a #15 scalpel blade. The harvested graft was then trimmed of adipose tissue until only dermis and epidermis was left. The skin margins of the secondary defect were undermined to an appropriate distance in all directions utilizing iris scissors. The secondary defect was closed with interrupted buried subcutaneous sutures. The skin edges were then re-apposed with running  sutures. The skin graft was then placed in the primary defect and oriented appropriately. Split-Thickness Skin Graft Text: The defect edges were debeveled with a #15 scalpel blade. Given the location of the defect, shape of the defect and the proximity to free margins a split thickness skin graft was deemed most appropriate. Using a sterile surgical marker, the primary defect shape was transferred to the donor site. The split thickness graft was then harvested. The skin graft was then placed in the primary defect and oriented appropriately. Burow's Graft Text: The defect edges were debeveled with a #15 scalpel blade. Given the location of the defect, shape of the defect, the proximity to free margins and the presence of a standing cone deformity a Burow's skin graft was deemed most appropriate. The standing cone was removed and this tissue was then trimmed to the shape of the primary defect. The adipose tissue was also removed until only dermis and epidermis were left. The skin margins of the secondary defect were undermined to an appropriate distance in all directions utilizing iris scissors. The secondary defect was closed with interrupted buried subcutaneous sutures. The skin edges were then re-apposed with running  sutures. The skin graft was then placed in the primary defect and oriented appropriately. Cartilage Graft Text: The defect edges were debeveled with a #15 scalpel blade. Given the location of the defect, shape of the defect, the fact the defect involved a full thickness cartilage defect a cartilage graft was deemed most appropriate. An appropriate donor site was identified, cleansed, and anesthetized. The cartilage graft was then harvested and transferred to the recipient site, oriented appropriately and then sutured into place. The secondary defect was then repaired using a primary closure. Composite Graft Text: The defect edges were debeveled with a #15 scalpel blade. Given the location of the defect, shape of the defect, the proximity to free margins and the fact the defect was full thickness a composite graft was deemed most appropriate. The defect was outline and then transferred to the donor site. A full thickness graft was then excised from the donor site. The graft was then placed in the primary defect, oriented appropriately and then sutured into place. The secondary defect was then repaired using a primary closure. Epidermal Autograft Text: The defect edges were debeveled with a #15 scalpel blade. Given the location of the defect, shape of the defect and the proximity to free margins an epidermal autograft was deemed most appropriate. Using a sterile surgical marker, the primary defect shape was transferred to the donor site. The epidermal graft was then harvested. The skin graft was then placed in the primary defect and oriented appropriately. Dermal Autograft Text: The defect edges were debeveled with a #15 scalpel blade. Given the location of the defect, shape of the defect and the proximity to free margins a dermal autograft was deemed most appropriate. Using a sterile surgical marker, the primary defect shape was transferred to the donor site. The area thus outlined was incised deep to adipose tissue with a #15 scalpel blade. The harvested graft was then trimmed of adipose and epidermal tissue until only dermis was left. The skin graft was then placed in the primary defect and oriented appropriately. Skin Substitute Text: The defect edges were debeveled with a #15 scalpel blade. Given the location of the defect, shape of the defect and the proximity to free margins a skin substitute graft was deemed most appropriate. The graft material was trimmed to fit the size of the defect. The graft was then placed in the primary defect and oriented appropriately. Skin Substitute Paste Text: The defect edges were debeveled with a #15 scalpel blade. Given the location of the defect, shape of the defect and the proximity to free margins a skin substitute micronized graft was deemed most appropriate. The entire vial contents were admixed with 0.5ccs of sterile saline, formed into a paste and then evenly spread over the entire wound bed. Skin Substitute Injection Text: The defect edges were debeveled with a #15 scalpel blade. Given the location of the defect, shape of the defect and the proximity to free margins a skin substitute micronized graft was deemed most appropriate. The entire vial contents were admixed with 3.0ccs of sterile saline and then injected subcutaneously throughout the entire wound bed. Tissue Cultured Epidermal Autograft Text: The defect edges were debeveled with a #15 scalpel blade. Given the location of the defect, shape of the defect and the proximity to free margins a tissue cultured epidermal autograft was deemed most appropriate. The graft was then trimmed to fit the size of the defect. The graft was then placed in the primary defect and oriented appropriately. Xenograft Text: The defect edges were debeveled with a #15 scalpel blade. Given the location of the defect, shape of the defect and the proximity to free margins a xenograft was deemed most appropriate. The graft was then trimmed to fit the size of the defect. The graft was then placed in the primary defect and oriented appropriately. Purse String (Simple) Text: Given the location of the defect and the characteristics of the surrounding skin a purse string closure was deemed most appropriate. Undermining was performed circumferentially around the surgical defect. A purse string suture was then placed and tightened. Purse String (Intermediate) Text: Given the location of the defect and the characteristics of the surrounding skin a purse string intermediate closure was deemed most appropriate. Undermining was performed circumferentially around the surgical defect. A purse string suture was then placed and tightened. Partial Purse String (Simple) Text: Given the location of the defect and the characteristics of the surrounding skin a simple purse string closure was deemed most appropriate. Undermining was performed circumferentially around the surgical defect. A purse string suture was then placed and tightened. Wound tension only allowed a partial closure of the circular defect. Partial Purse String (Intermediate) Text: Given the location of the defect and the characteristics of the surrounding skin an intermediate purse string closure was deemed most appropriate. Undermining was performed circumferentially around the surgical defect. A purse string suture was then placed and tightened. Wound tension only allowed a partial closure of the circular defect. Localized Dermabrasion Text: The patient was draped in routine manner. Localized dermabrasion using 3 x 17 mm wire brush was performed in routine manner to papillary dermis. This spot dermabrasion is being performed to complete skin cancer reconstruction. It also will eliminate the other sun damaged precancerous cells that are known to be part of the regional effect of a lifetime's worth of sun exposure. This localized dermabrasion is therapeutic and should not be considered cosmetic in any regard. Tarsorrhaphy Text: A tarsorrhaphy was performed using Frost sutures. Complex Repair And Flap Additional Text (Will Appearing After The Standard Complex Repair Text): The complex repair was not sufficient to completely close the primary defect. The remaining additional defect was repaired with the flap mentioned below. Complex Repair And Graft Additional Text (Will Appearing After The Standard Complex Repair Text): The complex repair was not sufficient to completely close the primary defect. The remaining additional defect was repaired with the graft mentioned below. Cheek Interpolation Flap Division And Inset Text: Division and inset of the cheek interpolation flap was performed to achieve optimal aesthetic result, restore normal anatomic appearance and avoid distortion of normal anatomy, expedite and facilitate wound healing, achieve optimal functional result and because linear closure either not possible or would produce suboptimal result. The patient was prepped and draped in the usual manner. The pedicle was infiltrated with local anesthesia. The pedicle was sectioned with a #15 blade. The pedicle was de-bulked and trimmed to match the shape of the defect. Hemostasis was achieved. The flap donor site and free margin of the flap were secured with deep buried sutures and the wound edges were re-approximated. Cheek To Nose Interpolation Flap Division And Inset Text: Division and inset of the cheek to nose interpolation flap was performed to achieve optimal aesthetic result, restore normal anatomic appearance and avoid distortion of normal anatomy, expedite and facilitate wound healing, achieve optimal functional result and because linear closure either not possible or would produce suboptimal result. The patient was prepped and draped in the usual manner. The pedicle was infiltrated with local anesthesia. The pedicle was sectioned with a #15 blade. The pedicle was de-bulked and trimmed to match the shape of the defect. Hemostasis was achieved. The flap donor site and free margin of the flap were secured with deep buried sutures and the wound edges were re-approximated. Melolabial Interpolation Flap Division And Inset Text: Division and inset of the melolabial interpolation flap was performed to achieve optimal aesthetic result, restore normal anatomic appearance and avoid distortion of normal anatomy, expedite and facilitate wound healing, achieve optimal functional result and because linear closure either not possible or would produce suboptimal result. The patient was prepped and draped in the usual manner. The pedicle was infiltrated with local anesthesia. The pedicle was sectioned with a #15 blade. The pedicle was de-bulked and trimmed to match the shape of the defect. Hemostasis was achieved. The flap donor site and free margin of the flap were secured with deep buried sutures and the wound edges were re-approximated. Mastoid Interpolation Flap Division And Inset Text: Division and inset of the mastoid interpolation flap was performed to achieve optimal aesthetic result, restore normal anatomic appearance and avoid distortion of normal anatomy, expedite and facilitate wound healing, achieve optimal functional result and because linear closure either not possible or would produce suboptimal result. The patient was prepped and draped in the usual manner. The pedicle was infiltrated with local anesthesia. The pedicle was sectioned with a #15 blade. The pedicle was de-bulked and trimmed to match the shape of the defect. Hemostasis was achieved. The flap donor site and free margin of the flap were secured with deep buried sutures and the wound edges were re-approximated. Paramedian Forehead Flap Division And Inset Text: Division and inset of the paramedian forehead flap was performed to achieve optimal aesthetic result, restore normal anatomic appearance and avoid distortion of normal anatomy, expedite and facilitate wound healing, achieve optimal functional result and because linear closure either not possible or would produce suboptimal result. The patient was prepped and draped in the usual manner. The pedicle was infiltrated with local anesthesia. The pedicle was sectioned with a #15 blade. The pedicle was de-bulked and trimmed to match the shape of the defect. Hemostasis was achieved. The flap donor site and free margin of the flap were secured with deep buried sutures and the wound edges were re-approximated. Posterior Auricular Interpolation Flap Division And Inset Text: Division and inset of the posterior auricular interpolation flap was performed to achieve optimal aesthetic result, restore normal anatomic appearance and avoid distortion of normal anatomy, expedite and facilitate wound healing, achieve optimal functional result and because linear closure either not possible or would produce suboptimal result. The patient was prepped and draped in the usual manner. The pedicle was infiltrated with local anesthesia. The pedicle was sectioned with a #15 blade. The pedicle was de-bulked and trimmed to match the shape of the defect. Hemostasis was achieved. The flap donor site and free margin of the flap were secured with deep buried sutures and the wound edges were re-approximated. Manual Repair Warning Statement: We plan on removing the manually selected variable below in favor of our much easier automatic structured text blocks found in the previous tab. We decided to do this to help make the flow better and give you the full power of structured data. Manual selection is never going to be ideal in our platform and I would encourage you to avoid using manual selection from this point on, especially since I will be sunsetting this feature. It is important that you do one of two things with the customized text below. First, you can save all of the text in a word file so you can have it for future reference. Second, transfer the text to the appropriate area in the Library tab. Lastly, if there is a flap or graft type which we do not have you need to let us know right away so I can add it in before the variable is hidden. No need to panic, we plan to give you roughly 6 months to make the change. Consent: The rationale for the repair was explained to the patient and consent was obtained. The risks, benefits and alternatives to therapy were discussed in detail. Specifically, the risks of infection, scarring, bleeding, prolonged wound healing, incomplete removal, allergy to anesthesia, nerve injury and recurrence were addressed. Prior to the procedure, the treatment site was clearly identified and confirmed by the patient. All components of Universal Protocol/PAUSE Rule completed. Post-Care Instructions: I reviewed with the patient in detail post-care instructions. Patient is not to engage in any heavy lifting, exercise, or swimming for the next 14 days. Should the patient develop any fevers, chills, bleeding, severe pain patient will contact the office immediately. Pain Refusal Text: I offered to prescribe pain medication but the patient refused to take this medication. Where Do You Want The Question To Include Opioid Counseling Located?: Case Summary Tab Information: Selecting Yes will display possible errors in your note based on the variables you have selected. This validation is only offered as a suggestion for you. PLEASE NOTE THAT THE VALIDATION TEXT WILL BE REMOVED WHEN YOU FINALIZE YOUR NOTE. IF YOU WANT TO FAX A PRELIMINARY NOTE YOU WILL NEED TO TOGGLE THIS TO 'NO' IF YOU DO NOT WANT IT IN YOUR FAXED NOTE.

## 2022-11-27 ENCOUNTER — LABORATORY RESULT (OUTPATIENT)
Age: 82
End: 2022-11-27

## 2022-11-28 ENCOUNTER — OUTPATIENT (OUTPATIENT)
Dept: OUTPATIENT SERVICES | Facility: HOSPITAL | Age: 82
LOS: 1 days | Discharge: HOME | End: 2022-11-28

## 2022-11-28 ENCOUNTER — APPOINTMENT (OUTPATIENT)
Dept: CARDIOLOGY | Facility: CLINIC | Age: 82
End: 2022-11-28

## 2022-11-28 ENCOUNTER — RESULT REVIEW (OUTPATIENT)
Age: 82
End: 2022-11-28

## 2022-11-28 VITALS
HEART RATE: 103 BPM | OXYGEN SATURATION: 95 % | BODY MASS INDEX: 37.39 KG/M2 | WEIGHT: 211 LBS | RESPIRATION RATE: 19 BRPM | HEIGHT: 63 IN | TEMPERATURE: 97.8 F

## 2022-11-28 VITALS — DIASTOLIC BLOOD PRESSURE: 73 MMHG | HEART RATE: 77 BPM | SYSTOLIC BLOOD PRESSURE: 123 MMHG

## 2022-11-28 DIAGNOSIS — Z86.39 PERSONAL HISTORY OF OTHER ENDOCRINE, NUTRITIONAL AND METABOLIC DISEASE: ICD-10-CM

## 2022-11-28 DIAGNOSIS — Z86.79 PERSONAL HISTORY OF OTHER DISEASES OF THE CIRCULATORY SYSTEM: ICD-10-CM

## 2022-11-28 DIAGNOSIS — K86.2 CYST OF PANCREAS: ICD-10-CM

## 2022-11-28 PROCEDURE — 74183 MRI ABD W/O CNTR FLWD CNTR: CPT | Mod: 26

## 2022-11-28 PROCEDURE — 99214 OFFICE O/P EST MOD 30 MIN: CPT

## 2022-11-28 RX ORDER — AMLODIPINE BESYLATE 5 MG/1
5 TABLET ORAL DAILY
Refills: 0 | Status: DISCONTINUED | COMMUNITY
Start: 2022-10-12

## 2022-11-28 RX ORDER — BISMUTH SUBSALICYLATE 262 MG/1
262 TABLET, CHEWABLE ORAL 4 TIMES DAILY
Qty: 112 | Refills: 0 | Status: DISCONTINUED | COMMUNITY
Start: 2022-10-12 | End: 2022-11-28

## 2022-11-28 RX ORDER — DOXYCYCLINE HYCLATE 100 MG/1
100 CAPSULE ORAL TWICE DAILY
Qty: 28 | Refills: 0 | Status: DISCONTINUED | COMMUNITY
Start: 2022-10-12 | End: 2022-11-28

## 2022-11-28 RX ORDER — METRONIDAZOLE 500 MG/1
500 TABLET ORAL 3 TIMES DAILY
Qty: 42 | Refills: 0 | Status: DISCONTINUED | COMMUNITY
Start: 2022-10-12 | End: 2022-11-28

## 2022-11-28 RX ORDER — ROSUVASTATIN CALCIUM 10 MG/1
10 TABLET, FILM COATED ORAL
Refills: 0 | Status: DISCONTINUED | COMMUNITY
Start: 2022-10-12 | End: 2022-11-28

## 2022-11-28 RX ORDER — PANTOPRAZOLE 40 MG/1
40 TABLET, DELAYED RELEASE ORAL
Qty: 28 | Refills: 0 | Status: DISCONTINUED | COMMUNITY
Start: 2022-10-12 | End: 2022-11-28

## 2022-11-28 NOTE — HISTORY OF PRESENT ILLNESS
[FreeTextEntry1] : 82 year old woman with aortic stenosis, atrial fibrillation, HTN and HLD presents to establish care. \par \par She was admitted to the hospital from October 6 to October 8 with shortness of breath and was found to be anemic to 6.6g. She was given 2 units PRBCs and had an endoscopy/colonoscopy that did not find any active bleeding. She saw GI as outpatient and is scheduled for a capsule endoscopy. In the interim, NOAC has been held. In the interim, we did an echocardiogram and she was found to have moderate to severe AS, which she is here to discuss today. \par \par She continue to have shortness of breath, worse with exertion. She also feels palpitations. She denies chest pain. She feels dizzy and has loss of balance but has not had syncope. She has leg swelling, but it is much improved since we started lasix. She denies orthopnea and PND. She is still having a lot of trouble sleeping.

## 2022-11-28 NOTE — ASSESSMENT
[FreeTextEntry1] : 82 year old woman with aortic stenosis, atrial fibrillation, HTN and HLD presents to establish care. \par \par 1. Aortic stenosis: moderate to severe on echocardiogram. She is symptomatic with shortness of breath. She is also currently undergoing a GI workup. Will refer to valve clinic. \par \par 2. Atrial fibrillation: symptomatic; NOAC on hold due to active bleeding. Her CHADSVASC is 4 so we will need to start as ASAP when we are cleared from GI. Otherwise, we will need to consider Watchman. \par - continue metoprolol tartrate to 50mg BID\par \par 3. HTN: BP at goal today. Her goal is <130/80 mmhg\par - Stop amlodipine due to leg swelling; check BP at home and if it is consistently above goal, call for alternative therapies. \par \par 4. HLD: Continue statin\par \par RTC in three months

## 2022-11-28 NOTE — REVIEW OF SYSTEMS
[Feeling Fatigued] : feeling fatigued [Weight Loss (___ Lbs)] : [unfilled] ~Ulb weight loss [SOB] : shortness of breath [Dyspnea on exertion] : dyspnea during exertion [Lower Ext Edema] : lower extremity edema [Abdominal Pain] : abdominal pain [Negative] : Heme/Lymph

## 2022-11-28 NOTE — PHYSICAL EXAM
[Well Developed] : well developed [Well Nourished] : well nourished [No Acute Distress] : no acute distress [Normal Conjunctiva] : normal conjunctiva [Normal Venous Pressure] : normal venous pressure [No Carotid Bruit] : no carotid bruit [No Rub] : no rub [No Gallop] : no gallop [Murmur] : murmur [Clear Lung Fields] : clear lung fields [Good Air Entry] : good air entry [No Respiratory Distress] : no respiratory distress  [Soft] : abdomen soft [Non Tender] : non-tender [No Masses/organomegaly] : no masses/organomegaly [Normal Bowel Sounds] : normal bowel sounds [Normal Gait] : normal gait [No Cyanosis] : no cyanosis [No Clubbing] : no clubbing [No Varicosities] : no varicosities [No Rash] : no rash [No Skin Lesions] : no skin lesions [Moves all extremities] : moves all extremities [No Focal Deficits] : no focal deficits [Normal Speech] : normal speech [Alert and Oriented] : alert and oriented [Normal memory] : normal memory [de-identified] : 2/6 RK TYLER border [de-identified] : irr irr [de-identified] : 2+ pitting edema b/l

## 2022-11-28 NOTE — CARDIOLOGY SUMMARY
[de-identified] : 10/26/22: atrial fibrillation in the 80s  [de-identified] : 11/2/22: \par 1. Normal left ventricular size and systolic function. The LVEF is 60%. There is mild LVH. \par 2. Biatrial dilatation. \par 3. Mild MR. \par 4. Moderate to severe AS.\par 5. Mildly elevated PASP.

## 2022-11-29 LAB
CANCER AG19-9 SERPL-ACNC: 18 U/ML
CEA SERPL-MCNC: 1 NG/ML

## 2022-11-30 ENCOUNTER — TRANSCRIPTION ENCOUNTER (OUTPATIENT)
Age: 82
End: 2022-11-30

## 2022-11-30 ENCOUNTER — OUTPATIENT (OUTPATIENT)
Dept: OUTPATIENT SERVICES | Facility: HOSPITAL | Age: 82
LOS: 1 days | Discharge: HOME | End: 2022-11-30

## 2022-11-30 VITALS
RESPIRATION RATE: 20 BRPM | SYSTOLIC BLOOD PRESSURE: 123 MMHG | DIASTOLIC BLOOD PRESSURE: 64 MMHG | HEART RATE: 87 BPM | HEIGHT: 63 IN | TEMPERATURE: 97 F | WEIGHT: 210.98 LBS

## 2022-11-30 PROCEDURE — 91110 GI TRC IMG INTRAL ESOPH-ILE: CPT | Mod: 26

## 2022-11-30 RX ORDER — ROSUVASTATIN CALCIUM 5 MG/1
1 TABLET ORAL
Qty: 0 | Refills: 0 | DISCHARGE

## 2022-11-30 NOTE — ASU DISCHARGE PLAN (ADULT/PEDIATRIC) - NS MD DC FALL RISK RISK
For information on Fall & Injury Prevention, visit: [email or web address] OR  [email or web address] OR  https://www.cdc.gov/steadi/patient.html

## 2022-11-30 NOTE — H&P PST ADULT - HISTORY OF PRESENT ILLNESS
83 yo male recently discharged from [facility] after evaluation for anemia and episodes of black stool  EGD: h Pylori gastritis  Colonoscopy: diverticulosis  is here for capsule endoscopy

## 2022-11-30 NOTE — ASU DISCHARGE PLAN (ADULT/PEDIATRIC) - ASU DC SPECIAL INSTRUCTIONSFT
-Follow up with our [facility] located at [address] [place] [address]. Phone Number: [phone]    Capsule endoscopy contains metal parts while it is in patient's body, patient should stay away from MRI equipment, keyless entry devices   -Capsule will be removed after 8 hours and returned to [facility] by [facility].

## 2022-11-30 NOTE — ASU PATIENT PROFILE, ADULT - NS PRO ABUSE SCREEN AFRAID ANYONE YN
36yo female with no sig pmh presents complaining of abdominal pain x 2 days. States that pain began suddenly and had progressively worsened. Describes pain as waxing and waning RLQ sharp pain radiating into the R flank. Denies alleviating and provoking factors. States she was seen in the hospital yesterday and was diagnosed with UTI. Only took one dose of abx but pain continued to worsen prompting visit today. Denies fevers/chills, nausea/vomiting, po intolerance, chest pain, shortness of breath, abnormal vaginal discharge, hematuria, dysuria and other associated sx.
no

## 2022-12-01 ENCOUNTER — APPOINTMENT (OUTPATIENT)
Dept: CARDIOTHORACIC SURGERY | Facility: CLINIC | Age: 82
End: 2022-12-01
Payer: MEDICARE

## 2022-12-01 ENCOUNTER — APPOINTMENT (OUTPATIENT)
Dept: CARDIOLOGY | Facility: CLINIC | Age: 82
End: 2022-12-01

## 2022-12-01 VITALS
HEIGHT: 63 IN | SYSTOLIC BLOOD PRESSURE: 120 MMHG | RESPIRATION RATE: 14 BRPM | DIASTOLIC BLOOD PRESSURE: 67 MMHG | OXYGEN SATURATION: 94 % | HEART RATE: 76 BPM | TEMPERATURE: 98 F | WEIGHT: 211 LBS | BODY MASS INDEX: 37.39 KG/M2

## 2022-12-01 LAB — H PYLORI AG STL QL: POSITIVE

## 2022-12-01 PROCEDURE — 99203 OFFICE O/P NEW LOW 30 MIN: CPT

## 2022-12-01 PROCEDURE — 99204 OFFICE O/P NEW MOD 45 MIN: CPT

## 2022-12-01 RX ORDER — LEVOFLOXACIN 500 MG/1
500 TABLET, FILM COATED ORAL
Qty: 14 | Refills: 0 | Status: DISCONTINUED | COMMUNITY
Start: 2022-12-01 | End: 2022-12-01

## 2022-12-01 RX ORDER — METRONIDAZOLE 250 MG/1
250 TABLET ORAL
Qty: 28 | Refills: 0 | Status: DISCONTINUED | COMMUNITY
Start: 2022-12-01 | End: 2022-12-01

## 2022-12-01 RX ORDER — PANTOPRAZOLE 40 MG/1
40 TABLET, DELAYED RELEASE ORAL
Qty: 28 | Refills: 0 | Status: DISCONTINUED | COMMUNITY
Start: 2022-12-01 | End: 2022-12-01

## 2022-12-01 NOTE — PHYSICAL EXAM
[General Appearance - Alert] : alert [General Appearance - In No Acute Distress] : in no acute distress [General Appearance - Well Nourished] : well nourished [Sclera] : the sclera and conjunctiva were normal [Outer Ear] : the ears and nose were normal in appearance [Neck Appearance] : the appearance of the neck was normal [Neck Cervical Mass (___cm)] : no neck mass was observed [Respiration, Rhythm And Depth] : normal respiratory rhythm and effort [Exaggerated Use Of Accessory Muscles For Inspiration] : no accessory muscle use [Normal Rate] : normal [Irregularly Irregular] : irregularly irregular [Normal S1] : normal S1 [Normal S2] : normal S2 [II] : a grade 2 [Bowel Sounds] : normal bowel sounds [Abdomen Soft] : soft [Abdomen Tenderness] : non-tender [Abnormal Walk] : normal gait [Skin Color & Pigmentation] : normal skin color and pigmentation [Skin Turgor] : normal skin turgor [] : no rash [Cranial Nerves] : cranial nerves 2-12 were intact [Deep Tendon Reflexes (DTR)] : deep tendon reflexes were 2+ and symmetric [Sensation] : the sensory exam was normal to light touch and pinprick [Oriented To Time, Place, And Person] : oriented to person, place, and time [Impaired Insight] : insight and judgment were intact [Affect] : the affect was normal

## 2022-12-05 NOTE — REVIEW OF SYSTEMS
[Fever] : no fever [Chills] : no chills [Feeling Poorly] : not feeling poorly [Earache] : no earache [Loss Of Hearing] : no hearing loss [Nosebleeds] : no nosebleeds [Heart Rate Is Slow] : the heart rate was not slow [Heart Rate Is Fast] : the heart rate was not fast [Chest Pain] : no chest pain [Abdominal Pain] : no abdominal pain [Vomiting] : no vomiting [Constipation] : no constipation [Confused] : no confusion [Convulsions] : no convulsions [Dizziness] : no dizziness

## 2022-12-05 NOTE — ASSESSMENT
[FreeTextEntry1] : Ms. ASAD MENDOSA 82 year F, former smoker, arrives  today for evaluation of their MOderate to Severe Aortic Stenosis.   Here for discussion of potential TAVR.\par \par Plan:\par Echocardiogram and recent Lab work reviewed\par Patient with SOB and fatigue, since starting Xarelto, admitted for anemia and still undergoing workup\par Advised to F/U after capsule for definite planning\par If cannot go back on NOAC will need EP Referral for Watchman Procedure\par F/U with Structural Heart team in 1 month\par F/U with Dr. Behuria\par F/U with PMD for routine \par \par I, Rima Delgado [facility]-BC, am acting as scribe for \par 
No

## 2022-12-05 NOTE — HISTORY OF PRESENT ILLNESS
[FreeTextEntry1] : Ms. ASAD MENDOSA 82 year F, former smoker, arrives  today for evaluation of their MOderate to Severe Aortic Stenosis.  Patient PMH include A fib (Xarelot held since Octoebr), HTN, hypothyroidism and anemia . Symptoms include worsening Fatigue and SOB for 6 months.  She was admitted in October 2022 for HGB 6.6+, her EGD showed H Pylori and Gastritis, Colon both negative for active bleeding.  .NYHA class III . Had capsule EGD 11/30/2022 He had an echocardiogram 11/2022 which showed moderate to severe aortic stenosis with Peak Gradient 37.8 Mean Gradient of 22.7 REMINGTON 1.04.  Here for discussion of potential TAVR.\par \par NYHA class III\par Former light smoker- Quit 30+ years ago\par Pt lives home with daughter no aide\par Lives in [place]\par Born in [place]\par Speaks [place], Daughter Britney \par \par 5 Meter walk: 1. 5.5 S  2. 6 S 3. 6S \par \par Frailty: \par Right: 15.2 14.8 15.2\par Left: 11.7 13.1 14.5 \par \par Their healthcare team includes the following\par Cardio: Dr. Behuria\par Pulmonary: Dr. Alvares\par Gastroenterologist: Dr. Lebron \par

## 2022-12-05 NOTE — END OF VISIT
[FreeTextEntry3] : AS\par AF\par \par Comorbidities as above.  Notably, undefined anemia.\par Capsule yesterday.\par \par ECHO reviewed: nL LVSF.  Mod to Sev AS (PLFLG).\par \par Discussion with patient / daughter.  First step is to define anemia / bleeding risk with anticoagulation / treatment if needed.  WATCHMAN can be considered if not good candidate for anticoagulation.  Plan for cardiac catheterization after above to further evaluate AS severity / CAD.

## 2022-12-05 NOTE — PROCEDURE
[FreeTextEntry1] : All questions and concerns were addressed with the patient. Pre-op planning was discussed with the patient, including dental clearance. Patient was educated on the risks and alternatives to surgery. STS was calculated and discussed with the pt, as well of need for MCOT post procedure and risk for PPM.

## 2022-12-06 DIAGNOSIS — D64.9 ANEMIA, UNSPECIFIED: ICD-10-CM

## 2022-12-30 NOTE — ASSESSMENT
[FreeTextEntry1] : Ms. ASAD MENDOSA 82 year F, former smoker, arrives  today for evaluation of their MOderate to Severe Aortic Stenosis.   Here for discussion of potential TAVR.\par \par Plan:\par Echocardiogram and recent Lab work reviewed\par Patient with SOB and fatigue, since starting Xarelto, admitted for anemia and still undergoing workup\par Advised to F/U after capsule for definite planning\par If cannot go back on NOAC will need EP Referral for Watchman Procedure\par F/U with Structural Heart team in 1 month\par F/U with Dr. Behuria\par F/U with PMD for routine \par \par I, Rima Delgado FNP-BC, am acting as scribe for \par \par Patient seen and examined\par History and physical as per NP note\par Agree with plan to proceed with TAVR workup

## 2022-12-30 NOTE — REVIEW OF SYSTEMS
[Negative] : Heme/Lymph [Fever] : no fever [Chills] : no chills [Feeling Poorly] : not feeling poorly [Earache] : no earache [Loss Of Hearing] : no hearing loss [Nosebleeds] : no nosebleeds [Heart Rate Is Slow] : the heart rate was not slow [Heart Rate Is Fast] : the heart rate was not fast [Chest Pain] : no chest pain [Abdominal Pain] : no abdominal pain [Vomiting] : no vomiting [Constipation] : no constipation [Confused] : no confusion [Convulsions] : no convulsions [Dizziness] : no dizziness

## 2022-12-30 NOTE — REASON FOR VISIT
[Structural Heart and Valve Disease] : structural heart and valve disease [Consultation] : a consultation visit [Family Member] : family member [FreeTextEntry1] : Aortic Stenosis

## 2023-01-05 ENCOUNTER — APPOINTMENT (OUTPATIENT)
Dept: CARDIOLOGY | Facility: CLINIC | Age: 83
End: 2023-01-05

## 2023-01-05 NOTE — ED ADULT NURSE NOTE - DRUG PRE-SCREENING (DAST -1)
[facility] Division of Hospital Medicine  Britta Raymond MD  Pager (BOBY-F, 8A-5P): [phone]  Other Times:  [phone]      SUBJECTIVE / OVERNIGHT EVENTS: Ongoing nausea, though vomiting has improved. Had a BM yesterday after the suppository. Daughter reports that the nausea is worse after taking her pain medications, pt is then reticent to take her pain meds, then she is in severe pain later    MEDICATIONS  (STANDING):  bisacodyl 5 milliGRAM(s) Oral at bedtime  cholecalciferol 400 Unit(s) Oral daily  dextrose 5%. 1000 milliLiter(s) (50 mL/Hr) IV Continuous <Continuous>  dextrose 5%. 1000 milliLiter(s) (100 mL/Hr) IV Continuous <Continuous>  dextrose 50% Injectable 25 Gram(s) IV Push once  dextrose 50% Injectable 12.5 Gram(s) IV Push once  dextrose 50% Injectable 25 Gram(s) IV Push once  enoxaparin Injectable 40 milliGRAM(s) SubCutaneous every 24 hours  gabapentin 300 milliGRAM(s) Oral two times a day  glucagon  Injectable 1 milliGRAM(s) IntraMuscular once  influenza   Vaccine 0.5 milliLiter(s) IntraMuscular once  insulin lispro (ADMELOG) corrective regimen sliding scale   SubCutaneous three times a day before meals  insulin lispro (ADMELOG) corrective regimen sliding scale   SubCutaneous at bedtime  lactated ringers. 1000 milliLiter(s) (100 mL/Hr) IV Continuous <Continuous>  LORazepam     Tablet 0.5 milliGRAM(s) Oral once  melatonin 3 milliGRAM(s) Oral at bedtime  methadone    Tablet 2.5 milliGRAM(s) Oral two times a day  montelukast 10 milliGRAM(s) Oral at bedtime  multivitamin 1 Tablet(s) Oral daily  ondansetron   Disintegrating Tablet 4 milliGRAM(s) Oral every 6 hours  pantoprazole    Tablet 40 milliGRAM(s) Oral before breakfast  polyethylene glycol 3350 17 Gram(s) Oral daily  senna 2 Tablet(s) Oral at bedtime    MEDICATIONS  (PRN):  acetaminophen     Tablet .. 650 milliGRAM(s) Oral every 6 hours PRN Temp greater or equal to 38C (100.4F), Mild Pain (1 - 3)  dextrose Oral Gel 15 Gram(s) Oral once PRN Blood Glucose LESS THAN 70 milliGRAM(s)/deciliter  oxyCODONE    IR 10 milliGRAM(s) Oral every 4 hours PRN Moderate Pain (4 - 6)  oxyCODONE    IR 15 milliGRAM(s) Oral every 4 hours PRN Severe Pain (7 - 10)  prochlorperazine   Injectable 5 milliGRAM(s) IV Push every 8 hours PRN refractory nausea      I&O's Summary    04 Jan 2023 07:01  -  05 Jan 2023 07:00  --------------------------------------------------------  IN: 1320 mL / OUT: 1450 mL / NET: -130 mL    05 Jan 2023 07:01  -  05 Jan 2023 14:03  --------------------------------------------------------  IN: 240 mL / OUT: 0 mL / NET: 240 mL        PHYSICAL EXAM:  Vital Signs Last 24 Hrs  T(C): 36.8 (05 Jan 2023 09:35), Max: 37.1 (04 Jan 2023 22:54)  T(F): 98.2 (05 Jan 2023 09:35), Max: 98.7 (04 Jan 2023 22:54)  HR: 62 (05 Jan 2023 09:35) (62 - 71)  BP: 93/55 (05 Jan 2023 09:35) (87/53 - 102/59)  BP(mean): --  RR: 18 (05 Jan 2023 09:35) (16 - 18)  SpO2: 98% (05 Jan 2023 09:35) (98% - 100%)    Parameters below as of 05 Jan 2023 09:35  Patient On (Oxygen Delivery Method): room air    CONSTITUTIONAL: NAD, cachectic   EYES: EOMI; conjunctiva and sclera clear  ENMT: Moist oral mucosa  RESPIRATORY: Normal respiratory effort; lungs are clear to auscultation bilaterally  CARDIOVASCULAR: Regular rate and rhythm, normal S1 and S2, no murmur; No lower extremity edema  ABDOMEN: Nontender to palpation, normoactive bowel sounds, no rebound/guarding; surgical scar extending from umbilicus to sternum, w/d/i  MUSCULOSKELETAL:   no clubbing or cyanosis of digits; no joint swelling or tenderness to palpation; significant decreased ROM of the LLE (cannot lift off of the bed). flexion and extension of the foot intact; full ROM of the RLE  PSYCH: A+O to person, place, and time; affect appropriate  NEUROLOGY: CN 2-12 are intact and symmetric; no gross sensory deficits   SKIN: No rashes; no palpable lesions  LABS:                        9.5    3.22  )-----------( 92       ( 05 Jan 2023 07:05 )             29.3     01-05    137  |  103  |  6<L>  ----------------------------<  128<H>  4.1   |  26  |  0.56    Ca    9.4      05 Jan 2023 07:05  Phos  4.2     01-05  Mg     1.90     01-05    TPro  7.4  /  Alb  3.7  /  TBili  0.6  /  DBili  x   /  AST  58<H>  /  ALT  50<H>  /  AlkPhos  265<H>  01-04                RADIOLOGY & ADDITIONAL TESTS:  Results Reviewed:   Imaging Personally Reviewed:  Electrocardiogram Personally Reviewed:    COORDINATION OF CARE:  Care Discussed with Consultants/Other Providers [Y/N]:  Prior or Outpatient Records Reviewed [Y/N]:   Statement Selected

## 2023-01-19 ENCOUNTER — NON-APPOINTMENT (OUTPATIENT)
Age: 83
End: 2023-01-19

## 2023-01-23 LAB
ALBUMIN SERPL ELPH-MCNC: 4.1 G/DL
ALP BLD-CCNC: 99 U/L
ALT SERPL-CCNC: 17 U/L
ANION GAP SERPL CALC-SCNC: 12 MMOL/L
AST SERPL-CCNC: 20 U/L
BILIRUB SERPL-MCNC: 0.6 MG/DL
BUN SERPL-MCNC: 17 MG/DL
CALCIUM SERPL-MCNC: 9.4 MG/DL
CHLORIDE SERPL-SCNC: 104 MMOL/L
CO2 SERPL-SCNC: 28 MMOL/L
CREAT SERPL-MCNC: 0.9 MG/DL
EGFR: 63 ML/MIN/1.73M2
GLUCOSE SERPL-MCNC: 106 MG/DL
POTASSIUM SERPL-SCNC: 5.2 MMOL/L
PROT SERPL-MCNC: 6.6 G/DL
SODIUM SERPL-SCNC: 144 MMOL/L

## 2023-01-25 LAB — H PYLORI AG STL QL: NEGATIVE

## 2023-02-14 LAB
ANION GAP SERPL CALC-SCNC: 12 MMOL/L
BASOPHILS # BLD AUTO: 0.04 K/UL
BASOPHILS NFR BLD AUTO: 0.6 %
BUN SERPL-MCNC: 14 MG/DL
CALCIUM SERPL-MCNC: 9.7 MG/DL
CHLORIDE SERPL-SCNC: 103 MMOL/L
CO2 SERPL-SCNC: 30 MMOL/L
CREAT SERPL-MCNC: 1 MG/DL
EGFR: 56 ML/MIN/1.73M2
EOSINOPHIL # BLD AUTO: 0.07 K/UL
EOSINOPHIL NFR BLD AUTO: 1.1 %
GLUCOSE SERPL-MCNC: 101 MG/DL
HCT VFR BLD CALC: 49.3 %
HGB BLD-MCNC: 14.9 G/DL
IMM GRANULOCYTES NFR BLD AUTO: 0.3 %
LYMPHOCYTES # BLD AUTO: 1.29 K/UL
LYMPHOCYTES NFR BLD AUTO: 20 %
MAN DIFF?: NORMAL
MCHC RBC-ENTMCNC: 30.2 G/DL
MCHC RBC-ENTMCNC: 30.6 PG
MCV RBC AUTO: 101.2 FL
MONOCYTES # BLD AUTO: 0.67 K/UL
MONOCYTES NFR BLD AUTO: 10.4 %
NEUTROPHILS # BLD AUTO: 4.37 K/UL
NEUTROPHILS NFR BLD AUTO: 67.6 %
PLATELET # BLD AUTO: 197 K/UL
POTASSIUM SERPL-SCNC: 5 MMOL/L
RBC # BLD: 4.87 M/UL
RBC # FLD: 14.9 %
SODIUM SERPL-SCNC: 145 MMOL/L
WBC # FLD AUTO: 6.46 K/UL

## 2023-02-22 ENCOUNTER — APPOINTMENT (OUTPATIENT)
Dept: GASTROENTEROLOGY | Facility: CLINIC | Age: 83
End: 2023-02-22

## 2023-03-02 ENCOUNTER — APPOINTMENT (OUTPATIENT)
Dept: CARDIOLOGY | Facility: CLINIC | Age: 83
End: 2023-03-02
Payer: MEDICARE

## 2023-03-02 VITALS
RESPIRATION RATE: 15 BRPM | HEART RATE: 80 BPM | OXYGEN SATURATION: 98 % | HEIGHT: 63 IN | BODY MASS INDEX: 36.68 KG/M2 | TEMPERATURE: 97.3 F | WEIGHT: 207 LBS

## 2023-03-02 VITALS — DIASTOLIC BLOOD PRESSURE: 78 MMHG | SYSTOLIC BLOOD PRESSURE: 130 MMHG

## 2023-03-02 DIAGNOSIS — Z86.79 PERSONAL HISTORY OF OTHER DISEASES OF THE CIRCULATORY SYSTEM: ICD-10-CM

## 2023-03-02 PROCEDURE — 93000 ELECTROCARDIOGRAM COMPLETE: CPT

## 2023-03-02 PROCEDURE — 99214 OFFICE O/P EST MOD 30 MIN: CPT | Mod: 25

## 2023-03-02 RX ORDER — PANTOPRAZOLE 40 MG/1
40 TABLET, DELAYED RELEASE ORAL
Qty: 28 | Refills: 0 | Status: DISCONTINUED | COMMUNITY
Start: 2022-12-01 | End: 2023-03-02

## 2023-03-02 RX ORDER — FUROSEMIDE 40 MG/1
40 TABLET ORAL DAILY
Qty: 30 | Refills: 0 | Status: DISCONTINUED | COMMUNITY
Start: 2022-10-26 | End: 2023-03-02

## 2023-03-02 NOTE — HISTORY OF PRESENT ILLNESS
[FreeTextEntry1] : 82 year old woman with aortic stenosis, atrial fibrillation, HTN and HLD presents to establish care. \par \par She was admitted to the hospital from October 6 to October 8 with shortness of breath and was found to be anemic to 6.6g. She was given 2 units PRBCs and had an endoscopy/colonoscopy that did not find any active bleeding. She has completed her GI workup now and she is supposed to follow with them in two weeks. She denies blood in her stool and urine. NOAC has still been held though.\par \par In the interim, we did an echocardiogram and she was found to have moderate to severe AS. She saw the structural team and was supposed to call them to set up a follow up appointment but has not as of yet. \par \par She continue to have shortness of breath, worse with exertion. She also feels palpitations. She denies chest pain. She feels dizzy and has loss of balance but has not had syncope. She has leg swelling. She denies orthopnea and PND. She is still having a lot of trouble sleeping.

## 2023-03-02 NOTE — ASSESSMENT
[FreeTextEntry1] : 82 year old woman with aortic stenosis, atrial fibrillation, HTN and HLD presents to establish care. \par \par 1. Aortic stenosis: Appreciate valve team consultation. She is still short of breath despite her hemoglobin being normal and has class III symptoms. \par - Repeat echocardiogram\par - She will call [facility] to set up follow up with valve clinic \par - For the shortness of breath we will try low dose torsemide for a week and check BMP\par \par 2. Atrial fibrillation: symptomatic; NOAC on hold until she sees GI. Her CHADSVASC is 4 so we will need to start as ASAP when we are cleared from GI. Otherwise, we will need to consider Watchman. \par - continue metoprolol tartrate to 50mg BID\par \par 3. HTN: BP at goal today. Her goal is <130/80 mmhg\par - Start torsemide 10mg daily\par \par RTC in three months.

## 2023-03-02 NOTE — PHYSICAL EXAM
[Well Developed] : well developed [Well Nourished] : well nourished [No Acute Distress] : no acute distress [Normal Conjunctiva] : normal conjunctiva [Normal Venous Pressure] : normal venous pressure [No Carotid Bruit] : no carotid bruit [No Rub] : no rub [No Gallop] : no gallop [Murmur] : murmur [Clear Lung Fields] : clear lung fields [Good Air Entry] : good air entry [No Respiratory Distress] : no respiratory distress  [Soft] : abdomen soft [Non Tender] : non-tender [No Masses/organomegaly] : no masses/organomegaly [Normal Bowel Sounds] : normal bowel sounds [Normal Gait] : normal gait [No Cyanosis] : no cyanosis [No Clubbing] : no clubbing [No Varicosities] : no varicosities [No Rash] : no rash [No Skin Lesions] : no skin lesions [Moves all extremities] : moves all extremities [No Focal Deficits] : no focal deficits [Normal Speech] : normal speech [Alert and Oriented] : alert and oriented [Normal memory] : normal memory [de-identified] : 2/6 RK TYLER border [de-identified] : irr irr [de-identified] : 2+ pitting edema b/l

## 2023-03-02 NOTE — CARDIOLOGY SUMMARY
Call placed to Indra Gaming to convey response to his question regarding stroke. He verbalized understanding and had no further questions.    [de-identified] : 3/2/2: atrial fibrillation in the 70-80 bpm range [de-identified] : 11/2/22: \par 1. Normal left ventricular size and systolic function. The LVEF is 60%. There is mild LVH. \par 2. Biatrial dilatation. \par 3. Mild MR. \par 4. Moderate to severe AS.\par 5. Mildly elevated PASP.

## 2023-03-14 LAB
ANION GAP SERPL CALC-SCNC: 10 MMOL/L
BUN SERPL-MCNC: 21 MG/DL
CALCIUM SERPL-MCNC: 9.6 MG/DL
CHLORIDE SERPL-SCNC: 102 MMOL/L
CO2 SERPL-SCNC: 30 MMOL/L
CREAT SERPL-MCNC: 0.9 MG/DL
EGFR: 63 ML/MIN/1.73M2
GLUCOSE SERPL-MCNC: 94 MG/DL
POTASSIUM SERPL-SCNC: 5.1 MMOL/L
SODIUM SERPL-SCNC: 142 MMOL/L

## 2023-03-15 ENCOUNTER — APPOINTMENT (OUTPATIENT)
Dept: GASTROENTEROLOGY | Facility: CLINIC | Age: 83
End: 2023-03-15
Payer: MEDICARE

## 2023-03-15 ENCOUNTER — NON-APPOINTMENT (OUTPATIENT)
Age: 83
End: 2023-03-15

## 2023-03-15 ENCOUNTER — OUTPATIENT (OUTPATIENT)
Dept: OUTPATIENT SERVICES | Facility: HOSPITAL | Age: 83
LOS: 1 days | End: 2023-03-15
Payer: MEDICARE

## 2023-03-15 VITALS
DIASTOLIC BLOOD PRESSURE: 82 MMHG | BODY MASS INDEX: 36.68 KG/M2 | HEART RATE: 90 BPM | HEIGHT: 63 IN | TEMPERATURE: 96.8 F | SYSTOLIC BLOOD PRESSURE: 144 MMHG | WEIGHT: 207 LBS | OXYGEN SATURATION: 95 %

## 2023-03-15 DIAGNOSIS — D64.9 ANEMIA, UNSPECIFIED: ICD-10-CM

## 2023-03-15 DIAGNOSIS — K64.9 UNSPECIFIED HEMORRHOIDS: ICD-10-CM

## 2023-03-15 DIAGNOSIS — B96.81 GASTRITIS, UNSPECIFIED, W/OUT BLEEDING: ICD-10-CM

## 2023-03-15 DIAGNOSIS — K29.70 GASTRITIS, UNSPECIFIED, W/OUT BLEEDING: ICD-10-CM

## 2023-03-15 DIAGNOSIS — R19.5 OTHER FECAL ABNORMALITIES: ICD-10-CM

## 2023-03-15 DIAGNOSIS — Z00.00 ENCOUNTER FOR GENERAL ADULT MEDICAL EXAMINATION WITHOUT ABNORMAL FINDINGS: ICD-10-CM

## 2023-03-15 DIAGNOSIS — K86.2 CYST OF PANCREAS: ICD-10-CM

## 2023-03-15 DIAGNOSIS — K29.70 GASTRITIS, UNSPECIFIED, WITHOUT BLEEDING: ICD-10-CM

## 2023-03-15 PROCEDURE — 99214 OFFICE O/P EST MOD 30 MIN: CPT | Mod: GC

## 2023-03-15 PROCEDURE — 99214 OFFICE O/P EST MOD 30 MIN: CPT

## 2023-03-15 NOTE — ASSESSMENT
[FreeTextEntry1] : 83 yo female is here for follow up on workup of  anemia and episodes of black stool. Patient post hospitalization, where EGD/ colonoscopy performed showing  \par \par EGD: h Pylori gastritis\par Colonoscopy: diverticulosis and hemorrhoids \par \par The patient was referred to GI clinic before for capsule endoscopy which showed non erosive gastritis, intestinal lymphangiectasia , no tumor , no GI bleed\par \par Patient with denies  GI bleed \par \par \par #Anemia : Resolved \par Endoscopic evaluation showed no active bleeding \par HGB 14.9 in 2/14/2023\par \par REC:\par Patient can be on all necessary antiplatelets and anticoagulation from GI perspective \par risks and benefits discussed with patient and daughter at bed side extensively \par watch for melena, hematemesis or hematochezia , patient advised ER visit if any GI bleed \par trend CBC \par \par \par H. pylori : s/p therapy, eradication confirmed \par \par Diverticulosis / Hemorrhoids: asymptomatic for now \par REC: \par high fiber diet \par bowel regimen \par avoid constipation \par \par Pancreatic cyst s/p MRI , 5 mm with no high risk features \par likely IPMN \par \par REC:\par MRI in 2 years if compatible with goals of care \par \par Non erosive gastritis :\par Protonix 40 mg daily\par \par Follow up in 2-3 months \par

## 2023-03-15 NOTE — HISTORY OF PRESENT ILLNESS
[FreeTextEntry1] : 81 yo female is here for follow up on workup of  anemia and episodes of black stool. Patient post hospitalization, where EGD/ colonoscopy performed showing  \par \par EGD: h Pylori gastritis\par Colonoscopy: diverticulosis and hemorrhoids \par \par The patient was referred to GI clinic before for capsule endoscopy.\par The patient had been on Xarelto for A. fib but this was discontinued when she had black stools.\par \par The patient no longer has black stools.Denies any nausea, vomiting, hematemesis , melena, hematochezia , weight loss, constipation of diarrhea \par \par \par

## 2023-03-15 NOTE — PHYSICAL EXAM
[Hearing Threshold Finger Rub Not [place]] : hearing was normal [Normal] : no respiratory distress, no accessory muscle use, normal respiratory rhythm and effort, lungs were clear to auscultation bilaterally [Heart Rate And Rhythm] : heart rate was normal and rhythm regular [Normal S1, S2] : normal S1 and S2 [Bowel Sounds] : normal bowel sounds [Abdomen Tenderness] : non-tender [No Masses] : no abdominal mass palpated [Abdomen Soft] : soft [Oriented To Time, Place, And Person] : oriented to person, place, and time

## 2023-03-23 ENCOUNTER — APPOINTMENT (OUTPATIENT)
Dept: CARDIOLOGY | Facility: CLINIC | Age: 83
End: 2023-03-23
Payer: MEDICARE

## 2023-03-23 PROCEDURE — 93306 TTE W/DOPPLER COMPLETE: CPT

## 2023-04-06 ENCOUNTER — APPOINTMENT (OUTPATIENT)
Dept: ELECTROPHYSIOLOGY | Facility: CLINIC | Age: 83
End: 2023-04-06
Payer: MEDICARE

## 2023-04-06 VITALS
WEIGHT: 200 LBS | HEART RATE: 82 BPM | SYSTOLIC BLOOD PRESSURE: 124 MMHG | DIASTOLIC BLOOD PRESSURE: 76 MMHG | BODY MASS INDEX: 35.43 KG/M2

## 2023-04-06 PROCEDURE — 93000 ELECTROCARDIOGRAM COMPLETE: CPT

## 2023-04-06 PROCEDURE — 99205 OFFICE O/P NEW HI 60 MIN: CPT | Mod: 25

## 2023-04-06 RX ORDER — TORSEMIDE 10 MG/1
10 TABLET ORAL DAILY
Qty: 30 | Refills: 3 | Status: COMPLETED | COMMUNITY
Start: 2023-03-02 | End: 2023-04-06

## 2023-04-06 RX ORDER — LEVOFLOXACIN 500 MG/1
500 TABLET, FILM COATED ORAL
Qty: 14 | Refills: 0 | Status: COMPLETED | COMMUNITY
Start: 2022-12-01 | End: 2023-04-06

## 2023-04-06 RX ORDER — PANTOPRAZOLE 40 MG/1
40 TABLET, DELAYED RELEASE ORAL DAILY
Refills: 0 | Status: COMPLETED | COMMUNITY
Start: 2022-10-12 | End: 2023-04-06

## 2023-04-06 RX ORDER — LIDOCAINE HCL AND HYDROCORTISONE ACETATE 30; 5 MG/G; MG/G
3-0.5 CREAM RECTAL; TOPICAL
Qty: 28 | Refills: 0 | Status: COMPLETED | COMMUNITY
Start: 2022-10-26 | End: 2023-04-06

## 2023-04-06 RX ORDER — APIXABAN 5 MG/1
5 TABLET, FILM COATED ORAL
Qty: 180 | Refills: 1 | Status: COMPLETED | COMMUNITY
Start: 2023-03-16 | End: 2023-04-06

## 2023-04-06 RX ORDER — METRONIDAZOLE 250 MG/1
250 TABLET ORAL
Qty: 56 | Refills: 0 | Status: COMPLETED | COMMUNITY
Start: 2022-12-01 | End: 2023-04-06

## 2023-04-08 RX ORDER — CIPROFLOXACIN HYDROCHLORIDE 500 MG/1
500 TABLET, FILM COATED ORAL
Qty: 6 | Refills: 0 | Status: DISCONTINUED | COMMUNITY
Start: 2023-03-23

## 2023-04-10 NOTE — ASSESSMENT
[FreeTextEntry1] : ## Moderate to Severe Aortic Stenosis \par ## Persistent AF \par ## Recurrent Severe GI Bleed \par \par - CHADSVASC score of 4+ (age, female, AS). Patient denies history of HTN. However, documented to have HTN. \par - Patient is at increased risk of stroke based on CHADSVASC score. She is a good candidate for Amulet implant as an alternative to anticoagulation as had complications with anticoagulation.\par \par I have discussed different treatment options with the patient including other anticoagulation medication. I have explained the risks and benefits of the procedure to the patient. I have explained to the patient the patient will have BALDEMAR in approximately 45 days. Patient will remain on aspirin and Plavix for next 6 months, and then ASA only. There is approximately 1-2% chance of any major cardiovascular complication to occur. Complications include, but are not limited to infection, bleeding, and damage to the vessels, hole in the heart, stroke, death and heart attack. The patient understands the risk and would like to proceed with the procedure. Materials were provided to the patient. Patient indicated that all of his questions were answered to his satisfaction and verbalized understanding.\par \par Referring PCP/Cardiologist  Dr. Behuria have discussed and recommended this to the patient/family and agrees with implant of watchman.\par \par I recommend to continue anticoagulation for now.\par \par \par - She is a candidate for amulet considering her recurrent bleeding. However, the timing remains to be decided. \par - She is going to see structural heart team next week and will decided timing thereafter. \par - Discussed with Dr. Spaulding from structural heart. \par - Discussed that patient needs to be on Aspirin and Plavix for 6 months after the procedure. Also discussed that there is a small possibility that patient can develop thrombus on Amulet or have a leak which will need short-term or long-term anticoagulation. \par - Patient and daughter understand and are agreeable to proceed. \par - Return after Amulet procedure. \par

## 2023-04-10 NOTE — ADDENDUM
[FreeTextEntry1] : Marilyn HERNANDEZ assisted in documentation on 04/10/2023 acting as a scribe for Dr. Jose Alan.\par

## 2023-04-10 NOTE — HISTORY OF PRESENT ILLNESS
[FreeTextEntry1] : Ms. Mcgee is an 82 year-old female with hx of moderate to severe aortic stenosis, persistent AF, HTN, dyslipidemia, GI bleed, is here for discussion of management of AF. \par \par Accompanied by daughter.\par \par Had an episode of AF. Started on anticoagulation. Had a GI bleed - endoscopy, colonoscopy, and capsule endoscopy done without any source identified. Restarted anticoagulation. Had a re-bleed. \par \par + STRONG \par \par No SOB at rest. \par \par Followed by structural team. Last appointment cancelled due to emergency on physician's part. \par \par Denies chest pain, shortness of breath, palpitation, dizziness or LOC except noted above.\par \par EKG (04/06/2023): AF @ 83 bpm\par Echo (11/2022): Normal EF, moderate to severe AS, mild AR, mild MR.\par Cardio: Dr. Behuria\par Structural heart: Dr. Spaulding \par

## 2023-04-21 ENCOUNTER — APPOINTMENT (OUTPATIENT)
Dept: ULTRASOUND IMAGING | Facility: HOSPITAL | Age: 83
End: 2023-04-21
Payer: MEDICARE

## 2023-04-21 ENCOUNTER — APPOINTMENT (OUTPATIENT)
Dept: MAMMOGRAPHY | Facility: HOSPITAL | Age: 83
End: 2023-04-21
Payer: MEDICARE

## 2023-04-21 ENCOUNTER — OUTPATIENT (OUTPATIENT)
Dept: OUTPATIENT SERVICES | Facility: HOSPITAL | Age: 83
LOS: 1 days | End: 2023-04-21
Payer: MEDICARE

## 2023-04-21 PROCEDURE — 77066 DX MAMMO INCL CAD BI: CPT | Mod: 26

## 2023-04-21 PROCEDURE — 77062 BREAST TOMOSYNTHESIS BI: CPT | Mod: 26

## 2023-04-21 PROCEDURE — 77066 DX MAMMO INCL CAD BI: CPT

## 2023-04-21 PROCEDURE — 76641 ULTRASOUND BREAST COMPLETE: CPT

## 2023-04-21 PROCEDURE — 76641 ULTRASOUND BREAST COMPLETE: CPT | Mod: 26,50

## 2023-04-21 PROCEDURE — G0279: CPT

## 2023-05-04 ENCOUNTER — APPOINTMENT (OUTPATIENT)
Dept: CARDIOLOGY | Facility: CLINIC | Age: 83
End: 2023-05-04
Payer: MEDICARE

## 2023-05-04 VITALS
DIASTOLIC BLOOD PRESSURE: 79 MMHG | SYSTOLIC BLOOD PRESSURE: 123 MMHG | OXYGEN SATURATION: 95 % | HEART RATE: 86 BPM | WEIGHT: 208 LBS | BODY MASS INDEX: 36.86 KG/M2 | HEIGHT: 63 IN | RESPIRATION RATE: 14 BRPM

## 2023-05-04 PROCEDURE — 99214 OFFICE O/P EST MOD 30 MIN: CPT

## 2023-05-05 NOTE — HISTORY OF PRESENT ILLNESS
[FreeTextEntry1] : Ms. ASAD MENDOSA 82 year F, former smoker, arrives today for evaluation of their MOderate to Severe Aortic Stenosis. Patient PMH include A fib (Xarelto held since October), HTN, hypothyroidism and anemia . Symptoms include worsening Fatigue and SOB for 6 months. She was admitted in October 2022 for HGB 6.6+, her EGD showed H Pylori and Gastritis, Colon both negative for active bleeding. .NYHA class III . Had capsule EGD 11/30/2022 He had an echocardiogram 11/2022 which showed moderate to severe aortic stenosis with Peak Gradient 37.8 Mean Gradient of 22.7 REMINGTON 1.04. Here for discussion of potential TAVR.\par \par NYHA class III\par Former light smoker- Quit 30+ years ago\par Pt lives home with daughter no aide\par Lives in [place]\par Born in [place]\par Speaks [place], Daughter Britney \par \par 5 Meter walk: 1. 5.5 S 2. 6 S 3. 6S \par \par 6 min: 400ft\par \par Frailty: \par Right: 15.2 14.8 15.2\par Left: 11.7 13.1 14.5 \par \par Their healthcare team includes the following\par Cardio: Dr. Behuria\par Pulmonary: Dr. Alvares\par Gastroenterologist: Dr. Lebron \par \par

## 2023-05-05 NOTE — ASSESSMENT
[FreeTextEntry1] : Ms. ASAD MENDOSA 82 year F here for today for follow discussion for AS. All questions and concerns were addressed with the patient. Pre-op planning was discussed with the patient, including dental clearance. Patient was educated on the risks and alternatives to surgery. STS was calculated and discussed with the pt, as well of need for MCOT post procedure and risk for PPM. \par \par Hx of anemia admitted to hospital in Oct 2022- was taking Xarelto when bleeding occurred - had EGD and capsule study done with GI however, no source of bleed found\par Pt will need watchman procedure with EP since cannot tolerated AC - after watchman/Amulet - can proceed with CATH to assess for CAD - this was explained to daughter that this can also be contributing to her symptoms of SOB \par \par Daughter needs to reschedule watchman with EP (5/9)\par Cath after procedure\par F/U after cath\par \par \par \par

## 2023-05-05 NOTE — END OF VISIT
[FreeTextEntry3] : Clinically stable.\par \par No bleeding etiology identified on endoscopy (upper / lower / capsule).\par Resumed anticoagulation / recurrent bleeding / discontinued.\par Saw Dr. Abreu.  Plan galilea LAJUDY.\par Cath to further assess AS / evaluate for CAD.

## 2023-05-09 ENCOUNTER — APPOINTMENT (OUTPATIENT)
Dept: ELECTROPHYSIOLOGY | Facility: HOSPITAL | Age: 83
End: 2023-05-09

## 2023-06-02 ENCOUNTER — NON-APPOINTMENT (OUTPATIENT)
Age: 83
End: 2023-06-02

## 2023-06-02 VITALS
TEMPERATURE: 98 F | SYSTOLIC BLOOD PRESSURE: 128 MMHG | RESPIRATION RATE: 16 BRPM | HEART RATE: 75 BPM | OXYGEN SATURATION: 98 % | DIASTOLIC BLOOD PRESSURE: 76 MMHG

## 2023-06-02 NOTE — H&P CARDIOLOGY - HISTORY OF PRESENT ILLNESS
82 year-old female with PMHx of HLD, HTN, moderate to severe aortic stenosis, persistent AFib, currently not on any AC due to h/o recurrent GI bleed, had endoscopy, colonoscopy, and capsule endoscopy done without any source identified, was restarted anticoagulation, had a re-bleed, who presented to his cardiologist with c/o STRONG..........  Denies any chest pain, dizziness, n/v, diaphoresis, orthopnea, PND, palpitations, LE edema, syncope.   Pt is now referred for R and L cardiac cath to evaluate coronary anatomy and aortic valve area prior to possible TAVR.    Pre cath note:    indication:  [ ] STEMI                [ ] NSTEMI                 [ ] Acute coronary syndrome                     [ ]Unstable Angina   [ ] high risk  [ ] intermediate risk  [ ] low risk                     [x ] Stable Angina     non-invasive testing:                       Date:                     result: [ ] high risk  [ ] intermediate risk  [ ] low risk    Anti- Anginal medications:                    [ ] not used                       [x ] used                   [ ] not used but strong indication not to use    Ejection Fraction                   [ ] <29            [ ] 30-39%   [ ] 40-49%     [ ]>50%    CHF                   [ ] active (within last 14 days on meds   [ ] Chronic (on meds but no exacerbation)    COPD                   [ ] mild (on chronic bronchodilators)  [ ] moderate (on chronic steroid therapy)      [ ] severe (indication for home O2 or PACO2 >50)    Other risk factors:                       [ ] Previous MI                     [ ] CVA/ stroke                    [ ] carotid stent/ CEA                    [ ] PVD/PAD- (arterial aneurysm, non-palpable pulses, tortuous vessel with inability to insert catheter, infra-renal dissection, renal or subclavian artery stenosis)                    [ ] diabetic                    [ ] previous CABG                    [ ] Renal Failure           Right Shaun Test:    Adjusted Cath Bleeding Risk:     Pre-hydration:     82 year-old female with PMHx of HLD, HTN, moderate to severe aortic stenosis, persistent AFib, currently not on any AC due to h/o recurrent GI bleed, had endoscopy, colonoscopy, and capsule endoscopy done without any source identified, was restarted anticoagulation, had a re-bleed, who presented to his cardiologist with c/o STRONG.  Denies any chest pain, dizziness, n/v, diaphoresis, orthopnea, PND, palpitations, LE edema, syncope.   Pt is now referred for R and L cardiac cath to evaluate coronary anatomy and aortic valve area prior to possible TAVR.    Pre cath note:    indication:  [ ] STEMI                [ ] NSTEMI                 [ ] Acute coronary syndrome                     [ ]Unstable Angina   [ ] high risk  [ ] intermediate risk  [ ] low risk                     [x ] Stable Angina     non-invasive testing:                       Date:                     result: [ ] high risk  [ ] intermediate risk  [ ] low risk    Anti- Anginal medications:                    [ ] not used                       [x ] used                   [ ] not used but strong indication not to use    Ejection Fraction                   [ ] <29            [ ] 30-39%   [ ] 40-49%     [ ]>50%    CHF                   [ ] active (within last 14 days on meds   [ ] Chronic (on meds but no exacerbation)    COPD                   [ ] mild (on chronic bronchodilators)  [ ] moderate (on chronic steroid therapy)      [ ] severe (indication for home O2 or PACO2 >50)    Other risk factors:                       [ ] Previous MI                     [ ] CVA/ stroke                    [ ] carotid stent/ CEA                    [ ] PVD/PAD- (arterial aneurysm, non-palpable pulses, tortuous vessel with inability to insert catheter, infra-renal dissection, renal or subclavian artery stenosis)                    [ ] diabetic                    [ ] previous CABG                    [ ] Renal Failure         REVIEW OF SYSTEMS:  CONSTITUTIONAL: No fever, weight loss, or fatigue  CARDIOLOGY: Patient denies chest pain, shortness of breath or syncopal episodes.   RESPIRATORY: denies shortness of breath, wheezing.   NEUROLOGICAL: NO weakness, no focal deficits to report.  GI: no BRBPR, no N,V,diarrhea.     PHYSICAL EXAM:  · CONSTITUTIONAL:	Well-developed, well nourished     · RESPIRATORY:   airway patent; breath sounds equal; good air movement; respirations non-labored; clear to auscultation bilaterally; no chest wall tenderness; no intercostal retractions; no rales,rhonchi or wheeze  · CARDIOVASCULAR	regular rate and rhythm  no rub  +murmur    · EXTREMITIES: No cyanosis, clubbing or edema  · VASCULAR: 	Equal and normal pulses (carotid, femoral, dorsalis pedis)  	      Right Shaun Test: Normal    Adjusted Cath Bleeding Risk: 1.1%    Pre-hydration: no hydration d/t mod to severe AS.

## 2023-06-06 ENCOUNTER — OUTPATIENT (OUTPATIENT)
Dept: INPATIENT UNIT | Facility: HOSPITAL | Age: 83
LOS: 1 days | Discharge: HOME | End: 2023-06-06
Payer: MEDICARE

## 2023-06-06 DIAGNOSIS — I35.0 NONRHEUMATIC AORTIC (VALVE) STENOSIS: ICD-10-CM

## 2023-06-06 DIAGNOSIS — Z90.49 ACQUIRED ABSENCE OF OTHER SPECIFIED PARTS OF DIGESTIVE TRACT: Chronic | ICD-10-CM

## 2023-06-06 LAB
ANION GAP SERPL CALC-SCNC: 9 MMOL/L — SIGNIFICANT CHANGE UP (ref 7–14)
BUN SERPL-MCNC: 21 MG/DL — HIGH (ref 10–20)
CALCIUM SERPL-MCNC: 9.3 MG/DL — SIGNIFICANT CHANGE UP (ref 8.4–10.5)
CHLORIDE SERPL-SCNC: 106 MMOL/L — SIGNIFICANT CHANGE UP (ref 98–110)
CO2 SERPL-SCNC: 28 MMOL/L — SIGNIFICANT CHANGE UP (ref 17–32)
CREAT SERPL-MCNC: 1.1 MG/DL — SIGNIFICANT CHANGE UP (ref 0.7–1.5)
EGFR: 50 ML/MIN/1.73M2 — LOW
GLUCOSE SERPL-MCNC: 103 MG/DL — HIGH (ref 70–99)
HCT VFR BLD CALC: 47.2 % — HIGH (ref 37–47)
HGB BLD-MCNC: 14.9 G/DL — SIGNIFICANT CHANGE UP (ref 12–16)
MCHC RBC-ENTMCNC: 31.6 G/DL — LOW (ref 32–37)
MCHC RBC-ENTMCNC: 32 PG — HIGH (ref 27–31)
MCV RBC AUTO: 101.5 FL — HIGH (ref 81–99)
NRBC # BLD: 0 /100 WBCS — SIGNIFICANT CHANGE UP (ref 0–0)
PLATELET # BLD AUTO: 188 K/UL — SIGNIFICANT CHANGE UP (ref 130–400)
PMV BLD: 11.3 FL — HIGH (ref 7.4–10.4)
POTASSIUM SERPL-MCNC: 4.7 MMOL/L — SIGNIFICANT CHANGE UP (ref 3.5–5)
POTASSIUM SERPL-SCNC: 4.7 MMOL/L — SIGNIFICANT CHANGE UP (ref 3.5–5)
RBC # BLD: 4.65 M/UL — SIGNIFICANT CHANGE UP (ref 4.2–5.4)
RBC # FLD: 13.4 % — SIGNIFICANT CHANGE UP (ref 11.5–14.5)
SODIUM SERPL-SCNC: 143 MMOL/L — SIGNIFICANT CHANGE UP (ref 135–146)
WBC # BLD: 6.27 K/UL — SIGNIFICANT CHANGE UP (ref 4.8–10.8)
WBC # FLD AUTO: 6.27 K/UL — SIGNIFICANT CHANGE UP (ref 4.8–10.8)

## 2023-06-06 PROCEDURE — C1887: CPT

## 2023-06-06 PROCEDURE — C1889: CPT

## 2023-06-06 PROCEDURE — 85027 COMPLETE CBC AUTOMATED: CPT

## 2023-06-06 PROCEDURE — 93460 R&L HRT ART/VENTRICLE ANGIO: CPT

## 2023-06-06 PROCEDURE — 80048 BASIC METABOLIC PNL TOTAL CA: CPT

## 2023-06-06 PROCEDURE — C1894: CPT

## 2023-06-06 PROCEDURE — 93460 R&L HRT ART/VENTRICLE ANGIO: CPT | Mod: 26

## 2023-06-06 PROCEDURE — C1760: CPT

## 2023-06-06 PROCEDURE — 36415 COLL VENOUS BLD VENIPUNCTURE: CPT

## 2023-06-06 PROCEDURE — C1769: CPT

## 2023-06-06 RX ORDER — BENZOYL PEROXIDE MICRONIZED 5.8 %
1 TOWELETTE (EA) TOPICAL
Qty: 0 | Refills: 0 | DISCHARGE

## 2023-06-06 RX ORDER — AMLODIPINE BESYLATE 2.5 MG/1
1 TABLET ORAL
Qty: 0 | Refills: 0 | DISCHARGE

## 2023-06-06 NOTE — ASU PATIENT PROFILE, ADULT - LANGUAGE ASSISTANCE NEEDED
speaks some english and daughter at bedside speaks   english/No-Patient/Caregiver offered and refused free interpretation services. speaks some english and daughter at bedside speaks   english/Yes-Patient/Caregiver accepts free interpretation services...

## 2023-06-06 NOTE — ASU PATIENT PROFILE, ADULT - NSICDXPASTMEDICALHX_GEN_ALL_CORE_FT
PAST MEDICAL HISTORY:  Atrial fibrillation     Former smoker     HTN (hypertension)     Hypercholesteremia     Hypothyroid      PAST MEDICAL HISTORY:  Aortic stenosis     Atrial fibrillation     Former smoker     HTN (hypertension)     Hypercholesteremia     Hypothyroid

## 2023-06-22 ENCOUNTER — APPOINTMENT (OUTPATIENT)
Dept: ELECTROPHYSIOLOGY | Facility: CLINIC | Age: 83
End: 2023-06-22
Payer: MEDICARE

## 2023-06-22 ENCOUNTER — APPOINTMENT (OUTPATIENT)
Dept: CARDIOLOGY | Facility: CLINIC | Age: 83
End: 2023-06-22
Payer: MEDICARE

## 2023-06-22 VITALS
TEMPERATURE: 97.5 F | DIASTOLIC BLOOD PRESSURE: 84 MMHG | RESPIRATION RATE: 15 BRPM | SYSTOLIC BLOOD PRESSURE: 129 MMHG | OXYGEN SATURATION: 97 % | HEART RATE: 74 BPM

## 2023-06-22 VITALS
HEART RATE: 86 BPM | SYSTOLIC BLOOD PRESSURE: 138 MMHG | BODY MASS INDEX: 36.32 KG/M2 | WEIGHT: 205 LBS | HEIGHT: 63 IN | RESPIRATION RATE: 16 BRPM | DIASTOLIC BLOOD PRESSURE: 84 MMHG | TEMPERATURE: 97.1 F

## 2023-06-22 VITALS — BODY MASS INDEX: 35.97 KG/M2 | HEIGHT: 63 IN | WEIGHT: 203 LBS

## 2023-06-22 DIAGNOSIS — E78.00 PURE HYPERCHOLESTEROLEMIA, UNSPECIFIED: ICD-10-CM

## 2023-06-22 DIAGNOSIS — K64.9 UNSPECIFIED HEMORRHOIDS: ICD-10-CM

## 2023-06-22 PROBLEM — I35.0 NONRHEUMATIC AORTIC (VALVE) STENOSIS: Chronic | Status: ACTIVE | Noted: 2023-06-06

## 2023-06-22 PROBLEM — Z87.891 PERSONAL HISTORY OF NICOTINE DEPENDENCE: Chronic | Status: ACTIVE | Noted: 2023-06-06

## 2023-06-22 LAB
ALBUMIN SERPL ELPH-MCNC: 4.3 G/DL
ALP BLD-CCNC: 126 U/L
ALT SERPL-CCNC: 18 U/L
ANION GAP SERPL CALC-SCNC: 16 MMOL/L
AST SERPL-CCNC: 24 U/L
BILIRUB SERPL-MCNC: 0.7 MG/DL
BUN SERPL-MCNC: 23 MG/DL
CALCIUM SERPL-MCNC: 9.1 MG/DL
CHLORIDE SERPL-SCNC: 104 MMOL/L
CO2 SERPL-SCNC: 25 MMOL/L
CREAT SERPL-MCNC: 0.9 MG/DL
EGFR: 63 ML/MIN/1.73M2
GLUCOSE SERPL-MCNC: 82 MG/DL
POTASSIUM SERPL-SCNC: 4.8 MMOL/L
PROT SERPL-MCNC: 7.2 G/DL
SODIUM SERPL-SCNC: 145 MMOL/L

## 2023-06-22 PROCEDURE — 93000 ELECTROCARDIOGRAM COMPLETE: CPT

## 2023-06-22 PROCEDURE — 99214 OFFICE O/P EST MOD 30 MIN: CPT | Mod: 25

## 2023-06-22 PROCEDURE — 99215 OFFICE O/P EST HI 40 MIN: CPT

## 2023-06-22 NOTE — ASSESSMENT
[FreeTextEntry1] : 82 year old woman with aortic stenosis, atrial fibrillation, HTN and HLD presents for follow up today. We discussed all the upcoming procedure in detail, including Watchman, TAVR. We discussed the risks and benefits of these procedures and the alternatives. She has agreed to proceed with Watchman and further, TAVR. \par \par 1. Aortic stenosis: Appreciate valve team consultation. Euvolemic on exam today but still has symptoms of shortness of breath on exertion and fatigue. \par \par 2. Atrial fibrillation: she has agreed to proceed with Watchman. Will let Dr. Alan know. \par - continue metoprolol tartrate to 50mg BID\par \par 3. HTN: BP at goal today. Her goal is <130/80 mmhg\par - Remain off anti-hypertensives for now. \par \par The secondary prevention of heart disease was discussed in detail with the patient, including adhering to a heart healthy, plant based, or Mediterranean diet, and the importance of 30 minutes of moderate intensity activity for 30 minutes, 5 times a week. All the patient's questions were answered.\par \par RTC in 3 months. \par

## 2023-06-22 NOTE — HISTORY OF PRESENT ILLNESS
[FreeTextEntry1] : Ms. Mcgee is an 82 year-old female with hx of moderate to severe aortic stenosis, persistent AF, HTN, dyslipidemia, GI bleed, is here for discussion of management of AF. \par \par Accompanied by daughter.\par \par Had an episode of AF. Started on anticoagulation. Had a GI bleed - endoscopy, colonoscopy, and capsule endoscopy done without any source identified. Restarted anticoagulation. Had a re-bleed. \par \par + STRONG \par \par No SOB at rest. \par \par Followed by structural team. Last appointment cancelled due to emergency on physician's part. \par \par 6/22: Continues to feel STRONG. Agreeable for LAAO.\par \par Denies chest pain, shortness of breath, palpitation, dizziness or LOC except noted above.\par \par EKG (04/06/2023): AF @ 83 bpm\par Echo (11/2022): Normal EF, moderate to severe AS, mild AR, mild MR.\par Cardio: Dr. Behuria\par Structural heart: Dr. Spaulding \par

## 2023-06-22 NOTE — PHYSICAL EXAM
[Well Developed] : well developed [Well Nourished] : well nourished [No Acute Distress] : no acute distress [Normal Conjunctiva] : normal conjunctiva [Normal Venous Pressure] : normal venous pressure [No Carotid Bruit] : no carotid bruit [No Rub] : no rub [No Gallop] : no gallop [Murmur] : murmur [Clear Lung Fields] : clear lung fields [Good Air Entry] : good air entry [No Respiratory Distress] : no respiratory distress  [Soft] : abdomen soft [Non Tender] : non-tender [No Masses/organomegaly] : no masses/organomegaly [Normal Bowel Sounds] : normal bowel sounds [Normal Gait] : normal gait [No Cyanosis] : no cyanosis [No Clubbing] : no clubbing [No Varicosities] : no varicosities [No Rash] : no rash [No Skin Lesions] : no skin lesions [Moves all extremities] : moves all extremities [No Focal Deficits] : no focal deficits [Normal Speech] : normal speech [Alert and Oriented] : alert and oriented [Normal memory] : normal memory [de-identified] : 2/6 RK TYLER border [de-identified] : irr irr [de-identified] : 2+ pitting edema b/l

## 2023-06-22 NOTE — CARDIOLOGY SUMMARY
[de-identified] : 6/22/23: atrial fibrillation [de-identified] : 3/23/23: 1. Normal left ventricular cavity size. The left ventricular systolic function is normal with an ejection fraction of 62 %. There is severe (grade 3) left ventricular diastolic dysfunction. There is increased LV mass and concentric hypertrophy.\par 2. Mildly enlarged right ventricular cavity size and normal systolic function.\par 3. Biatrial dilatation. \par 4. Moderate mitral regurgitation.\par 5. Severe aortic stenosis. Mild aortic regurgitation.\par 6. Moderate severe tricuspid regurgitation.\par 7. There is color flow across the septum, most likely consistent with a small PFO.\par 8. Estimated pulmonary artery systolic pressure is 52 mmHg, consistent with moderate pulmonary hypertension. \par 9. Compared to the transthoracic echocardiogram performed on 11/2/2022 the aortic valve area has decreased. There is now moderate mitral regurgitation and moderate to severe tricuspid regurgitation\par \par \par \par 11/2/22: \par 1. Normal left ventricular size and systolic function. The LVEF is 60%. There is mild LVH. \par 2. Biatrial dilatation. \par 3. Mild MR. \par 4. Moderate to severe AS.\par 5. Mildly elevated PASP.  [de-identified] : 6/6/23: Mild coronary artery disease. \par Moderately elevated PCW pressure with moderate pulmonary hypertension.\par Low cardiac output and left ventricular stroke volume. \par Findings consistent with severe aortic stenosis (paradoxical low-flow low-gradient).

## 2023-06-22 NOTE — ASSESSMENT
[FreeTextEntry1] : ## Moderate to Severe Aortic Stenosis \par ## Persistent AF \par ## Recurrent Severe GI Bleed \par \par - CHADSVASC score of 4+ (age, female, AS). Patient denies history of HTN. However, documented to have HTN. \par - Patient is at increased risk of stroke based on CHADSVASC score. She is a good candidate for Amulet implant as an alternative to anticoagulation as had complications with anticoagulation.\par \par I have discussed different treatment options with the patient including other anticoagulation medication. I have explained the risks and benefits of the procedure to the patient. I have explained to the patient the patient will have BALDEMAR in approximately 45 days. Patient will remain on aspirin and Plavix for next 6 months, and then ASA only. There is approximately 1-2% chance of any major cardiovascular complication to occur. Complications include, but are not limited to infection, bleeding, and damage to the vessels, hole in the heart, stroke, death and heart attack. The patient understands the risk and would like to proceed with the procedure. Materials were provided to the patient. Patient indicated that all of his questions were answered to his satisfaction and verbalized understanding.\par \par Referring PCP/Cardiologist  Dr. Behuria have discussed and recommended this to the patient/family and agrees with implant of watchman.\par \par - Planned for TAVR after LAAO. Discussed with Dr. Spaulding from structural heart. \par - Discussed that patient needs to be on Aspirin and Plavix for 6 months after the procedure. Also discussed that there is a small possibility that patient can develop thrombus on Amulet or have a leak which will need short-term or long-term anticoagulation. \par - Patient and daughter understand and are agreeable to proceed. \par - Return after Amulet procedure. \par

## 2023-06-22 NOTE — HISTORY OF PRESENT ILLNESS
[FreeTextEntry1] : 82 year old woman with aortic stenosis, atrial fibrillation, HTN and HLD presents for follow up today. \par \par She was admitted to the hospital from October 6 to October 8, 2022, with shortness of breath and was found to be anemic to 6.6g. She was given 2 units PRBCs and had an endoscopy/colonoscopy that did not find any active bleeding. She has completed her GI workup now and she is supposed to follow with them in two weeks. She denies blood in her stool and urine. NOAC has still been held though.\par \par In the interim, we did an echocardiogram and she was found to have moderate to severe AS. She did an extensive GI workup and no source of bleeding was found. We restarted Eliquis and she had another bleeding episode. We sent her to Dr. Alan for Watchman evaluation. Watchman was scheduled but she got scared and canceled the procedure. \par \par Today, she continue to have shortness of breath, worse with exertion. She also feels palpitations. She denies chest pain. She feels dizzy and has loss of balance but has not had syncope. She has leg swelling. She denies orthopnea and PND. She is still having a lot of trouble sleeping.

## 2023-06-27 ENCOUNTER — INPATIENT (INPATIENT)
Facility: HOSPITAL | Age: 83
LOS: 0 days | Discharge: HOME | DRG: 274 | End: 2023-06-28
Attending: STUDENT IN AN ORGANIZED HEALTH CARE EDUCATION/TRAINING PROGRAM | Admitting: STUDENT IN AN ORGANIZED HEALTH CARE EDUCATION/TRAINING PROGRAM
Payer: MEDICARE

## 2023-06-27 ENCOUNTER — TRANSCRIPTION ENCOUNTER (OUTPATIENT)
Age: 83
End: 2023-06-27

## 2023-06-27 ENCOUNTER — APPOINTMENT (OUTPATIENT)
Dept: ELECTROPHYSIOLOGY | Facility: HOSPITAL | Age: 83
End: 2023-06-27

## 2023-06-27 VITALS
HEART RATE: 101 BPM | OXYGEN SATURATION: 95 % | SYSTOLIC BLOOD PRESSURE: 150 MMHG | DIASTOLIC BLOOD PRESSURE: 78 MMHG | RESPIRATION RATE: 18 BRPM

## 2023-06-27 DIAGNOSIS — I48.19 OTHER PERSISTENT ATRIAL FIBRILLATION: ICD-10-CM

## 2023-06-27 DIAGNOSIS — Z00.6 ENCOUNTER FOR EXAMINATION FOR NORMAL COMPARISON AND CONTROL IN CLINICAL RESEARCH PROGRAM: ICD-10-CM

## 2023-06-27 DIAGNOSIS — Z90.49 ACQUIRED ABSENCE OF OTHER SPECIFIED PARTS OF DIGESTIVE TRACT: Chronic | ICD-10-CM

## 2023-06-27 LAB
ALLERGY+IMMUNOLOGY DIAG STUDY NOTE: SIGNIFICANT CHANGE UP
BLD GP AB SCN SERPL QL: SIGNIFICANT CHANGE UP
DIR ANTIGLOB POLYSPECIFIC INTERPRETATION: SIGNIFICANT CHANGE UP

## 2023-06-27 PROCEDURE — 86077 PHYS BLOOD BANK SERV XMATCH: CPT

## 2023-06-27 PROCEDURE — 71046 X-RAY EXAM CHEST 2 VIEWS: CPT

## 2023-06-27 PROCEDURE — 71045 X-RAY EXAM CHEST 1 VIEW: CPT | Mod: 26

## 2023-06-27 PROCEDURE — 93005 ELECTROCARDIOGRAM TRACING: CPT

## 2023-06-27 PROCEDURE — 33340 PERQ CLSR TCAT L ATR APNDGE: CPT | Mod: Q0

## 2023-06-27 PROCEDURE — 71045 X-RAY EXAM CHEST 1 VIEW: CPT

## 2023-06-27 PROCEDURE — 33340 PERQ CLSR TCAT L ATR APNDGE: CPT | Mod: KX

## 2023-06-27 PROCEDURE — 93306 TTE W/DOPPLER COMPLETE: CPT

## 2023-06-27 RX ORDER — ASPIRIN/CALCIUM CARB/MAGNESIUM 324 MG
325 TABLET ORAL DAILY
Refills: 0 | Status: DISCONTINUED | OUTPATIENT
Start: 2023-06-27 | End: 2023-06-28

## 2023-06-27 RX ORDER — KETAMINE HYDROCHLORIDE 100 MG/ML
50 INJECTION INTRAMUSCULAR; INTRAVENOUS ONCE
Refills: 0 | Status: DISCONTINUED | OUTPATIENT
Start: 2023-06-27 | End: 2023-06-28

## 2023-06-27 RX ORDER — CLOPIDOGREL BISULFATE 75 MG/1
75 TABLET, FILM COATED ORAL DAILY
Refills: 0 | Status: DISCONTINUED | OUTPATIENT
Start: 2023-06-27 | End: 2023-06-28

## 2023-06-27 RX ORDER — LEVOTHYROXINE SODIUM 125 MCG
112 TABLET ORAL DAILY
Refills: 0 | Status: DISCONTINUED | OUTPATIENT
Start: 2023-06-27 | End: 2023-06-28

## 2023-06-27 RX ORDER — LEVOTHYROXINE SODIUM 125 MCG
1 TABLET ORAL
Qty: 0 | Refills: 0 | DISCHARGE

## 2023-06-27 RX ORDER — VANCOMYCIN HCL 1 G
1000 VIAL (EA) INTRAVENOUS EVERY 12 HOURS
Refills: 0 | Status: COMPLETED | OUTPATIENT
Start: 2023-06-27 | End: 2023-06-28

## 2023-06-27 RX ORDER — LANOLIN ALCOHOL/MO/W.PET/CERES
3 CREAM (GRAM) TOPICAL AT BEDTIME
Refills: 0 | Status: DISCONTINUED | OUTPATIENT
Start: 2023-06-27 | End: 2023-06-28

## 2023-06-27 RX ORDER — ACETAMINOPHEN 500 MG
650 TABLET ORAL EVERY 6 HOURS
Refills: 0 | Status: DISCONTINUED | OUTPATIENT
Start: 2023-06-27 | End: 2023-06-28

## 2023-06-27 RX ORDER — VANCOMYCIN HCL 1 G
1000 VIAL (EA) INTRAVENOUS ONCE
Refills: 0 | Status: DISCONTINUED | OUTPATIENT
Start: 2023-06-27 | End: 2023-06-28

## 2023-06-27 RX ORDER — METOPROLOL TARTRATE 50 MG
50 TABLET ORAL DAILY
Refills: 0 | Status: DISCONTINUED | OUTPATIENT
Start: 2023-06-27 | End: 2023-06-28

## 2023-06-27 RX ADMIN — Medication 325 MILLIGRAM(S): at 13:49

## 2023-06-27 RX ADMIN — Medication 250 MILLIGRAM(S): at 17:49

## 2023-06-27 RX ADMIN — Medication 650 MILLIGRAM(S): at 15:21

## 2023-06-27 RX ADMIN — CLOPIDOGREL BISULFATE 75 MILLIGRAM(S): 75 TABLET, FILM COATED ORAL at 13:49

## 2023-06-27 NOTE — DISCHARGE NOTE PROVIDER - ATTENDING DISCHARGE PHYSICAL EXAMINATION:
I saw and evaluated the patient the day of discharge.  They are s/p LAAO with Amulet.  On the day of discharge they are hemodynamically stable, comfortable appearing, and without complaints.  All questions and concerns were addressed.  Patient is stable for discharge and close follow up with Dr. Alan.

## 2023-06-27 NOTE — PATIENT PROFILE ADULT - FALL HARM RISK - HARM RISK INTERVENTIONS

## 2023-06-27 NOTE — H&P CARDIOLOGY - HISTORY OF PRESENT ILLNESS
82 year-old female with PMHx of HLD, HTN, moderate to severe aortic stenosis, persistent AFib, currently not on any AC due to h/o recurrent GI bleed, had endoscopy, colonoscopy, and capsule endoscopy done without any source identified, was restarted anticoagulation, had a re-bleed is here for  left atrial appendage closure device Implant..  Denies any chest pain, dizziness, n/v, diaphoresis, orthopnea, PND, palpitations, LE edema, syncope.

## 2023-06-27 NOTE — H&P CARDIOLOGY - PSYCHIATRIC
PRENATAL INTAKE SUMMARY    Ms. Alexus Lopez presents today for her first prenatal visit. OB History      Para Term  AB TAB SAB Ectopic Multiple Living    4 1 1  2  2   2        I have reviewed the patient's medical, obstetrical, social, and family histories, medications, and available lab results. Subjective:   She has no unusual complaints    Objective:   Initial Physical Exam (New OB)    GENERAL APPEARANCE: alert, well appearing, in no apparent distress  HEAD: normocephalic, atraumatic  MOUTH: mucous membranes moist, pharynx normal without lesions  THYROID: no thyromegaly or masses present  BREASTS: no masses noted, no significant tenderness, no palpable axillary nodes, no skin changes, not examined  LUNGS: clear to auscultation, no wheezes, rales or rhonchi, symmetric air entry  HEART: regular rate and rhythm, no murmurs  ABDOMEN: soft, nontender, nondistended, no abnormal masses, no epigastric pain  EXTREMITIES: no redness or tenderness in the calves or thighs, no edema  SKIN: normal coloration and turgor, no rashes  LYMPH NODES: no adenopathy palpable  NEUROLOGIC: alert, oriented, normal speech, no focal findings or movement disorder noted    PELVIC EXAM: EXTERNAL GENITALIA: normal appearing vulva with no masses, tenderness or lesions  VAGINA: no abnormal discharge or lesions  CERVIX: no lesions or cervical motion tenderness  UTERUS: gravid and consistent with 10 weeks  ADNEXA: no masses palpable and nontender    Assessment/Plan:   Normal pregnancy    Routine Prenatal care    ICD-10-CM ICD-9-CM    1.  Pregnancy with history of miscarriage, first trimester O09.291 V23.2 AMB POC URINALYSIS DIP STICK AUTO W/O MICRO      HEP B SURFACE AG      ABO GROUPING AND RHO(D) TYPING      ANTIBODY SCREEN      T PALLIDUM AB      VZV AB, IGG      CBC W/O DIFF      CULTURE, URINE      HEMOGLOBIN FRACTIONATION      HIV 1/2 AG/AB, 4TH GENERATION,W RFLX CONFIRM      RUBELLA AB, IGG      PAP IG, APTIMA HPV AND RFX
16/18,45 ([id number])      CHLAMYDIA / GC AMPLIFICATION      CANCELED: AMB POC URINE PREGNANCY TEST, VISUAL COLOR COMPARISON   2. Missed menses N92.6 626.4 AMB POC URINALYSIS DIP STICK AUTO W/O MICRO      HEP B SURFACE AG      ABO GROUPING AND RHO(D) TYPING      ANTIBODY SCREEN      T PALLIDUM AB      VZV AB, IGG      CBC W/O DIFF      CULTURE, URINE      HEMOGLOBIN FRACTIONATION      HIV 1/2 AG/AB, 4TH GENERATION,W RFLX CONFIRM      RUBELLA AB, IGG      PAP IG, APTIMA HPV AND RFX 16/18,45 ([id number])      CHLAMYDIA / GC AMPLIFICATION      CANCELED: AMB POC URINE PREGNANCY TEST, VISUAL COLOR COMPARISON   3. Need for influenza vaccination Z23 V04.81 INFLUENZA VIRUS VAC QUAD,SPLIT,PRESV FREE SYRINGE 3/> YRS IM      SC IMMUNIZ ADMIN,1 SINGLE/COMB VAC/TOXOID   4. Encounter for supervision of other normal pregnancy in first trimester Z34.81     5.  Encounter for supervision of other normal pregnancy Z34.80
DISPLAY PLAN FREE TEXT
DISPLAY PLAN FREE TEXT
not examined

## 2023-06-27 NOTE — DISCHARGE NOTE PROVIDER - NSDCCPTREATMENT_GEN_ALL_CORE_FT
PRINCIPAL PROCEDURE  Procedure: Insertion of atrial appendage occlusion device  Findings and Treatment: - Please take Keflex 500 mg (Bactrim / doxycycline 100 mg) twice daily for 5 days. *********  - Please start taking aspirin 81 mg and Plavix 75mg daily.   - You may shower today.  - Do not drive or operate heavy machinery for 3 days.  - Do not submerge in water (example: baths, swimming) for 1 week.  - No squatting, exertional activities, or lifting anything > 10 lbs for 1 week.  - Any sudden swelling, redness, fever, discharge, or severe pain, call the Electrophysiology Office at [phone].     PRINCIPAL PROCEDURE  Procedure: Insertion of atrial appendage occlusion device  Findings and Treatment:   - Please start taking aspirin 81 mg and Plavix 75mg daily.   - You may shower today.  - Do not drive or operate heavy machinery for 3 days.  - Do not submerge in water (example: baths, swimming) for 1 week.  - No squatting, exertional activities, or lifting anything > 10 lbs for 1 week.  - Any sudden swelling, redness, fever, discharge, or severe pain, call the Electrophysiology Office at [phone].  - Followup with Dr. Alan on 7/27 @ 3:30pm @ [address]

## 2023-06-27 NOTE — DISCHARGE NOTE PROVIDER - NSDCFUSCHEDAPPT_GEN_ALL_CORE_FT
Toya Maloney  [facility] PreAdmits  Scheduled Appointment: 06/28/2023    [facility]  GASTRO  [address]  Scheduled Appointment: 06/28/2023    Behuria, Supreeti  [facility]ician [facility]  CARDIOLOGY [address]  Scheduled Appointment: 09/21/2023     [facility] Maloney  [facility]  Scheduled Appointment: 06/28/2023    [facility]ician [facility]  [address]STRO  [address]  Scheduled Appointment: 06/28/2023    Jose Alan  [facility] Physician [facility]  ELECTROPH [address]  Scheduled Appointment: 07/27/2023    Behuria, Supreeti  [facility]ician [facility]  CARDIOLOGY [address]  Scheduled Appointment: 09/21/2023     Jose Alan  [facility]  ELECTROPH [address]  Scheduled Appointment: 07/27/2023    Behuria, Supreeti  [facility]  CARDIOLOGY [address]  Scheduled Appointment: 09/21/2023

## 2023-06-27 NOTE — DISCHARGE NOTE PROVIDER - NSDCMRMEDTOKEN_GEN_ALL_CORE_FT
levothyroxine 112 mcg (0.112 mg) oral capsule: 1 orally once a day  metoprolol succinate 50 mg oral tablet, extended release: 1 tab(s) orally once a day   aspirin 81 mg oral tablet: 1 tab(s) orally once a day  clopidogrel 75 mg oral tablet: 1 tab(s) orally once a day  levothyroxine 112 mcg (0.112 mg) oral capsule: 1 orally once a day  metoprolol succinate 50 mg oral tablet, extended release: 1 tab(s) orally once a day   aspirin 81 mg oral tablet: 1 tab(s) orally once a day  clopidogrel 75 mg oral tablet: 1 tab(s) orally once a day  levothyroxine 112 mcg (0.112 mg) oral capsule: 1 orally once a day  metoprolol tartrate 50 mg oral tablet: 1 tab(s) orally 2 times a day

## 2023-06-27 NOTE — DISCHARGE NOTE PROVIDER - HOSPITAL COURSE
82 year-old female with PMHx of HLD, HTN, moderate to severe aortic stenosis, persistent AFib, currently not on any AC due to h/o recurrent GI bleed, had endoscopy, colonoscopy, and capsule endoscopy done without any source identified, was restarted anticoagulation, had a re-bleed and presented for left atrial appendage closure device Implant (Amulet).    On 6/27/23, patient underwent successful Watchman implantation. Patient was monitored overnight. On POD 1, patient remains HD stable with no complaints. Examination of B/L common femoral venous access sites reveal a Clear and dry area with no hematoma or erythema. Patient will start ASA and Plavix x6 months and plan to for BALDEMAR To evaluate appendage velocities as an outpatient?????  Patient will continue other home meds including anti-arrhythmic drugs. Patient is being DC home in stable condition.   82 year-old female with PMHx of HLD, HTN, moderate to severe aortic stenosis, persistent AFib, currently not on any AC due to h/o recurrent GI bleed, had endoscopy, colonoscopy, and capsule endoscopy done without any source identified, was restarted anticoagulation, had a re-bleed and presented for left atrial appendage closure device Implant (Amulet).    On 6/27/23, patient underwent successful Amulet implantation. Patient was monitored overnight. On POD 1, patient remains HD stable with no complaints. Examination of B/L common femoral venous access sites reveal a Clear and dry area with no hematoma or erythema. Patient will start ASA and Plavix x6 months and plan to for BALDEMAR To evaluate appendage velocities as an outpatient?????  Patient will continue other home meds including anti-arrhythmic drugs. Patient is being DC home in stable condition.   82 year-old female with PMHx of HLD, HTN, moderate to severe aortic stenosis, persistent AFib, currently not on any AC due to h/o recurrent GI bleed, had endoscopy, colonoscopy, and capsule endoscopy done without any source identified, was restarted anticoagulation, had a re-bleed and presented for left atrial appendage closure device Implant (Amulet).    On 6/27/23, patient underwent successful Amulet implantation. Patient was monitored overnight. On POD 1, patient remains HD stable with no complaints. Examination of B/L common femoral venous access sites reveal a Clear and dry area with no hematoma or erythema. Patient will start ASA and Plavix x6 months, Limited echo****  Patient will continue other home meds including anti-arrhythmic drugs. Patient is being DC home in stable condition.   82 year-old female with PMHx of HLD, HTN, moderate to severe aortic stenosis, persistent AFib, currently not on any AC due to h/o recurrent GI bleed, had endoscopy, colonoscopy, and capsule endoscopy done without any source identified, was restarted anticoagulation, had a re-bleed and presented for left atrial appendage closure device Implant (Amulet).    On 6/27/23, patient underwent successful Amulet implantation. Patient was monitored overnight. On POD 1, patient remains HD stable with no complaints. Examination of B/L common femoral venous access sites reveal a Clear and dry area with no hematoma or erythema. Patient will start ASA and Plavix x6 months, Limited echo for Pleural effusion negative.   Patient will continue other home meds including anti-arrhythmic drugs. Patient is being DC home in stable condition.

## 2023-06-27 NOTE — DISCHARGE NOTE PROVIDER - NSCORESITESY/N_GEN_A_CORE_RD
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 IL-36Ra blocks the pro- inflammatory cytokine IL-36. This variant has been reported in the homozygous and compound heterozygous state in association with generalized pustular psoriasis and other overlapping syndromes. 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      She has a history of recurrent episodes of arthralgias, pelvic pain, fever and skin eruption.  She has been treated with colchicine and then Anakinra but had breakthrough.  She was started on methotrexate and Humira.      4/6/2022 Office Visit:  Autoinflammatory syndrome with periodic fever (CMS/HCC)  Influenza A  Psoriatic arthritis (CMS/HCC)  Mild intermittent asthma without complication - Still with cough from Influenza A but lungs are clear. Had flare of rash and pain from autoinflammatory syndrome - now calming down.   Generally stable on colchicine, humira and methotrexate.      5/15/2022 E-advice:  Patient contacted the clinic and stated she had a flare-up with painful rash to her right breast.      8/8/2022 Dr. Quentin Celis Dermatology Visit [facility] [place]  Psoriasis: \"Most recently, patient has improvement in her scalp, nail, and flexural  psoriasis since starting adalimumab. She is mainly managing flares with topical medications. It is likely there is some overlap between psoriasis and her periodic fever syndrome. The \"pustular eczema\" she describes may very well be pustular psoriasis, which is also seen in deficiency of IL-36 receptor antagonist. Nail disease seems to be the most troubling aspect today, but overall her psoriasis is well controlled. Dr. Chaudhary will visit with patient to specifically address psoriatic arthritis symptoms and discuss methotrexate. As she has moderate control of psoriasis symptoms today, we will continue with the current plan:  - Continue adalimumab 40 mg q2 weeks  - Continue methotrexate 15 mg weekly with daily 1 mg folate  - Continue clobetasol solution to the scalp as needed  - Continue infrequent use of triamcinolone 0.1% cream to body. Discussed side effects and proper use. I asked patient to alert us if she is using triamcinolone daily for >1-2 weeks in flexural skin.\"  Rosacea: \"Her vision changes in the past are more likely related to a optic neuritis from psoriasis and/or the periodic fever syndrome than her ocular rosacea. The hyperkeratosis on the forehead is peculiar and I wonder if represents an overlap with her periodic fever (due to ?keratinocyte proliferation being a downstream effect of IL-36 over activation?) We will look further into the literature on this and also present patient case at department grand rounds. She failed doxycycline and prefers to avoid re-trial. We discussed bactrim, which patient has taken in the past (and tolerated) for UTIs. Oral retinoids may be useful in the future if her keratotic plaques on the face progress. Ivermectin cream likely would not be approved due to lack of papular component. Other options to discuss in future: [employer] triple rosacea cream, low dose isotretinoin, azelaic acid, sulfacetamide lotion, PDL.  - Start bactrim -160 mg BID for 1 month, then  decrease to 1 pill daily until follow up. Discussed rare side effects of allergy/SJS.  - Start metronidazole 0.75% gel twice a day on affected areas   - Continue tretinoin 0.025% cream nightly\"  Portions of this note are brought forward from Dr. Celis's note; reviewed and edited by me as appropriate.         Patient also evaluated by Dr. Caty Chaudhary of rheumatology with [facility]. Plan to continue current regimen as there was no evidence of active inflammatory arthritis. Continue methotrexate, adalimumab, and colchicine.      9/25/22 E-Advice:  Patient reported having flu-like symptoms with fever up to 101.5 degrees. Patient using 10 mg Oxycodone every 4-6 hours. Her joint pain was 9/10 in severity and she presented to ED. Had a positive COVID test at that time and given Paxlovid. Was given Tylenol and Toradol, patient noted ED physicians would not provide her with more opioids. Reported improvement in pain as of 9/29 but had cough and shortness of breath. Fevers resolved, no rashes.       9/29/22 Dr. Harrison of Rheumatology Telemedicine:  Patient reported having hair loss on methotrexate, Dr. Harrison noted she could decrease to 6 tablets weekly rather than current 7. Could increase colchicine to 0.6 mg BID for flare-ups of periodic fever syndrome. Patient is holding Humira and methotrexate until 2 weeks after COVID symptoms resolve.     Today's Visit:  Asthma  She states her asthma is somewhat controlled. She has not had a cough. She has shortness of breath. She has no wheezing. She has had exercise-related symptoms. She has no nocturnal symptoms. She has been using the rescue inhaler 1 times per week or less. She has not required prednisone since the last visit. She has required emergency room visits since the last visit.    When patient first developed COVID in September, she developed shortness of breath and has had difficulty controlling asthma symptoms since.    Improvement of breathing with albuterol.      Rash:  Patient reports she has been having rash develop on her bilateral breasts. At that time she was evaluated by rheumatology and colchicine dose was doubled.   No rashes currently.    Patient reports she has been having trouble with hypoglycemia every day, sometimes multiple times/day. Reports lowest level of 50. Has endocrinology follow-up in December. Recent HGB check normal per patient.    Patient also reports that she developed mouth numbness after eating whey protein. No itching or hives. Patient avoids dairy due to lactose intolerance.   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     Current Outpatient Medications   Medication Sig Dispense Refill   • clobetasol (TEMOVATE) 0.05 % topical solution Apply topically 2 times daily. 50 mL 5   • triamcinolone (ARISTOCORT) 0.1 % cream Apply topically 2 times daily as needed (rash). 60 g 5   • albuterol 108 (90 Base) MCG/ACT inhaler Inhale 2 puffs into the lungs every 4 hours as needed for Shortness of Breath or Wheezing. 1 each 2   • budesonide-formoterol (Symbicort) 160-4.5 MCG/ACT inhaler Inhale 2 puffs into the lungs in the morning and 2 puffs in the evening. 1 each 3   • OneTouch Ultra test strip TEST BLOOD SUGAR ONCE DAILY WHEN SYMPTOMS OCCUR 100 each 0   • TEMazepam (RESTORIL) 15 MG capsule 1-2 at bedtime for sleep as needed 60 capsule 5   • clonazePAM (KlonoPIN) 0.5 MG tablet Take 1 tablet by mouth 2 times daily as needed for Anxiety. 60 tablet 5   • venlafaxine XR (EFFEXOR XR) 37.5 MG 24 hr capsule Take 2 capsules by mouth daily. 60 capsule 5   • blood glucose meter Test blood sugar 1 times daily when symptoms occur. Diagnosis: E16.1. Meter: as covered by insurance 1 kit 0   • Lancets Misc Test blood sugar 1 times daily when symptoms occur. Diagnosis: E16.1. Meter: as covered by insurance 100 each 0   • fluconazole (DIFLUCAN) 150 MG tablet Take 1 tablet orally every 48-72 hours as needed.  Thereafter take 1 tablet every 7 days. 30 tablet 3    • levonorgestrel-eth estradiol & eth estradiol (Simpesse) 0.15-0.03 &0.01 MG tablet Take 1 tablet by mouth daily. 91 tablet 3   • metroNIDAZOLE (MetroGEL) 0.75 % gel Apply topically twice daily to face for rosacea     • oxyCODONE HCl 10 MG immediate release tablet Take 1 tablet by mouth every 4 hours as needed (pain). 7 tablet 0   • tretinoin (RETIN-A) 0.025 % cream Apply topically nightly. 20 g 3   • brimonidine (Mirvaso) 0.33 % gel Apply 1 application topically every morning. 30 g 3   • cyclobenzaprine (FLEXERIL) 10 MG tablet Take 10 mg by mouth 3 times daily as needed for Muscle spasms.     • dicyclomine (BENTYL) 20 MG tablet TAKE 1 TABLET BY MOUTH FOUR TIMES A DAY AS NEEDED     • rizatriptan (Maxalt-MLT) 5 MG disintegrating tablet Dissolve 1 tablet on the tongue at onset of migraine. May repeat after 2 hours if needed. No more than 2 tablets in a 24 hour period. No more than 4 tablets per week 8 tablet 2   • EPINEPHrine 0.3 MG/0.3ML auto-injector Inject 0.3 mLs into the muscle as needed for Anaphylaxis. 1 each 0   • gabapentin (NEURONTIN) 600 MG tablet TAKE 1 TABLET BY MOUTH IN THE MORNING AND 2 IN THE EVENING. 90 tablet 0   • Humira Pen 40 MG/0.4ML citrate free INJECT 1 PEN (40MG) SUBCUTANEOUSLY EVERY OTHER WEEK     • lidocaine (LIDODERM) 5 % patch as needed.     • diclofenac (VOLTAREN) 75 MG EC tablet Take 1 tablet by mouth 2 times daily. 60 tablet 1   • clobetasol (TEMOVATE) 0.05 % ointment Apply to hand  eruption BID under Cetaphil cream (Patient taking differently: as needed. Apply to hand  eruption BID under Cetaphil cream) 60 g 4   • methotrexate (RHEUMATREX) 2.5 MG tablet Take 8 tablets by mouth 1 day a week. 32 tablet 2   • folic acid (FOLATE) 1 MG tablet Take 2 tablets by mouth daily. 180 tablet 0   • omeprazole (PrilOSEC) 20 MG capsule Take 1 capsule by mouth daily. 30 capsule 5   • colchicine 0.6 MG capsule Take 1 capsule by mouth daily. 30 capsule 5   • Melatonin 5 MG Cap Take 1 capsule by mouth  at bedtime. (Patient taking differently: Take 2,000 mg by mouth at bedtime.) 30 capsule 4   • diphenoxylate-atropine (LOMOTIL) 2.5-0.025 MG tablet Take 1 tablet by mouth 4 times daily as needed for Diarrhea. 40 tablet 2   • cetirizine (ZyrTEC) 10 MG tablet Take 10 mg by mouth daily.     • acetaminophen (TYLENOL) 500 MG tablet Take 1,000 mg by mouth every 6 hours as needed for Pain. Dose: 2 tablets (=1000mg)       No current facility-administered medications for this visit.       Allergies as of 10/26/2022 - Reviewed 10/26/2022   Allergen Reaction Noted   • Cherry   (food or med) ANAPHYLAXIS 01/04/2019   • Vicodin [hydrocodone-acetaminophen] HIVES 03/15/2016   • Chlorhexidine gluconate [chlorhexidine] RASH 07/06/2016   • Iodine   (environmental or med) HIVES 06/07/2016   • Latex RASH 01/01/2020   • Neosporin pain relieving Other (See Comments) 08/19/2017   • Oxycodone HIVES 03/07/2016   • Reglan ANXIETY 01/07/2022   • Shellfish allergy   (food or med) Other (See Comments) 01/04/2019   • Seasonal Runny Nose 01/01/2008       Interval medical history is as above.  She has no other significant problems or complaints.     Environmental Changes: None.    PHYSICAL EXAMINATION:   GENERAL: She is in no acute distress.   VITAL SIGNS:   Visit Vitals  /70 (BP Location: RUE - Right upper extremity, Patient Position: Sitting, Cuff Size: Regular)   Pulse 90   Temp 97.7 °F (36.5 °C) (Temporal)   Resp 17   Wt 89.2 kg (196 lb 10.4 oz)   LMP 10/03/2022 (Approximate) Comment: On 3 month/oral birth control pills   SpO2 98%   BMI 30.80 kg/m²      HEENT: Conjunctivae are not injected. Extraocular movements intact.  External ear exam normal. Tympanic membranes normal.   Nasal mucosa pink, no congestion.  There is no turbinate hypertrophy, polyps, bleeding, ulcer, or discharge.   Oropharynx is clear. There is no thrush. Lips, gums and teeth are normal.   NECK: Supple and flexible without  cervical lymphadenopathy. Trachea midline.  There is no stridor. No thyromegaly.  CHEST: Clear to auscultation. There are no retractions or visible respiratory distress.   no wheezing, no rales or rhonchi. There is good air movement throughout.   CARDIAC: Regular rate and rhythm. Normal S1, S2. No murmur, gallop or rub. There is no peripheral edema.   ABDOMEN: Soft without  epigastric tenderness or rebound. No hepatomegaly or splenomegaly.   SKIN: Warm and dry. No lesions visible. No xerosis  NEUROLOGIC: The patient is alert and oriented x3, appropriate for age. Normal affect.  LYMPH NODES: There are no axillary or supraclavicular lymph nodes palpable.       Based on today's evaluation, I made the following assessment and recommendations:    Alexis was seen today for office visit and follow-up.    Diagnoses and all orders for this visit:    Autoinflammatory syndrome with periodic fever (CMS/HCC) - on colchicine. Flares with viral infections.     Mild intermittent asthma without complication - increased symptoms since COVID infection    Psoriatic arthritis (CMS/HCC) - follows with rheumatology. On methotrexate.    Psoriasis - refill temovate and triamcinolone. Follows with dermatology.     Possible hypoglycemia - has appointment with endocrinology pending.    · Patient's asthma somewhat controlled since developing COVID in September. Some improvement with Albuterol use. We will try adding Symbicort 2 puffs BID until her asthma is better controlled.    · Discussed patient's concern for allergy to whey protein. Given history of the reaction, I informed her that it is unlikely this would progress into anaphylaxis.reassurance given.  Food allergy testing would not be helpful in this setting.   · Encouraged her to follow up with pain clinic to get a management plan for her acute pain flares that occur with viral URI's   · Advised patient to wait 3 months after COVID-19 infection and then get another COVID vaccination.   · All questions answered.       PLAN: Return  in about 3 months (around 1/26/2023) for asthma.    Patient Instructions   It was my pleasure seeing you today.  I look forward to working with you. If after having time to think about the information presented, something is unclear or you have further questions, please reach out to me via phone or through the portal.     Please be aware that you may receive a survey regarding today's visit either in the mail or electronically. Please take the time to complete the survey as I am always hoping to improve the care I provide.      Thank you for your consideration.    Josefina Mead M.D.           Thank you for allowing me to participate in her care.     On 10/26/2022, IMonie scribed the services personally performed by Josefina Mead MD     I have reviewed and edited the visit summary above and attest that it is accurate.The documentation recorded by the scribe accurately and completely reflects the service(s) I personally performed and the decisions made by me.        No

## 2023-06-27 NOTE — DISCHARGE NOTE PROVIDER - CARE PROVIDER_API CALL
Jose Alan  Cardiac Electrophysiology  [address] [place] [id number]  Phone: [phone]  Fax: [phone]  Follow Up Time: 1 month   Jose Alan  Cardiac Electrophysiology  [address] [place] [address]  Phone: [phone]  Fax: [phone]  Scheduled Appointment: 07/27/2023 03:30 PM

## 2023-06-28 ENCOUNTER — TRANSCRIPTION ENCOUNTER (OUTPATIENT)
Age: 83
End: 2023-06-28

## 2023-06-28 ENCOUNTER — APPOINTMENT (OUTPATIENT)
Dept: GASTROENTEROLOGY | Facility: CLINIC | Age: 83
End: 2023-06-28

## 2023-06-28 VITALS — SYSTOLIC BLOOD PRESSURE: 91 MMHG | HEART RATE: 103 BPM | DIASTOLIC BLOOD PRESSURE: 52 MMHG | TEMPERATURE: 97 F

## 2023-06-28 LAB — ALLERGY+IMMUNOLOGY DIAG STUDY NOTE: SIGNIFICANT CHANGE UP

## 2023-06-28 PROCEDURE — 99238 HOSP IP/OBS DSCHRG MGMT 30/<: CPT

## 2023-06-28 PROCEDURE — 71046 X-RAY EXAM CHEST 2 VIEWS: CPT | Mod: 26

## 2023-06-28 PROCEDURE — 93306 TTE W/DOPPLER COMPLETE: CPT | Mod: 26

## 2023-06-28 PROCEDURE — 93010 ELECTROCARDIOGRAM REPORT: CPT

## 2023-06-28 RX ORDER — METOPROLOL TARTRATE 50 MG
1 TABLET ORAL
Qty: 60 | Refills: 0
Start: 2023-06-28 | End: 2023-07-27

## 2023-06-28 RX ORDER — CLOPIDOGREL BISULFATE 75 MG/1
1 TABLET, FILM COATED ORAL
Qty: 30 | Refills: 0
Start: 2023-06-28 | End: 2023-07-27

## 2023-06-28 RX ORDER — ASPIRIN/CALCIUM CARB/MAGNESIUM 324 MG
1 TABLET ORAL
Qty: 30 | Refills: 0
Start: 2023-06-28 | End: 2023-07-27

## 2023-06-28 RX ORDER — METOPROLOL TARTRATE 50 MG
1 TABLET ORAL
Refills: 0 | DISCHARGE

## 2023-06-28 RX ORDER — METOPROLOL TARTRATE 50 MG
1 TABLET ORAL
Qty: 0 | Refills: 0 | DISCHARGE

## 2023-06-28 RX ADMIN — Medication 325 MILLIGRAM(S): at 12:54

## 2023-06-28 RX ADMIN — Medication 50 MILLIGRAM(S): at 05:51

## 2023-06-28 RX ADMIN — Medication 112 MICROGRAM(S): at 05:52

## 2023-06-28 RX ADMIN — Medication 250 MILLIGRAM(S): at 05:52

## 2023-06-28 RX ADMIN — CLOPIDOGREL BISULFATE 75 MILLIGRAM(S): 75 TABLET, FILM COATED ORAL at 12:54

## 2023-06-28 NOTE — PROGRESS NOTE ADULT - SUBJECTIVE AND OBJECTIVE BOX
INTERVAL HPI/OVERNIGHT EVENTS:    Patient s/p Amulet  No events over night. Pt without complaints    MEDICATIONS  (STANDING):  aspirin 325 milliGRAM(s) Oral daily  clopidogrel Tablet 75 milliGRAM(s) Oral daily  ketamine Injectable 50 milliGRAM(s) IV Push once  levothyroxine 112 MICROGram(s) Oral daily  metoprolol succinate ER 50 milliGRAM(s) Oral daily  vancomycin  IVPB 1000 milliGRAM(s) IV Intermittent once    MEDICATIONS  (PRN):  acetaminophen     Tablet .. 650 milliGRAM(s) Oral every 6 hours PRN Mild Pain (1 - 3)  melatonin 3 milliGRAM(s) Oral at bedtime PRN Insomnia    Allergies    penicillins (Rash)    Intolerances    Vital Signs Last 24 Hrs  T(C): 36.2 (28 Jun 2023 09:09), Max: 36.4 (28 Jun 2023 00:32)  T(F): 97.2 (28 Jun 2023 09:09), Max: 97.5 (28 Jun 2023 00:32)  HR: 103 (28 Jun 2023 09:09) (91 - 103)  BP: 91/52 (28 Jun 2023 09:09) (91/52 - 150/78)  BP(mean): 66 (28 Jun 2023 09:09) (66 - 108)  RR: 18 (28 Jun 2023 05:49) (18 - 18)  SpO2: 90% (28 Jun 2023 05:49) (90% - 95%)    Parameters below as of 27 Jun 2023 17:18  Patient On (Oxygen Delivery Method): room air    Groin without hematoma, ecchymosis. Stitches removed

## 2023-06-28 NOTE — PROGRESS NOTE ADULT - ASSESSMENT
A/P   Patient  s/p ICD/ PPM/ BiV- ICD implant    - Cont ASA/Plavix  - pt may shower today  - no bath/swimming x 1 week  - ok to discharge home today  - Follow up in 1 month  week with EP A/P   Patient  s/p  Amulet    - Cont ASA/Plavix  - pt may shower today  - no bath/swimming x 1 week  - ok to discharge home today  - Follow up in 1 month  week with EP

## 2023-06-28 NOTE — DISCHARGE NOTE NURSING/CASE MANAGEMENT/SOCIAL WORK - PATIENT PORTAL LINK FT
You can access the FollowMyHealth Patient Portal offered by [facility] by registering at the following website: [email or web address]. By joining [employer]’s FollowMyHealth portal, you will also be able to view your health information using other applications (apps) compatible with our system.

## 2023-06-28 NOTE — DISCHARGE NOTE NURSING/CASE MANAGEMENT/SOCIAL WORK - NSDCPEFALRISK_GEN_ALL_CORE
For information on Fall & Injury Prevention, visit: [email or web address] OR  [email or web address] OR  https://www.cdc.gov/steadi/patient.html

## 2023-06-30 DIAGNOSIS — Z88.0 ALLERGY STATUS TO PENICILLIN: ICD-10-CM

## 2023-06-30 DIAGNOSIS — I10 ESSENTIAL (PRIMARY) HYPERTENSION: ICD-10-CM

## 2023-06-30 DIAGNOSIS — Z79.899 OTHER LONG TERM (CURRENT) DRUG THERAPY: ICD-10-CM

## 2023-06-30 DIAGNOSIS — E03.9 HYPOTHYROIDISM, UNSPECIFIED: ICD-10-CM

## 2023-06-30 DIAGNOSIS — I48.19 OTHER PERSISTENT ATRIAL FIBRILLATION: ICD-10-CM

## 2023-06-30 DIAGNOSIS — Z87.891 PERSONAL HISTORY OF NICOTINE DEPENDENCE: ICD-10-CM

## 2023-06-30 DIAGNOSIS — E78.00 PURE HYPERCHOLESTEROLEMIA, UNSPECIFIED: ICD-10-CM

## 2023-06-30 DIAGNOSIS — I35.0 NONRHEUMATIC AORTIC (VALVE) STENOSIS: ICD-10-CM

## 2023-06-30 DIAGNOSIS — Z79.890 HORMONE REPLACEMENT THERAPY: ICD-10-CM

## 2023-07-18 LAB
HCT VFR BLD CALC: 33.4 %
HGB BLD-MCNC: 10.3 G/DL
MCHC RBC-ENTMCNC: 30.8 G/DL
MCHC RBC-ENTMCNC: 31.8 PG
MCV RBC AUTO: 103.1 FL
PLATELET # BLD AUTO: 206 K/UL
PMV BLD: 11.7 FL
RBC # BLD: 3.24 M/UL
RBC # FLD: 13.4 %
WBC # FLD AUTO: 6.56 K/UL

## 2023-07-24 LAB
HCT VFR BLD CALC: 32.4 %
HGB BLD-MCNC: 10.2 G/DL
MCHC RBC-ENTMCNC: 31.5 G/DL
MCHC RBC-ENTMCNC: 32.4 PG
MCV RBC AUTO: 102.9 FL
PLATELET # BLD AUTO: 201 K/UL
PMV BLD: 11.8 FL
RBC # BLD: 3.15 M/UL
RBC # FLD: 13.3 %
WBC # FLD AUTO: 6.96 K/UL

## 2023-07-27 ENCOUNTER — APPOINTMENT (OUTPATIENT)
Dept: ELECTROPHYSIOLOGY | Facility: CLINIC | Age: 83
End: 2023-07-27
Payer: MEDICARE

## 2023-07-27 VITALS
BODY MASS INDEX: 34.38 KG/M2 | HEART RATE: 80 BPM | TEMPERATURE: 97.1 F | DIASTOLIC BLOOD PRESSURE: 60 MMHG | WEIGHT: 194 LBS | HEIGHT: 63 IN | RESPIRATION RATE: 16 BRPM | SYSTOLIC BLOOD PRESSURE: 110 MMHG

## 2023-07-27 PROCEDURE — 99215 OFFICE O/P EST HI 40 MIN: CPT | Mod: 25

## 2023-07-27 PROCEDURE — 93000 ELECTROCARDIOGRAM COMPLETE: CPT

## 2023-07-27 RX ORDER — CHLORHEXIDINE GLUCONATE 4 %
325 (65 FE) LIQUID (ML) TOPICAL
Qty: 60 | Refills: 0 | Status: COMPLETED | COMMUNITY
Start: 2022-10-04 | End: 2023-07-27

## 2023-07-27 RX ORDER — PANTOPRAZOLE 40 MG/1
40 TABLET, DELAYED RELEASE ORAL DAILY
Qty: 1 | Refills: 3 | Status: COMPLETED | COMMUNITY
Start: 2023-03-15 | End: 2023-07-27

## 2023-07-28 LAB
HCT VFR BLD CALC: 33 %
HGB BLD-MCNC: 10.2 G/DL
MCHC RBC-ENTMCNC: 30.9 G/DL
MCHC RBC-ENTMCNC: 31.3 PG
MCV RBC AUTO: 101.2 FL
PLATELET # BLD AUTO: 239 K/UL
PMV BLD: 11.5 FL
RBC # BLD: 3.26 M/UL
RBC # FLD: 13.2 %
WBC # FLD AUTO: 6.18 K/UL

## 2023-07-31 ENCOUNTER — NON-APPOINTMENT (OUTPATIENT)
Age: 83
End: 2023-07-31

## 2023-08-09 ENCOUNTER — APPOINTMENT (OUTPATIENT)
Dept: GASTROENTEROLOGY | Facility: CLINIC | Age: 83
End: 2023-08-09
Payer: MEDICARE

## 2023-08-09 ENCOUNTER — OUTPATIENT (OUTPATIENT)
Dept: OUTPATIENT SERVICES | Facility: HOSPITAL | Age: 83
LOS: 1 days | End: 2023-08-09
Payer: MEDICARE

## 2023-08-09 VITALS
HEART RATE: 73 BPM | BODY MASS INDEX: 35.08 KG/M2 | SYSTOLIC BLOOD PRESSURE: 113 MMHG | HEIGHT: 63 IN | OXYGEN SATURATION: 98 % | WEIGHT: 198 LBS | DIASTOLIC BLOOD PRESSURE: 73 MMHG

## 2023-08-09 DIAGNOSIS — Z90.49 ACQUIRED ABSENCE OF OTHER SPECIFIED PARTS OF DIGESTIVE TRACT: Chronic | ICD-10-CM

## 2023-08-09 DIAGNOSIS — Z00.00 ENCOUNTER FOR GENERAL ADULT MEDICAL EXAMINATION WITHOUT ABNORMAL FINDINGS: ICD-10-CM

## 2023-08-09 PROCEDURE — 36415 COLL VENOUS BLD VENIPUNCTURE: CPT

## 2023-08-09 PROCEDURE — 99214 OFFICE O/P EST MOD 30 MIN: CPT | Mod: GC

## 2023-08-09 PROCEDURE — 85027 COMPLETE CBC AUTOMATED: CPT

## 2023-08-09 PROCEDURE — 99214 OFFICE O/P EST MOD 30 MIN: CPT

## 2023-08-09 NOTE — PHYSICAL EXAM
[Alert] : alert [Normal Voice/Communication] : normal voice/communication [Normal] : the appearance was normal, no neck mass [No Respiratory Distress] : no respiratory distress [No Acc Muscle Use] : no accessory muscle use [Bowel Sounds] : normal bowel sounds [Abdomen Tenderness] : non-tender [Abdomen Soft] : soft [Normal Color / Pigmentation] : normal skin color and pigmentation [No Focal Deficits] : no focal deficits [Oriented To Time, Place, And Person] : oriented to person, place, and time [de-identified] : AS murmur

## 2023-08-09 NOTE — END OF VISIT
[] : Fellow [FreeTextEntry3] : Hx of oscure GI bleed (melena with drop in hgb) previous work up w EGD/colonoscopy/VCE no diagnostic refrred back from cardiology prior to planned TAVR procedure. Discussed the case with cardiology and the family and offered repeat endoscopies. Pt to follow up with cardiology as she is not inclined to undergo the work up again. Explained the reason for repeating the work up at length. Will follow up when decision is made to pursue the procedures. [Time Spent: ___ minutes] : I have spent [unfilled] minutes of time on the encounter.

## 2023-08-09 NOTE — HISTORY OF PRESENT ILLNESS
[FreeTextEntry1] : 82 year old Female with mod-severe AS pending TAVR , persistent A fib (s/p recent AMulet device) not on any AC or antiplatelet currently, hx of H pylori gastritis s/p rx and eradication  presented to clinic as follow up for  clearance before cath/TAVR. Patient was being followed for anemia and melena when on xarelto s/p EGD , colonoscopy, VCE in 2022 : no active source of bleeding. She had a watchman device and amluet was placed on plavix and asa recently, she had black stool. Recent Hb 7/27 : 10.2 . Dr Alan stopped antiplatelets and no longer has any black stool.

## 2023-08-09 NOTE — ASSESSMENT
[FreeTextEntry1] : 82 year old Female with mod-severe AS pending TAVR , persistent A fib (s/p recent AMulet device) not on any AC or antiplatelet currently, hx of H pylori gastritis s/p rx and eradication  presented to clinic as follow up for  clearance before cath/TAVR. Patient was being followed for anemia and melena when on xarelto s/p EGD , colonoscopy, VCE in 2022 : no active source of bleeding. She had a watchman device and amluet was placed on plavix and asa recently, she had black stool. Recent Hb 7/27 : 10.2 . Dr Alan stopped antiplatelets and no longer has any black stool.  #Anemia with reported melena recently - currently off plavix and asa - patient asymptomatic currently - recent Hb: 7/27: 10.2  - as per Structural Heart, they would like clearance before they proceed foor cath/TAVR  PLAn - recommended EGD/Colonoscopy but patient and her daughter were not ready given recent procedure last year. 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

## 2023-08-10 ENCOUNTER — APPOINTMENT (OUTPATIENT)
Dept: CARDIOTHORACIC SURGERY | Facility: CLINIC | Age: 83
End: 2023-08-10

## 2023-08-10 ENCOUNTER — APPOINTMENT (OUTPATIENT)
Dept: CARDIOLOGY | Facility: CLINIC | Age: 83
End: 2023-08-10
Payer: MEDICARE

## 2023-08-10 PROCEDURE — 99214 OFFICE O/P EST MOD 30 MIN: CPT

## 2023-08-13 NOTE — END OF VISIT
[FreeTextEntry3] : Clinically stable. s/p LAAO.  Recurrent GIB on DAPT.  Patient refuses to have another colonoscopy.  I spoke with Dr. Lockhart.  Reasonable to proceed with EGD / VCE.  If AVM likely to heal / improve post TAVR will proceed.

## 2023-08-13 NOTE — PHYSICAL EXAM
[Well Developed] : well developed [Well Nourished] : well nourished [No Acute Distress] : no acute distress [Normal Conjunctiva] : normal conjunctiva [Normal Venous Pressure] : normal venous pressure [No Carotid Bruit] : no carotid bruit [Normal S1, S2] : normal S1, S2 [Clear Lung Fields] : clear lung fields [Good Air Entry] : good air entry [No Respiratory Distress] : no respiratory distress  [Soft] : abdomen soft [Non Tender] : non-tender [No Masses/organomegaly] : no masses/organomegaly [Normal Bowel Sounds] : normal bowel sounds [Normal Gait] : normal gait [No Edema] : no edema [No Cyanosis] : no cyanosis [No Clubbing] : no clubbing [No Varicosities] : no varicosities [No Rash] : no rash [No Skin Lesions] : no skin lesions [Moves all extremities] : moves all extremities [No Focal Deficits] : no focal deficits [Normal Speech] : normal speech [Alert and Oriented] : alert and oriented [Normal memory] : normal memory [de-identified] : RUSB IV

## 2023-08-13 NOTE — ASSESSMENT
[FreeTextEntry1] : Plan:  #Aortic Stenosis Needs TAVR but recent GI Bleed requiring blood transfusions. Needs Endoscopy and Colonoscopy for further evaluations. Patient does not want to have GI evaluations. Patient adamant about not having a GI workup.   Extensive education happened to explain why this workup is needed. Dr. Spaulding explained need for evaluation.  After discussion with Daughter and Mother  They will proceed with GI workup and then after diagnosis will then plan for TAVR.

## 2023-08-13 NOTE — REASON FOR VISIT
[Symptom and Test Evaluation] : symptom and test evaluation [Structural Heart and Valve Disease] : structural heart and valve disease [FreeTextEntry1] : Ms. ASAD MENDOSA 82 year F, former smoker, arrives today for follow up of their Severe Aortic Stenosis. Patient PMH include A fib (Xarelto held since October), HTN, hypothyroidism and anemia. Symptoms include worsening Fatigue and SOB for 6 months. She was admitted in October 2022 for HGB 6.6+, her EGD showed H Pylori and Gastritis, Colon both negative for active bleeding. NYHA class III. Had capsule EGD 11/30/2022 He had an echocardiogram 11/2022 which showed moderate to severe aortic stenosis with Peak Gradient 37.8 Mean Gradient of 22.7 [place] 1.04. Here for discussion of potential TAVR. NYHA class III Former light smoker- Quit 30+ years ago Pt lives home with daughter quinn aide Lives in [place] Born in [place] Spe[place], Daughter Britney

## 2023-08-15 DIAGNOSIS — D64.9 ANEMIA, UNSPECIFIED: ICD-10-CM

## 2023-08-16 ENCOUNTER — APPOINTMENT (OUTPATIENT)
Dept: GASTROENTEROLOGY | Facility: CLINIC | Age: 83
End: 2023-08-16

## 2023-08-18 DIAGNOSIS — Z00.00 ENCOUNTER FOR GENERAL ADULT MEDICAL EXAMINATION W/OUT ABNORMAL FINDINGS: ICD-10-CM

## 2023-08-22 NOTE — HISTORY OF PRESENT ILLNESS
[FreeTextEntry1] : Ms. Mcgee is an 82 year-old female with hx of moderate to severe aortic stenosis, persistent AF, HTN, dyslipidemia, GI bleed, is here for discussion of management of AF.   Accompanied by daughter.  Had an episode of AF. Started on anticoagulation. Had a GI bleed - endoscopy, colonoscopy, and capsule endoscopy done without any source identified. Restarted anticoagulation. Had a re-bleed.   + STRONG   No SOB at rest.   Followed by structural team. Last appointment cancelled due to emergency on physician's part.   6/22: Continues to feel STRONG. Agreeable for LAAO. 7/27/23: s/p melena. Recommended to go to ER- didn't want to go despite understanding. Stopped DAPT. Melena gone. Not on SAPT/DAPT. Undergoing w-up for TAVR, but not GI.  Denies chest pain, shortness of breath, palpitation, dizziness or LOC except noted above.  EKG (07/27/23): AF EKG (04/06/2023): AF @ 83 bpm Echo (11/2022): Normal EF, moderate to severe AS, mild AR, mild MR. Cardio: Dr. Behuria Structural heart: Dr. Spaulding

## 2023-08-22 NOTE — ASSESSMENT
[FreeTextEntry1] : ## Moderate to Severe Aortic Stenosis  ## Persistent AF s/p Amulet ## Recurrent Severe GI Bleed   - CHADSVASC score of 4+ (age, female, AS). Patient denies history of HTN. However, documented to have HTN.  - s/p Amulet. Not on any anti-platelet due to melena.  - Discussed that patient needs to be on Aspirin and Plavix for 6 months after the procedure. Also discussed that there is a small possibility that patient can develop thrombus on Amulet or have a leak which will need short-term or long-term anticoagulation.  - Therefore, they should repeat GI work-up. If etiology identified, need to go back on DPAT.  - Patient and daughter understand and are agreeable to proceed.  - Planned for TAVR with Dr. Spaulding from structural heart. Will need BALDEMAR at that time to assess Amulet patency. - Return in 6 months

## 2023-08-24 ENCOUNTER — TRANSCRIPTION ENCOUNTER (OUTPATIENT)
Age: 83
End: 2023-08-24

## 2023-08-24 ENCOUNTER — NON-APPOINTMENT (OUTPATIENT)
Age: 83
End: 2023-08-24

## 2023-08-24 ENCOUNTER — OUTPATIENT (OUTPATIENT)
Dept: OUTPATIENT SERVICES | Facility: HOSPITAL | Age: 83
LOS: 1 days | Discharge: HOME | End: 2023-08-24
Payer: MEDICARE

## 2023-08-24 ENCOUNTER — RESULT REVIEW (OUTPATIENT)
Age: 83
End: 2023-08-24

## 2023-08-24 VITALS
RESPIRATION RATE: 18 BRPM | DIASTOLIC BLOOD PRESSURE: 78 MMHG | HEART RATE: 79 BPM | SYSTOLIC BLOOD PRESSURE: 124 MMHG | OXYGEN SATURATION: 100 %

## 2023-08-24 VITALS
SYSTOLIC BLOOD PRESSURE: 157 MMHG | WEIGHT: 195.11 LBS | DIASTOLIC BLOOD PRESSURE: 96 MMHG | HEART RATE: 95 BPM | TEMPERATURE: 97 F | RESPIRATION RATE: 18 BRPM | HEIGHT: 63 IN

## 2023-08-24 DIAGNOSIS — Z95.818 PRESENCE OF OTHER CARDIAC IMPLANTS AND GRAFTS: Chronic | ICD-10-CM

## 2023-08-24 DIAGNOSIS — Z90.49 ACQUIRED ABSENCE OF OTHER SPECIFIED PARTS OF DIGESTIVE TRACT: Chronic | ICD-10-CM

## 2023-08-24 DIAGNOSIS — D64.9 ANEMIA, UNSPECIFIED: ICD-10-CM

## 2023-08-24 PROCEDURE — 88312 SPECIAL STAINS GROUP 1: CPT

## 2023-08-24 PROCEDURE — 88305 TISSUE EXAM BY PATHOLOGIST: CPT

## 2023-08-24 PROCEDURE — 88312 SPECIAL STAINS GROUP 1: CPT | Mod: 26

## 2023-08-24 PROCEDURE — 88305 TISSUE EXAM BY PATHOLOGIST: CPT | Mod: 26

## 2023-08-24 PROCEDURE — 43239 EGD BIOPSY SINGLE/MULTIPLE: CPT

## 2023-08-24 NOTE — ASU DISCHARGE PLAN (ADULT/PEDIATRIC) - NS MD DC FALL RISK RISK
For information on Fall & Injury Prevention, visit: [email or web address] OR  [email or web address] OR  https://www.cdc.gov/steadi/patient.html

## 2023-08-24 NOTE — ASU PATIENT PROFILE, ADULT - NSICDXPASTMEDICALHX_GEN_ALL_CORE_FT
PAST MEDICAL HISTORY:  Aortic stenosis     Atrial fibrillation     Former smoker     HTN (hypertension)     Hypercholesteremia     Hypothyroid

## 2023-08-24 NOTE — CHART NOTE - NSCHARTNOTEFT_GEN_A_CORE
PACU ANESTHESIA ADMISSION NOTE      Procedure:   Post op diagnosis:      ____  Intubated  TV:______       Rate: ______      FiO2: ______    _x___  Patent Airway    _x___  Full return of protective reflexes    _x___  Full recovery from anesthesia / back to baseline status    Vitals:    BP  123/76  P  58  R  15  Sat  99    Mental Status:  _x___ Awake   _____ Alert   _____ Drowsy   _____ Sedated    Nausea/Vomiting:  _x___  NO       ______Yes,   See Post - Op Orders         Pain Scale (0-10):  __0___    Treatment: _x___ None    ____ See Post - Op/PCA Orders    Post - Operative Fluids:   __x__ Oral   ____ See Post - Op Orders    Plan: Discharge:   _x___Home       _____Floor     _____Critical Care    _____  Other:_________________    Comments:  No anesthesia issues or complications noted.  Discharge when criteria met.

## 2023-08-24 NOTE — H&P PST ADULT - RESPIRATORY
clear to auscultation bilaterally clear to auscultation bilaterally/no respiratory distress/no use of accessory muscles

## 2023-08-24 NOTE — ASU PATIENT PROFILE, ADULT - NSICDXPASTSURGICALHX_GEN_ALL_CORE_FT
PAST SURGICAL HISTORY:  Presence of Amulet left atrial appendage closure device     Presence of Watchman left atrial appendage closure device     S/P appendectomy

## 2023-08-25 LAB
ANION GAP SERPL CALC-SCNC: 12 MMOL/L
BUN SERPL-MCNC: 21 MG/DL
CALCIUM SERPL-MCNC: 9.2 MG/DL
CHLORIDE SERPL-SCNC: 103 MMOL/L
CO2 SERPL-SCNC: 27 MMOL/L
CREAT SERPL-MCNC: 0.9 MG/DL
EGFR: 63 ML/MIN/1.73M2
GLUCOSE SERPL-MCNC: 90 MG/DL
POTASSIUM SERPL-SCNC: 5.5 MMOL/L
SODIUM SERPL-SCNC: 142 MMOL/L

## 2023-08-29 DIAGNOSIS — Z88.0 ALLERGY STATUS TO PENICILLIN: ICD-10-CM

## 2023-08-29 DIAGNOSIS — E78.00 PURE HYPERCHOLESTEROLEMIA, UNSPECIFIED: ICD-10-CM

## 2023-08-29 DIAGNOSIS — E03.9 HYPOTHYROIDISM, UNSPECIFIED: ICD-10-CM

## 2023-08-29 DIAGNOSIS — I10 ESSENTIAL (PRIMARY) HYPERTENSION: ICD-10-CM

## 2023-08-29 DIAGNOSIS — Z87.891 PERSONAL HISTORY OF NICOTINE DEPENDENCE: ICD-10-CM

## 2023-08-29 DIAGNOSIS — I48.91 UNSPECIFIED ATRIAL FIBRILLATION: ICD-10-CM

## 2023-08-29 DIAGNOSIS — K29.50 UNSPECIFIED CHRONIC GASTRITIS WITHOUT BLEEDING: ICD-10-CM

## 2023-08-29 DIAGNOSIS — D64.9 ANEMIA, UNSPECIFIED: ICD-10-CM

## 2023-08-29 DIAGNOSIS — Z79.82 LONG TERM (CURRENT) USE OF ASPIRIN: ICD-10-CM

## 2023-08-29 DIAGNOSIS — K44.9 DIAPHRAGMATIC HERNIA WITHOUT OBSTRUCTION OR GANGRENE: ICD-10-CM

## 2023-09-01 ENCOUNTER — RESULT REVIEW (OUTPATIENT)
Age: 83
End: 2023-09-01

## 2023-09-01 ENCOUNTER — OUTPATIENT (OUTPATIENT)
Dept: OUTPATIENT SERVICES | Facility: HOSPITAL | Age: 83
LOS: 1 days | End: 2023-09-01
Payer: MEDICARE

## 2023-09-01 DIAGNOSIS — Z00.00 ENCOUNTER FOR GENERAL ADULT MEDICAL EXAMINATION WITHOUT ABNORMAL FINDINGS: ICD-10-CM

## 2023-09-01 DIAGNOSIS — Z95.818 PRESENCE OF OTHER CARDIAC IMPLANTS AND GRAFTS: Chronic | ICD-10-CM

## 2023-09-01 DIAGNOSIS — Z90.49 ACQUIRED ABSENCE OF OTHER SPECIFIED PARTS OF DIGESTIVE TRACT: Chronic | ICD-10-CM

## 2023-09-01 PROCEDURE — 74174 CTA ABD&PLVS W/CONTRAST: CPT | Mod: 26

## 2023-09-01 PROCEDURE — 75574 CT ANGIO HRT W/3D IMAGE: CPT | Mod: 26

## 2023-09-01 PROCEDURE — 75574 CT ANGIO HRT W/3D IMAGE: CPT

## 2023-09-01 PROCEDURE — 74174 CTA ABD&PLVS W/CONTRAST: CPT

## 2023-09-02 DIAGNOSIS — Z00.00 ENCOUNTER FOR GENERAL ADULT MEDICAL EXAMINATION WITHOUT ABNORMAL FINDINGS: ICD-10-CM

## 2023-09-15 ENCOUNTER — OUTPATIENT (OUTPATIENT)
Dept: OUTPATIENT SERVICES | Facility: HOSPITAL | Age: 83
LOS: 1 days | End: 2023-09-15
Payer: MEDICARE

## 2023-09-15 ENCOUNTER — RESULT REVIEW (OUTPATIENT)
Age: 83
End: 2023-09-15

## 2023-09-15 DIAGNOSIS — Z95.818 PRESENCE OF OTHER CARDIAC IMPLANTS AND GRAFTS: Chronic | ICD-10-CM

## 2023-09-15 DIAGNOSIS — Z00.8 ENCOUNTER FOR OTHER GENERAL EXAMINATION: ICD-10-CM

## 2023-09-15 DIAGNOSIS — I35.0 NONRHEUMATIC AORTIC (VALVE) STENOSIS: ICD-10-CM

## 2023-09-15 DIAGNOSIS — Z90.49 ACQUIRED ABSENCE OF OTHER SPECIFIED PARTS OF DIGESTIVE TRACT: Chronic | ICD-10-CM

## 2023-09-15 PROCEDURE — 93880 EXTRACRANIAL BILAT STUDY: CPT | Mod: 26

## 2023-09-15 PROCEDURE — 93880 EXTRACRANIAL BILAT STUDY: CPT

## 2023-09-15 PROCEDURE — 94727 GAS DIL/WSHOT DETER LNG VOL: CPT | Mod: 26

## 2023-09-15 PROCEDURE — 94664 DEMO&/EVAL PT USE INHALER: CPT

## 2023-09-15 PROCEDURE — 94729 DIFFUSING CAPACITY: CPT | Mod: 26

## 2023-09-15 PROCEDURE — 94060 EVALUATION OF WHEEZING: CPT | Mod: 26

## 2023-09-15 PROCEDURE — 94726 PLETHYSMOGRAPHY LUNG VOLUMES: CPT

## 2023-09-15 PROCEDURE — 94070 EVALUATION OF WHEEZING: CPT

## 2023-09-15 PROCEDURE — 94729 DIFFUSING CAPACITY: CPT

## 2023-09-16 DIAGNOSIS — I35.0 NONRHEUMATIC AORTIC (VALVE) STENOSIS: ICD-10-CM

## 2023-09-21 ENCOUNTER — APPOINTMENT (OUTPATIENT)
Dept: CARDIOLOGY | Facility: CLINIC | Age: 83
End: 2023-09-21

## 2023-09-25 NOTE — H&P CARDIOLOGY - RESPIRATORY RATE (BREATHS/MIN)
PHYSICAL / OCCUPATIONAL THERAPY - DAILY TREATMENT NOTE (updated )    Patient Name: Lavern Pan    Date: 2023    : 1974  Insurance: Payor: Holger Cosby / Plan: Holger Cosby / Product Type: *No Product type* /      Patient  verified Yes     Visit #   Current / Total 4 10   Time   In / Out 11:00 11:41   Pain   In / Out 0/10 0/10   Subjective Functional Status/Changes: I'm not in any pain just stiff. The stiffness gets better as the day goes on. TREATMENT AREA =  Fall from chair, initial encounter [W07. XXXA]  Rib pain on left side [R07.81]  Lumbar pain on palpation [M54.50]    OBJECTIVE         Therapeutic Procedures: Tx Min Billable or 1:1 Min (if diff from Tx Min) Procedure, Rationale, Specifics   16 16 46160 Therapeutic Exercise (timed):  increase ROM, strength, coordination, balance, and proprioception to improve patient's ability to progress to PLOF and address remaining functional goals. (see flow sheet as applicable)     Details if applicable:        48187 Neuromuscular Re-Education (timed):  improve balance, coordination, kinesthetic sense, posture, core stability and proprioception to improve patient's ability to develop conscious control of individual muscles and awareness of position of extremities in order to progress to PLOF and address remaining functional goals.  (see flow sheet as applicable)     Details if applicable:                    39 41 MC BC Totals Reminder: bill using total billable min of TIMED therapeutic procedures (example: do not include dry needle or estim unattended, both untimed codes, in totals to left)  8-22 min = 1 unit; 23-37 min = 2 units; 38-52 min = 3 units; 53-67 min = 4 units; 68-82 min = 5 units   Total Total     [x]  Patient Education billed concurrently with other procedures   [x] Review HEP    [] Progressed/Changed HEP, detail:    [] Other detail:       Objective Information/Functional Measures/Assessment    Pt reports to skilled 16

## 2023-10-04 ENCOUNTER — RESULT REVIEW (OUTPATIENT)
Age: 83
End: 2023-10-04

## 2023-10-04 ENCOUNTER — OUTPATIENT (OUTPATIENT)
Dept: OUTPATIENT SERVICES | Facility: HOSPITAL | Age: 83
LOS: 1 days | End: 2023-10-04
Payer: MEDICARE

## 2023-10-04 VITALS
DIASTOLIC BLOOD PRESSURE: 80 MMHG | SYSTOLIC BLOOD PRESSURE: 140 MMHG | RESPIRATION RATE: 15 BRPM | OXYGEN SATURATION: 99 % | WEIGHT: 199.3 LBS | HEIGHT: 63 IN | HEART RATE: 81 BPM | TEMPERATURE: 98 F

## 2023-10-04 DIAGNOSIS — Z90.49 ACQUIRED ABSENCE OF OTHER SPECIFIED PARTS OF DIGESTIVE TRACT: Chronic | ICD-10-CM

## 2023-10-04 DIAGNOSIS — I35.0 NONRHEUMATIC AORTIC (VALVE) STENOSIS: ICD-10-CM

## 2023-10-04 DIAGNOSIS — Z95.818 PRESENCE OF OTHER CARDIAC IMPLANTS AND GRAFTS: Chronic | ICD-10-CM

## 2023-10-04 DIAGNOSIS — Z01.818 ENCOUNTER FOR OTHER PREPROCEDURAL EXAMINATION: ICD-10-CM

## 2023-10-04 LAB
A1C WITH ESTIMATED AVERAGE GLUCOSE RESULT: 5.9 % — HIGH (ref 4–5.6)
ALBUMIN SERPL ELPH-MCNC: 4.2 G/DL — SIGNIFICANT CHANGE UP (ref 3.5–5.2)
ALP SERPL-CCNC: 112 U/L — SIGNIFICANT CHANGE UP (ref 30–115)
ALT FLD-CCNC: 17 U/L — SIGNIFICANT CHANGE UP (ref 0–41)
ANION GAP SERPL CALC-SCNC: 13 MMOL/L — SIGNIFICANT CHANGE UP (ref 7–14)
APPEARANCE UR: CLEAR — SIGNIFICANT CHANGE UP
APTT BLD: 57.1 SEC — HIGH (ref 27–39.2)
AST SERPL-CCNC: 21 U/L — SIGNIFICANT CHANGE UP (ref 0–41)
BACTERIA # UR AUTO: NEGATIVE /HPF — SIGNIFICANT CHANGE UP
BASOPHILS # BLD AUTO: 0.03 K/UL — SIGNIFICANT CHANGE UP (ref 0–0.2)
BASOPHILS NFR BLD AUTO: 0.5 % — SIGNIFICANT CHANGE UP (ref 0–1)
BILIRUB SERPL-MCNC: 0.8 MG/DL — SIGNIFICANT CHANGE UP (ref 0.2–1.2)
BILIRUB UR-MCNC: NEGATIVE — SIGNIFICANT CHANGE UP
BLD GP AB SCN SERPL QL: SIGNIFICANT CHANGE UP
BUN SERPL-MCNC: 21 MG/DL — HIGH (ref 10–20)
CALCIUM SERPL-MCNC: 9.3 MG/DL — SIGNIFICANT CHANGE UP (ref 8.4–10.5)
CHLORIDE SERPL-SCNC: 101 MMOL/L — SIGNIFICANT CHANGE UP (ref 98–110)
CO2 SERPL-SCNC: 28 MMOL/L — SIGNIFICANT CHANGE UP (ref 17–32)
COLOR SPEC: YELLOW — SIGNIFICANT CHANGE UP
CREAT SERPL-MCNC: 1 MG/DL — SIGNIFICANT CHANGE UP (ref 0.7–1.5)
DIFF PNL FLD: NEGATIVE — SIGNIFICANT CHANGE UP
EGFR: 56 ML/MIN/1.73M2 — LOW
EOSINOPHIL # BLD AUTO: 0.09 K/UL — SIGNIFICANT CHANGE UP (ref 0–0.7)
EOSINOPHIL NFR BLD AUTO: 1.4 % — SIGNIFICANT CHANGE UP (ref 0–8)
EPI CELLS # UR: PRESENT
ESTIMATED AVERAGE GLUCOSE: 123 MG/DL — HIGH (ref 68–114)
GLUCOSE SERPL-MCNC: 95 MG/DL — SIGNIFICANT CHANGE UP (ref 70–99)
GLUCOSE UR QL: NEGATIVE MG/DL — SIGNIFICANT CHANGE UP
HCT VFR BLD CALC: 45.5 % — SIGNIFICANT CHANGE UP (ref 37–47)
HGB BLD-MCNC: 13.6 G/DL — SIGNIFICANT CHANGE UP (ref 12–16)
HYALINE CASTS # UR AUTO: 2 /LPF — SIGNIFICANT CHANGE UP (ref 0–4)
IMM GRANULOCYTES NFR BLD AUTO: 0.3 % — SIGNIFICANT CHANGE UP (ref 0.1–0.3)
INR BLD: 1.17 RATIO — SIGNIFICANT CHANGE UP (ref 0.65–1.3)
KETONES UR-MCNC: NEGATIVE MG/DL — SIGNIFICANT CHANGE UP
LEUKOCYTE ESTERASE UR-ACNC: ABNORMAL
LYMPHOCYTES # BLD AUTO: 1.29 K/UL — SIGNIFICANT CHANGE UP (ref 1.2–3.4)
LYMPHOCYTES # BLD AUTO: 20.7 % — SIGNIFICANT CHANGE UP (ref 20.5–51.1)
MCHC RBC-ENTMCNC: 29 PG — SIGNIFICANT CHANGE UP (ref 27–31)
MCHC RBC-ENTMCNC: 29.9 G/DL — LOW (ref 32–37)
MCV RBC AUTO: 97 FL — SIGNIFICANT CHANGE UP (ref 81–99)
MONOCYTES # BLD AUTO: 0.48 K/UL — SIGNIFICANT CHANGE UP (ref 0.1–0.6)
MONOCYTES NFR BLD AUTO: 7.7 % — SIGNIFICANT CHANGE UP (ref 1.7–9.3)
MRSA PCR RESULT.: NEGATIVE — SIGNIFICANT CHANGE UP
NEUTROPHILS # BLD AUTO: 4.31 K/UL — SIGNIFICANT CHANGE UP (ref 1.4–6.5)
NEUTROPHILS NFR BLD AUTO: 69.4 % — SIGNIFICANT CHANGE UP (ref 42.2–75.2)
NITRITE UR-MCNC: NEGATIVE — SIGNIFICANT CHANGE UP
NRBC # BLD: 0 /100 WBCS — SIGNIFICANT CHANGE UP (ref 0–0)
NT-PROBNP SERPL-SCNC: 2718 PG/ML — HIGH (ref 0–300)
PH UR: 6 — SIGNIFICANT CHANGE UP (ref 5–8)
PLATELET # BLD AUTO: 198 K/UL — SIGNIFICANT CHANGE UP (ref 130–400)
PMV BLD: 11.8 FL — HIGH (ref 7.4–10.4)
POTASSIUM SERPL-MCNC: 4.8 MMOL/L — SIGNIFICANT CHANGE UP (ref 3.5–5)
POTASSIUM SERPL-SCNC: 4.8 MMOL/L — SIGNIFICANT CHANGE UP (ref 3.5–5)
PROT SERPL-MCNC: 7.3 G/DL — SIGNIFICANT CHANGE UP (ref 6–8)
PROT UR-MCNC: 30 MG/DL
PROTHROM AB SERPL-ACNC: 13.4 SEC — HIGH (ref 9.95–12.87)
RBC # BLD: 4.69 M/UL — SIGNIFICANT CHANGE UP (ref 4.2–5.4)
RBC # FLD: 16.3 % — HIGH (ref 11.5–14.5)
RBC CASTS # UR COMP ASSIST: 1 /HPF — SIGNIFICANT CHANGE UP (ref 0–4)
SODIUM SERPL-SCNC: 142 MMOL/L — SIGNIFICANT CHANGE UP (ref 135–146)
SP GR SPEC: 1.02 — SIGNIFICANT CHANGE UP (ref 1–1.03)
UROBILINOGEN FLD QL: 1 MG/DL — SIGNIFICANT CHANGE UP (ref 0.2–1)
WBC # BLD: 6.22 K/UL — SIGNIFICANT CHANGE UP (ref 4.8–10.8)
WBC # FLD AUTO: 6.22 K/UL — SIGNIFICANT CHANGE UP (ref 4.8–10.8)
WBC UR QL: 7 /HPF — HIGH (ref 0–5)

## 2023-10-04 PROCEDURE — 86850 RBC ANTIBODY SCREEN: CPT

## 2023-10-04 PROCEDURE — 81001 URINALYSIS AUTO W/SCOPE: CPT

## 2023-10-04 PROCEDURE — 36415 COLL VENOUS BLD VENIPUNCTURE: CPT

## 2023-10-04 PROCEDURE — 83880 ASSAY OF NATRIURETIC PEPTIDE: CPT

## 2023-10-04 PROCEDURE — 87641 MR-STAPH DNA AMP PROBE: CPT

## 2023-10-04 PROCEDURE — 71046 X-RAY EXAM CHEST 2 VIEWS: CPT

## 2023-10-04 PROCEDURE — 93005 ELECTROCARDIOGRAM TRACING: CPT

## 2023-10-04 PROCEDURE — 85025 COMPLETE CBC W/AUTO DIFF WBC: CPT

## 2023-10-04 PROCEDURE — 86900 BLOOD TYPING SEROLOGIC ABO: CPT

## 2023-10-04 PROCEDURE — 93010 ELECTROCARDIOGRAM REPORT: CPT

## 2023-10-04 PROCEDURE — 83036 HEMOGLOBIN GLYCOSYLATED A1C: CPT

## 2023-10-04 PROCEDURE — 99214 OFFICE O/P EST MOD 30 MIN: CPT | Mod: 25

## 2023-10-04 PROCEDURE — 80053 COMPREHEN METABOLIC PANEL: CPT

## 2023-10-04 PROCEDURE — 87640 STAPH A DNA AMP PROBE: CPT

## 2023-10-04 PROCEDURE — 71046 X-RAY EXAM CHEST 2 VIEWS: CPT | Mod: 26

## 2023-10-04 PROCEDURE — 86901 BLOOD TYPING SEROLOGIC RH(D): CPT

## 2023-10-04 PROCEDURE — 85610 PROTHROMBIN TIME: CPT

## 2023-10-04 PROCEDURE — 85730 THROMBOPLASTIN TIME PARTIAL: CPT

## 2023-10-04 NOTE — H&P PST ADULT - NSICDXPASTMEDICALHX_GEN_ALL_CORE_FT
PAST MEDICAL HISTORY:  Aortic stenosis     Atrial fibrillation     Former smoker     H/O: obesity     HTN (hypertension)     Hypercholesteremia     Hypothyroid

## 2023-10-04 NOTE — H&P PST ADULT - REASON FOR ADMISSION
Procedure: TAVR  Procedure: 180 Minutes  PT DX WITH MODERATE TO SEVERE AORTIC STENOSIS.   PT STATES--I HAVE A LIGHT PAIN ON THE LEFT SIDE OF MY CHEST.  I HAVE THIS PAIN FOR  2 WEEKS.  THE PAIN COMES AND GOES ON ITS OWN.  THE PAIN IS 4/10.   THE PAIN IS A DULL AND ACHING TYPE.   Anesthesia Type: General

## 2023-10-04 NOTE — H&P PST ADULT - HISTORY OF PRESENT ILLNESS
PT PRESENTS TO PAST WITH NO SOB, CP, PALPITATIONS, DYSURIA, UTI OR URI AT PRESENT.  AS PER THE PT, THIS IS HIS/HER COMPLETE MEDICAL AND SURGICAL HX, INCLUDING MEDICATIONS PRESCRIBED AND OVER THE COUNTER  pt denies any covid s/s, or tested positive in the past  pt advised self quarantine till day of procedure  denies travel outside the [place] in the past 30 days  Anesthesia Alert  NO--Difficult Airway  NO--History of neck surgery or radiation  NO--Limited ROM of neck  NO--History of Malignant hyperthermia  NO--Personal or family history of Pseudocholinesterase deficiency  NO--Prior Anesthesia Complication  NO--Latex Allergy  NO--Loose teeth  NO--History of Rheumatoid Arthritis  YES  --MARIETTA- PT UNCERTAIN  ADVISED FOLLOW UP   NO BLEEDING RISK  NO--Other_____  Duke Activity Status Index (DASI) from [employer]  on 10/4/2023    Duke Activity Status Index (DASI) from [employer]  on 10/4/2023      RESULT SUMMARY:  13.45 points  The higher the score (maximum 58.2), the higher the functional status.    4.40 METs        INPUTS:  Take care of self —> 2.75 = Yes  Walk indoors —> 1.75 = Yes  Walk 1&ndash;2 blocks on level ground —> 2.75 = Yes  Climb a flight of stairs or walk up a hill —> 0 = No  Run a short distance —> 0 = No  Do light work around the house —> 2.7 = Yes  Do moderate work around the house —> 3.5 = Yes  Do heavy work around the house —> 0 = No  Do yardwork —> 0 = No  Have sexual relations —> 0 = No  Participate in moderate recreational activities —> 0 = No  Participate in strenuous sports —> 0 = No  Revised Cardiac Risk Index for Pre-Operative Risk from [employer]  on 10/4/2023      RESULT SUMMARY:  0 points  Class I Risk    3.9 %  30-day risk of death, MI, or cardiac arrest    From [employer] 2017, based on pooled data from 5 high quality external validations (4 prospective). These numbers are higher than those often quoted from the now-outdated original study ([employer] 1999). See Evidence for details.      INPUTS:  Elevated-risk surgery —> 0 = No  History of ischemic heart disease —> 0 = No  History of congestive heart failure —> 0 = No  History of cerebrovascular disease —> 0 = No  Pre-operative treatment with insulin —> 0 = No  Pre-operative creatinine >2 mg/dL / 176.8 µmol/L —> 0 = No

## 2023-10-05 DIAGNOSIS — Z01.818 ENCOUNTER FOR OTHER PREPROCEDURAL EXAMINATION: ICD-10-CM

## 2023-10-05 DIAGNOSIS — I35.0 NONRHEUMATIC AORTIC (VALVE) STENOSIS: ICD-10-CM

## 2023-10-10 VITALS — WEIGHT: 199.3 LBS | HEIGHT: 63 IN

## 2023-10-10 NOTE — PRE-ANESTHESIA EVALUATION ADULT - NSANTHPMHFT_GEN_ALL_CORE
81 yo F w/ PMHx of AS, HTN, A-fib, Hypothyroidism, former smoker scheduled for TAVR.     ECHO 6/23:    1. Normal left ventricular internal cavity size. Normal global left   ventricular systolic function. LV Ejection Fraction by Gaytan's Method   with a biplane EF of 60 %.   2. Normal right ventricular size and function.   3. Mildly enlarged left atrium.   4. Moderate to severe aortic valve stenosis. Mild aortic regurgitation.   5. Mild tricuspid regurgitation.   6. Estimated pulmonary artery systolic pressure is 37.8 mmHg assuming a   right atrial pressure of 8 mmHg, which is consistent with borderline   pulmonary hypertension.

## 2023-10-11 ENCOUNTER — APPOINTMENT (OUTPATIENT)
Dept: CARDIOTHORACIC SURGERY | Facility: HOSPITAL | Age: 83
End: 2023-10-11

## 2023-10-11 ENCOUNTER — TRANSCRIPTION ENCOUNTER (OUTPATIENT)
Age: 83
End: 2023-10-11

## 2023-10-11 ENCOUNTER — INPATIENT (INPATIENT)
Facility: HOSPITAL | Age: 83
LOS: 1 days | Discharge: HOME | DRG: 320 | End: 2023-10-13
Attending: INTERNAL MEDICINE | Admitting: THORACIC SURGERY (CARDIOTHORACIC VASCULAR SURGERY)
Payer: MEDICARE

## 2023-10-11 DIAGNOSIS — Z95.818 PRESENCE OF OTHER CARDIAC IMPLANTS AND GRAFTS: Chronic | ICD-10-CM

## 2023-10-11 DIAGNOSIS — I35.0 NONRHEUMATIC AORTIC (VALVE) STENOSIS: ICD-10-CM

## 2023-10-11 DIAGNOSIS — Z90.49 ACQUIRED ABSENCE OF OTHER SPECIFIED PARTS OF DIGESTIVE TRACT: Chronic | ICD-10-CM

## 2023-10-11 LAB — BLD GP AB SCN SERPL QL: SIGNIFICANT CHANGE UP

## 2023-10-11 PROCEDURE — 93454 CORONARY ARTERY ANGIO S&I: CPT

## 2023-10-11 PROCEDURE — 92986 REVISION OF AORTIC VALVE: CPT

## 2023-10-11 PROCEDURE — 80048 BASIC METABOLIC PNL TOTAL CA: CPT

## 2023-10-11 PROCEDURE — C1874: CPT

## 2023-10-11 PROCEDURE — 86902 BLOOD TYPE ANTIGEN DONOR EA: CPT

## 2023-10-11 PROCEDURE — C9600: CPT | Mod: LM

## 2023-10-11 PROCEDURE — 36415 COLL VENOUS BLD VENIPUNCTURE: CPT

## 2023-10-11 PROCEDURE — 86850 RBC ANTIBODY SCREEN: CPT

## 2023-10-11 PROCEDURE — C1894: CPT

## 2023-10-11 PROCEDURE — 93306 TTE W/DOPPLER COMPLETE: CPT

## 2023-10-11 PROCEDURE — C1889: CPT

## 2023-10-11 PROCEDURE — 92920 PRQ TRLUML C ANGIOP 1ART&/BR: CPT | Mod: RI

## 2023-10-11 PROCEDURE — 85027 COMPLETE CBC AUTOMATED: CPT

## 2023-10-11 PROCEDURE — 93005 ELECTROCARDIOGRAM TRACING: CPT

## 2023-10-11 PROCEDURE — 83735 ASSAY OF MAGNESIUM: CPT

## 2023-10-11 PROCEDURE — 93454 CORONARY ARTERY ANGIO S&I: CPT | Mod: 26

## 2023-10-11 PROCEDURE — 86922 COMPATIBILITY TEST ANTIGLOB: CPT

## 2023-10-11 PROCEDURE — C1769: CPT

## 2023-10-11 PROCEDURE — 86900 BLOOD TYPING SEROLOGIC ABO: CPT

## 2023-10-11 PROCEDURE — 93306 TTE W/DOPPLER COMPLETE: CPT | Mod: 26

## 2023-10-11 PROCEDURE — 92978 ENDOLUMINL IVUS OCT C 1ST: CPT | Mod: LM

## 2023-10-11 PROCEDURE — C1760: CPT

## 2023-10-11 PROCEDURE — C1725: CPT

## 2023-10-11 PROCEDURE — 86901 BLOOD TYPING SEROLOGIC RH(D): CPT

## 2023-10-11 PROCEDURE — C1887: CPT

## 2023-10-11 PROCEDURE — C1753: CPT

## 2023-10-11 RX ORDER — CLOPIDOGREL BISULFATE 75 MG/1
300 TABLET, FILM COATED ORAL ONCE
Refills: 0 | Status: COMPLETED | OUTPATIENT
Start: 2023-10-11 | End: 2023-10-11

## 2023-10-11 RX ORDER — ASPIRIN/CALCIUM CARB/MAGNESIUM 324 MG
81 TABLET ORAL DAILY
Refills: 0 | Status: DISCONTINUED | OUTPATIENT
Start: 2023-10-12 | End: 2023-10-13

## 2023-10-11 RX ORDER — METOPROLOL TARTRATE 50 MG
50 TABLET ORAL
Refills: 0 | Status: DISCONTINUED | OUTPATIENT
Start: 2023-10-11 | End: 2023-10-12

## 2023-10-11 RX ORDER — LEVOTHYROXINE SODIUM 125 MCG
1 TABLET ORAL
Refills: 0 | DISCHARGE

## 2023-10-11 RX ORDER — ASPIRIN/CALCIUM CARB/MAGNESIUM 324 MG
324 TABLET ORAL ONCE
Refills: 0 | Status: COMPLETED | OUTPATIENT
Start: 2023-10-11 | End: 2023-10-11

## 2023-10-11 RX ORDER — LEVOTHYROXINE SODIUM 125 MCG
112 TABLET ORAL DAILY
Refills: 0 | Status: DISCONTINUED | OUTPATIENT
Start: 2023-10-12 | End: 2023-10-13

## 2023-10-11 RX ADMIN — Medication 324 MILLIGRAM(S): at 21:30

## 2023-10-11 RX ADMIN — CLOPIDOGREL BISULFATE 300 MILLIGRAM(S): 75 TABLET, FILM COATED ORAL at 21:30

## 2023-10-11 RX ADMIN — Medication 50 MILLIGRAM(S): at 21:32

## 2023-10-11 NOTE — DISCHARGE NOTE PROVIDER - CARE PROVIDER_API CALL
[facility],   [address] [place] [address], [address]  [place] [address]  Phone: [phone]  Fax: (   )    -  Follow Up Time:     ROSELYN SELBY, MEGGAN KNUTSON  [address] [place] [address]  Phone: [phone]  Fax: ()-  Follow Up Time: 1 month   ROSELYN SELBY, MEGGAN MARTINEZ  [address] [place] [address]  Phone: [phone]  Fax: ()-  Follow Up Time: 1 month    [facility],   [address], [address]  [place] [address]  Phone: [phone]  Fax: (   )    -  Follow Up Time:     Yasir Spaulding  Interventional Cardiology  [address] [place] [address], [address]  [place] [address]  Phone: [phone]  Fax: [phone]  Follow Up Time: 1 week

## 2023-10-11 NOTE — DISCHARGE NOTE PROVIDER - PROVIDER TOKENS
FREE:[LAST:[[facility]],PHONE:[phone]],FAX:[(   )    -],ADDRESS:[[address] [place] [address], [place] [address]]],PROVIDER:[TOKEN:[[id number]:[id number]],FOLLOWUP:[1 month]] PROVIDER:[TOKEN:[[id number]],FOLLOWUP:[1 month]],FREE:[LAST:[[facility]],PHONE:[phone]],FAX:[(   )    -],ADDRESS:[[address] [place] [address]]],PROVIDER:[TOKEN:[[id number]],FOLLOWUP:[1 week]]

## 2023-10-11 NOTE — DISCHARGE NOTE PROVIDER - HOSPITAL COURSE
82 year old woman ex smoker with past medical history of A fib (off anticoagulation due to GIB), HTN, HLD, diverticulosis, non-erosive gastritis, aortic stenosis and hypothyroidism presents for elective TAVR due to progressive dyspnea and fatigue related to AS.  For the past 2 months the patient was feeling short of breath and fatigued. On 10/11/23, she underwent TAVR. Her PO course was          A MCOT monitor was placed on the patient prior to discharge. Patient informed it is used to monitor for potential rhythm disturbances for TAVR for 30 days.  Patient instructed on care and use of device with understanding confirmed by the teach back method.    A discussion was conducted with the patient regarding the importance and benefits of participating in a cardiothoracic rehabilitation program. Contact information  given to the patient. Patient instructed to inquire further upon seeing cardiologist post op. 82 year old woman ex smoker with past medical history of A fib (off anticoagulation due to GIB), HTN, HLD, diverticulosis, non-erosive gastritis, aortic stenosis and hypothyroidism presents for elective TAVR due to progressive dyspnea and fatigue related to AS.  For the past 2 months the patient was feeling short of breath and fatigued. On 10/11/23, she went to the cath lab. Pre-op TTE revealed decreased EF from previous study in June. June cardiac cath did not reveal significant CAD, Decision was made to shoot coronaries prior to inserting valve. Upon coronary injection patient was found to have significant left main coronary artery disease. TAVR canceled and patient recovered in cath lab          82 year old woman ex smoker with past medical history of A fib (off anticoagulation due to GIB), s/p Watchman device insertion, HTN, HLD, diverticulosis, non-erosive gastritis, aortic stenosis and hypothyroidism presents for elective TAVR due to progressive dyspnea and fatigue related to AS.  For the past 2 months the patient was feeling short of breath and fatigued. On 10/11/23, she went to the cath lab. Pre-op TTE revealed decreased EF from previous study in June. June cardiac cath did not reveal significant CAD, Decision was made to shoot coronaries prior to inserting valve. Upon coronary injection patient was found to have significant left main coronary artery disease. TAVR canceled and patient recovered in cath lab. Transferred to cardiology service.         82 year old woman ex smoker with past medical history of A fib (off anticoagulation due to GIB), s/p Watchman device insertion, HTN, HLD, diverticulosis, non-erosive gastritis, aortic stenosis and hypothyroidism presents for elective TAVR due to progressive dyspnea and fatigue related to AS.  For the past 2 months the patient was feeling short of breath and fatigued. On 10/11/23, she went to the cath lab. Pre-op TTE revealed decreased EF from previous study in June. June cardiac cath did not reveal significant CAD, Decision was made to shoot coronaries prior to inserting valve. Upon coronary injection patient was found to have significant left main coronary artery disease. TAVR canceled and patient recovered in cath lab. Transferred to cardiology service.  Patient was admitted to CCU for severe symptomatic aortic stenosis and PCI of left main artery, balloon angioplasty.     CCU 10/12/23- PCI left main and balloon angioplasty planned for today, pt NPO. Loaded with ASA and Plavix.  - PCI successful, remove cordis, monitor A-line and remove in normal pressures, Restart lopressor tonight, repeat ECHO in AM, plan for DC tomorrow      CCU 10/13/23- post procedure ECHO preformed, aortic stenosis shows no improvement EF 45.  Plan DC today     82 year old woman ex smoker with past medical history of A fib (off anticoagulation due to GIB), s/p Watchman device insertion, HTN, HLD, diverticulosis, non-erosive gastritis, aortic stenosis and hypothyroidism presents for elective TAVR due to progressive dyspnea and fatigue related to AS.  For the past 2 months the patient was feeling short of breath and fatigued. On 10/11/23, she went to the cath lab. Pre-op TTE revealed decreased EF from previous study in June. June cardiac cath did not reveal significant CAD, Decision was made to shoot coronaries prior to inserting valve. Upon coronary injection patient was found to have significant left main coronary artery disease. TAVR canceled and patient recovered in cath lab. Transferred to cardiology service.  Patient was admitted to CCU for severe symptomatic aortic stenosis and PCI of left main artery, BAV.     CCU 10/12/23- PCI left main and BAV planned for today, pt NPO. Loaded with ASA and Plavix.  - PCI successful, remove cordis, monitor A-line and remove in normal pressures, Restart lopressor tonight, repeat ECHO in AM, plan for DC tomorrow      CCU 10/13/23- post procedure ECHO preformed, aortic stenosis shows no improvement EF 45.  Plan DC today

## 2023-10-11 NOTE — DISCHARGE NOTE PROVIDER - NSDCCPCAREPLAN_GEN_ALL_CORE_FT
PRINCIPAL DISCHARGE DIAGNOSIS  Diagnosis: Aortic stenosis  Assessment and Plan of Treatment:      PRINCIPAL DISCHARGE DIAGNOSIS  Diagnosis: Aortic stenosis  Assessment and Plan of Treatment: You came into the hospital with shortness of breath and fatigue related to your aortic stenosis. You were found to have severe aortic stenosis and left main artery blockage. You underwent a succesful stent procedure for your left main artery. Please continue taking your medications as prescribed and follow up with your cardiologist.

## 2023-10-11 NOTE — DISCHARGE NOTE PROVIDER - CARE PROVIDERS DIRECT ADDRESSES
,DirectAddress_Unknown,DirectAddress_Unknown ,DirectAddress_Unknown,DirectAddress_Unknown,[email or web address]

## 2023-10-11 NOTE — DISCHARGE NOTE PROVIDER - NSDCFUSCHEDAPPT_GEN_ALL_CORE_FT
Yasir Spaulding  [facility] Physician [facility]  CARDIOLOGY [address]  Scheduled Appointment: 10/19/2023    Jose Alan  [facility] Physician [facility]  ELECTROPH [address]  Scheduled Appointment: 11/16/2023     Yasir Spaulding  [facility] Physician [facility]  CARDIOLOGY [address]  Scheduled Appointment: 10/19/2023    Samuel Cardenas  [facility]  CTSURG HARP [address]  Scheduled Appointment: 11/15/2023    Jose Alan  [facility] Physician [facility] [address]  Scheduled Appointment: 11/16/2023

## 2023-10-11 NOTE — DISCHARGE NOTE PROVIDER - NSDCMRMEDTOKEN_GEN_ALL_CORE_FT
metoprolol tartrate 50 mg oral tablet: 1 tab(s) orally 2 times a day  Synthroid 112 mcg (0.112 mg) oral tablet: 1 tab(s) orally once a day   aspirin 81 mg oral delayed release tablet: 1 tab(s) orally once a day  atorvastatin 40 mg oral tablet: 1 tab(s) orally once a day (at bedtime)  clopidogrel 75 mg oral tablet: 1 tab(s) orally once a day  metoprolol tartrate 50 mg oral tablet: 1 tab(s) orally 2 times a day  Synthroid 112 mcg (0.112 mg) oral tablet: 1 tab(s) orally once a day

## 2023-10-12 LAB
HCT VFR BLD CALC: 35.1 % — LOW (ref 37–47)
HGB BLD-MCNC: 11 G/DL — LOW (ref 12–16)
MCHC RBC-ENTMCNC: 29.3 PG — SIGNIFICANT CHANGE UP (ref 27–31)
MCHC RBC-ENTMCNC: 31.3 G/DL — LOW (ref 32–37)
MCV RBC AUTO: 93.4 FL — SIGNIFICANT CHANGE UP (ref 81–99)
NRBC # BLD: 0 /100 WBCS — SIGNIFICANT CHANGE UP (ref 0–0)
PLATELET # BLD AUTO: 137 K/UL — SIGNIFICANT CHANGE UP (ref 130–400)
PMV BLD: 12 FL — HIGH (ref 7.4–10.4)
RBC # BLD: 3.76 M/UL — LOW (ref 4.2–5.4)
RBC # FLD: 16.2 % — HIGH (ref 11.5–14.5)
WBC # BLD: 4.7 K/UL — LOW (ref 4.8–10.8)
WBC # FLD AUTO: 4.7 K/UL — LOW (ref 4.8–10.8)

## 2023-10-12 PROCEDURE — 93010 ELECTROCARDIOGRAM REPORT: CPT

## 2023-10-12 PROCEDURE — 92928 PRQ TCAT PLMT NTRAC ST 1 LES: CPT | Mod: LM,XP

## 2023-10-12 PROCEDURE — 93306 TTE W/DOPPLER COMPLETE: CPT | Mod: 26

## 2023-10-12 PROCEDURE — 92986 REVISION OF AORTIC VALVE: CPT | Mod: 59

## 2023-10-12 PROCEDURE — 92978 ENDOLUMINL IVUS OCT C 1ST: CPT | Mod: 26,LM,59

## 2023-10-12 RX ORDER — METOPROLOL TARTRATE 50 MG
50 TABLET ORAL
Refills: 0 | Status: DISCONTINUED | OUTPATIENT
Start: 2023-10-12 | End: 2023-10-13

## 2023-10-12 RX ORDER — CHLORHEXIDINE GLUCONATE 213 G/1000ML
1 SOLUTION TOPICAL
Refills: 0 | Status: DISCONTINUED | OUTPATIENT
Start: 2023-10-12 | End: 2023-10-13

## 2023-10-12 RX ORDER — SODIUM CHLORIDE 9 MG/ML
1000 INJECTION INTRAMUSCULAR; INTRAVENOUS; SUBCUTANEOUS
Refills: 0 | Status: DISCONTINUED | OUTPATIENT
Start: 2023-10-12 | End: 2023-10-13

## 2023-10-12 RX ORDER — ALBUMIN HUMAN 25 %
250 VIAL (ML) INTRAVENOUS ONCE
Refills: 0 | Status: DISCONTINUED | OUTPATIENT
Start: 2023-10-12 | End: 2023-10-12

## 2023-10-12 RX ORDER — ATORVASTATIN CALCIUM 80 MG/1
40 TABLET, FILM COATED ORAL AT BEDTIME
Refills: 0 | Status: DISCONTINUED | OUTPATIENT
Start: 2023-10-12 | End: 2023-10-13

## 2023-10-12 RX ORDER — CLOPIDOGREL BISULFATE 75 MG/1
75 TABLET, FILM COATED ORAL DAILY
Refills: 0 | Status: DISCONTINUED | OUTPATIENT
Start: 2023-10-13 | End: 2023-10-13

## 2023-10-12 RX ORDER — CLOPIDOGREL BISULFATE 75 MG/1
300 TABLET, FILM COATED ORAL ONCE
Refills: 0 | Status: COMPLETED | OUTPATIENT
Start: 2023-10-12 | End: 2023-10-12

## 2023-10-12 RX ORDER — SODIUM CHLORIDE 9 MG/ML
250 INJECTION INTRAMUSCULAR; INTRAVENOUS; SUBCUTANEOUS ONCE
Refills: 0 | Status: COMPLETED | OUTPATIENT
Start: 2023-10-12 | End: 2023-10-12

## 2023-10-12 RX ADMIN — Medication 112 MICROGRAM(S): at 05:41

## 2023-10-12 RX ADMIN — CHLORHEXIDINE GLUCONATE 1 APPLICATION(S): 213 SOLUTION TOPICAL at 11:28

## 2023-10-12 RX ADMIN — Medication 50 MILLIGRAM(S): at 05:40

## 2023-10-12 RX ADMIN — CLOPIDOGREL BISULFATE 300 MILLIGRAM(S): 75 TABLET, FILM COATED ORAL at 07:10

## 2023-10-12 RX ADMIN — SODIUM CHLORIDE 100 MILLILITER(S): 9 INJECTION INTRAMUSCULAR; INTRAVENOUS; SUBCUTANEOUS at 11:27

## 2023-10-12 RX ADMIN — Medication 50 MILLIGRAM(S): at 21:03

## 2023-10-12 RX ADMIN — SODIUM CHLORIDE 250 MILLILITER(S): 9 INJECTION INTRAMUSCULAR; INTRAVENOUS; SUBCUTANEOUS at 07:39

## 2023-10-12 RX ADMIN — ATORVASTATIN CALCIUM 40 MILLIGRAM(S): 80 TABLET, FILM COATED ORAL at 21:03

## 2023-10-12 RX ADMIN — Medication 81 MILLIGRAM(S): at 07:07

## 2023-10-12 NOTE — CHART NOTE - NSCHARTNOTEFT_GEN_A_CORE
PACU ANESTHESIA ADMISSION NOTE      Procedure:   Post op diagnosis:      ____  Intubated  TV:______       Rate: ______      FiO2: ______    __x__  Patent Airway    _x___  Full return of protective reflexes    __x__  Full recovery from anesthesia / back to baseline     Vitals:   T:    98       R:     14             BP:   105/54               Sat:    98               P: 78      Mental Status:  __x__ Awake   __x___ Alert   _____ Drowsy   _____ Sedated    Nausea/Vomiting:  _x___ NO  ______Yes,   See Post - Op Orders          Pain Scale (0-10):  __0___    Treatment: ____ None    ___x_ See Post - Op/PCA Orders    Post - Operative Fluids:   ____ Oral   __x__ See Post - Op Orders    Plan: Discharge:   ____Home       _____Floor     __x___Critical Care    _____  Other:_________________    Comments:

## 2023-10-12 NOTE — CHART NOTE - NSCHARTNOTEFT_GEN_A_CORE
PRE-OP DIAGNOSIS:      Severe AS and Distal L main disease   PROCEDURE:     [] Coronary Angiogram     [x] LHC     [] LVG     [] RHC     [] Intervention (see below)         PHYSICIAN:  Dr. Leisa Velez    ASSISTANT:  Dr. Singh       PROCEDURE DESCRIPTION:     Consent:      [x] Patient     [] Family Member     []  Used        Anesthesia:     [x] General     [] Sedation     [] Local        Access & Closure:     [] Fr Radial Artery     [x] 12Fr Femoral Artery , Pre-close     []6 Fr Femoral Vein     [] Fr Brachial Vein       IV Contrast:75 mL        Intervention: PCI of distal L main into p-lad       Implants:    Synergy 4.0 x 16 mm    FINDINGS:       LM: Severe distal L MAIN DISEASE 80 PERCENT S/P PCI with PHONG    LAD:   P-LAD severe disease 80 percent s/p PCI   Mid-Lad 40 PERCENT DISEASE   Distal LAD Mild disease.     CX:   Small vessel. Mild disease   Ramus:   70 percent disease s/p ballon angioplasty   RCA:   Not injected        Aortic Balloon valvuloplasty      ESTIMATED BLOOD LOSS: < 10 mL        CONDITION:     [x] Good     [] Fair     [] Critical        SPECIMEN REMOVED: N/A       POST-OP DIAGNOSIS:      Severe disease of Distal L main and ostial LAD s/p PCI with PHONG.  Severe AS s/p successful Aortic Balloon valvuloplasty .  Peak to peak gradient before valvuloplasty 25 mmhg, after 21 mmHg.  No complications.     PLAN OF CARE:     [] D/C Home Today     [x] Return to In-patient bed     [] Admit for observation     [] Return for Staged Procedure     [] CT Surgery Consult     [] Medications: Asa, Plavix    [] IV Fluids: NS at 100 ml/hr for 6 hours. PRE-OP DIAGNOSIS:      Severe AS and Distal L main disease   PROCEDURE:     [] Coronary Angiogram     [x] LHC     [] LVG     [] RHC     [] Intervention (see below)         PHYSICIAN:  Dr. Leisa Velez    ASSISTANT:  Dr. Singh       PROCEDURE DESCRIPTION:     Consent:      [x] Patient     [] Family Member     []  Used        Anesthesia:     [x] General     [] Sedation     [] Local        Access & Closure:     [] Fr Radial Artery     [x] 12Fr Femoral Artery , Pre-close     []6 Fr Femoral Vein     [] Fr Brachial Vein       IV Contrast:75 mL        Intervention: PCI of distal L main into p-lad       Implants:    Synergy 4.0 x 16 mm    FINDINGS:       LM: Severe distal L MAIN DISEASE 80 PERCENT S/P PCI with PHONG    LAD:   P-LAD severe disease 80 percent s/p PCI   Mid-Lad 40 PERCENT DISEASE   Distal LAD Mild disease.     CX:   Small vessel. Mild disease   Ramus:   70 percent disease s/p ballon angioplasty   RCA:   Not injected        Aortic Balloon valvuloplasty      ESTIMATED BLOOD LOSS: < 10 mL        CONDITION:     [x] Good     [] Fair     [] Critical        SPECIMEN REMOVED: N/A       POST-OP DIAGNOSIS:      Severe disease of Distal L main and ostial LAD s/p PCI with PHONG.  Severe AS s/p successful Aortic Balloon valvuloplasty .  Peak to peak gradient before valvuloplasty 25 mmhg, after 21 mmHg.  No complications.     PLAN OF CARE:     [] D/C Home Today     [x] Return to In-patient bed     [] Admit for observation     [] Return for Staged Procedure     [] CT Surgery Consult     [] Medications: Asa, Plavix    [] IV Fluids: NS at 100 ml/hr for 6 hours.    SEE CATH REPORT FOR ATTENDING IMPRESSION

## 2023-10-12 NOTE — PROGRESS NOTE ADULT - SUBJECTIVE AND OBJECTIVE BOX
CHIEF COMPLAINT:  Patient is a 82y old  Female who presents with a chief complaint of Aortic Stenosis for elective TAVR (12 Oct 2023 01:17)      INTERVAL HISTORY/OVERNIGHT EVENTS:  82 year old woman ex smoker with past medical history of A fib (off anticoagulation due to GIB), s/p Watchman device insertion, HTN, HLD, diverticulosis,  aortic stenosis and hypothyroidism presents for elective TAVR due to progressive dyspnea and fatigue related to AS.  For the past 2 months the patient was feeling short of breath and fatigued. On 10/11/23, she went to the cath lab. Pre-op TTE revealed decreased EF 46% from previous study in June. June cardiac cath did not reveal significant CAD, Decision was made to shoot coronaries prior to inserting valve. Upon coronary injection patient was found to have significant left main coronary artery disease. TAVR canceled and patient recovered in cath lab. Transferred to cardiology service for PCI of left main artery, balloon angioplasty. Loaded with ASA/plavix and started on maintenance ASA. Otherwise denies chest pain, N/V, lightheadedness, dizziness.     CCU 10/12/23    ======================  MEDICATIONS:  chlorhexidine 2% Cloths 1 Application(s) Topical <User Schedule>  metoprolol tartrate 50 milliGRAM(s) Oral two times a day    DRIPS:    PRN:       ======================  PHYSICAL EXAMINATION:  GEN:  nad.   HEENT:  eomi. ncat  PULM:  b/l lung sounds   CARD: s1, s2  ABD: +bs. ntnd  EXT:  no new rashes.    NEURO:  no new focal deficits.   ======================  OBJECTIVE:        VS:  T(F): 96.8 (10-12 @ 00:00), Max: 97.1 (10-11 @ 20:00)  HR: 72 (10-12 @ 05:00) (70 - 84)  BP: 109/55 (10-12 @ 05:00) (95/50 - 134/63)  RR: 23 (10-12 @ 05:00) (17 - 37)  SpO2: 91% (10-12 @ 05:00) (91% - 98%)  CVP(mm Hg): --  CO: --  CI: --  PA: --  PCWP: --    I/O:        Weight trend:  Weight (kg): 93.3 (10-11)    ======================    LABS:                          11.0   4.70  )-----------( 137      ( 12 Oct 2023 04:52 )             35.1                           Cultures:         CHIEF COMPLAINT:  Patient is a 82y old  Female who presents with a chief complaint of Aortic Stenosis for elective TAVR (12 Oct 2023 01:17)      INTERVAL HISTORY/OVERNIGHT EVENTS:  82 year old woman ex smoker with past medical history of A fib (off anticoagulation due to GIB), s/p Watchman device insertion, HTN, HLD, diverticulosis,  aortic stenosis and hypothyroidism presents for elective TAVR due to progressive dyspnea and fatigue related to AS.  For the past 2 months the patient was feeling short of breath and fatigued. On 10/11/23, she went to the cath lab. Pre-op TTE revealed decreased EF 46% from previous study in June. June cardiac cath did not reveal significant CAD, Decision was made to shoot coronaries prior to inserting valve. Upon coronary injection patient was found to have significant left main coronary artery disease. TAVR canceled and patient recovered in cath lab. Transferred to cardiology service for PCI of left main artery, balloon angioplasty. Loaded with ASA/plavix and started on maintenance ASA. Otherwise denies chest pain, N/V, lightheadedness, dizziness.     Overnight events:     CCU 10/12/23- PCI left main and balloon angioplasty planned for today, pt NPO. Loaded with ASA and Plavix. f/u if maintenance Plavix needed.     Subjective: Pt seen and examined at bedside.         ======================  MEDICATIONS:  chlorhexidine 2% Cloths 1 Application(s) Topical <User Schedule>  metoprolol tartrate 50 milliGRAM(s) Oral two times a day    DRIPS:    PRN:       ======================  PHYSICAL EXAMINATION:  GEN:  nad.   HEENT:  eomi. ncat  PULM:  b/l lung sounds   CARD: s1, s2  ABD: +bs. ntnd  EXT:  no new rashes.    NEURO:  no new focal deficits.   ======================  OBJECTIVE:        VS:  T(F): 96.8 (10-12 @ 00:00), Max: 97.1 (10-11 @ 20:00)  HR: 72 (10-12 @ 05:00) (70 - 84)  BP: 109/55 (10-12 @ 05:00) (95/50 - 134/63)  RR: 23 (10-12 @ 05:00) (17 - 37)  SpO2: 91% (10-12 @ 05:00) (91% - 98%)  CVP(mm Hg): --  CO: --  CI: --  PA: --  PCWP: --    I/O:        Weight trend:  Weight (kg): 93.3 (10-11)    ======================    LABS:                          11.0   4.70  )-----------( 137      ( 12 Oct 2023 04:52 )             35.1                           Cultures:         CHIEF COMPLAINT:  Patient is a 82y old  Female who presents with a chief complaint of Aortic Stenosis for elective TAVR (12 Oct 2023 01:17)      INTERVAL HISTORY/OVERNIGHT EVENTS:  82 year old woman ex smoker with past medical history of A fib (off anticoagulation due to GIB), s/p Watchman device insertion, HTN, HLD, diverticulosis,  aortic stenosis and hypothyroidism presents for elective TAVR due to progressive dyspnea and fatigue related to AS.  For the past 2 months the patient was feeling short of breath and fatigued. On 10/11/23, she went to the cath lab. Pre-op TTE revealed decreased EF 46% from previous study in June. June cardiac cath did not reveal significant CAD, Decision was made to shoot coronaries prior to inserting valve. Upon coronary injection patient was found to have significant left main coronary artery disease. TAVR canceled and patient recovered in cath lab. Transferred to cardiology service for PCI of left main artery, balloon angioplasty. Loaded with ASA/plavix and started on maintenance ASA.    Overnight events: none     CCU 10/12/23- PCI left main and balloon angioplasty planned for today, pt NPO. Loaded with ASA and Plavix.  - PCI successful, remove cordis, monitor A-line and remove in normal pressures, Restart lopressor tonight, repeat ECHO in AM, plan for DC tomorrow      Subjective: Pt seen and examined at bedside.         ======================  MEDICATIONS:  chlorhexidine 2% Cloths 1 Application(s) Topical <User Schedule>  metoprolol tartrate 50 milliGRAM(s) Oral two times a day    DRIPS:    PRN:       ======================  PHYSICAL EXAMINATION:  GEN:  nad.   HEENT:  eomi. ncat  PULM:  b/l lung sounds   CARD: s1, s2  ABD: +bs. ntnd  EXT:  no new rashes.    NEURO:  no new focal deficits.   ======================  OBJECTIVE:        VS:  T(F): 96.8 (10-12 @ 00:00), Max: 97.1 (10-11 @ 20:00)  HR: 72 (10-12 @ 05:00) (70 - 84)  BP: 109/55 (10-12 @ 05:00) (95/50 - 134/63)  RR: 23 (10-12 @ 05:00) (17 - 37)  SpO2: 91% (10-12 @ 05:00) (91% - 98%)  CVP(mm Hg): --  CO: --  CI: --  PA: --  PCWP: --    I/O:        Weight trend:  Weight (kg): 93.3 (10-11)    ======================    LABS:                          11.0   4.70  )-----------( 137      ( 12 Oct 2023 04:52 )             35.1                           Cultures:

## 2023-10-12 NOTE — H&P ADULT - NSHPPHYSICALEXAM_GEN_ALL_CORE
GENERAL: NAD, resting comfortably in chair  HEENT: Normocephalic, atraumatic  PULMONARY: Clear to auscultation bilaterally. No rales, ronchi, or wheezing.   CARDIOVASCULAR: Regular rate and rhythm, S1-S2, no murmurs   GASTROINTESTINAL: Soft, non-tender, non-distended, no guarding.   SKIN/EXTREMITIES: No LE edema b/l  NEUROLOGIC: AAOX3

## 2023-10-12 NOTE — CHART NOTE - NSCHARTNOTEFT_GEN_A_CORE
PREOPERATIVE DAY OF PROCEDURE EVALUATION:  I have personally seen and examined the patient.  I agree with the history and physical which I have reviewed and noted any changes below.     82 year old F, former smoker, with PMHx of A fib (off anticoagulation due to GIB), s/p Watchman device insertion, HTN, HLD, diverticulosis, aortic stenosis, hypothyroidism, who presented initially on 10/11 for elective TAVR due to progressive dyspnea and fatigue related to AS.  For the past 2 months the patient was feeling short of breath and fatigued. On 10/11/23, she went to the cath lab.  Pre-op TTE revealed decreased EF 46% from previous study in June. Cardiac cath in June did not reveal significant CAD.  Decision was made to shoot coronaries prior to inserting valve. Upon coronary injection patient was found to have significant left main coronary artery disease. TAVR was canceled and patient recovered in cath lab. Transferred to cardiology service for PCI of left main artery.  Pt now presents for BAV and PCI.    Bleeding Risk Score:   4.4%  IVF pre-hydration: 250cc NS bolus (clinically euvolemic, Ef 46%, severe AS)  -loaded with ASA 324mg yesterday, and 81mg po x 1 today  -loaded with Plavix 300mg po x 1 yesterday, and another 300mg po x 1 now   -on BB    VS: 137/88, 74, 16, 97% on RA  Left Radial a-line in place       (Signed electronically by __________)  10-12-23 @ 07:06 PREOPERATIVE DAY OF PROCEDURE EVALUATION:  I have personally seen and examined the patient.  I agree with the history and physical which I have reviewed and noted any changes below.     82 year old F, former smoker, with PMHx of A fib (off anticoagulation due to GIB), s/p Watchman device insertion, HTN, HLD, diverticulosis, aortic stenosis, hypothyroidism, who presented initially on 10/11 for elective TAVR due to progressive dyspnea and fatigue related to AS.  For the past 2 months the patient was feeling short of breath and fatigued. On 10/11/23, she went to the cath lab.  Pre-op TTE revealed decreased EF 46% from previous study in June. Cardiac cath in June did not reveal significant CAD.  Decision was made to shoot coronaries prior to inserting valve. Upon coronary injection patient was found to have significant left main coronary artery disease. TAVR was canceled and patient recovered in cath lab. Transferred to cardiology service for PCI of left main artery.  Pt now presents for BAV and PCI.    TTE 10/11/23:  Summary:   1. Mildly decreasedglobal left ventricular systolic function.   2. RCA distribution and basal and mid inferolateral wall are abnormal as   described above.   3. LV Ejection Fraction by Gaytan's Method with a biplane EF of 46 %.   4. The left ventricular diastolic function could not be assessed in this   study.   5. Mildly enlarged right ventricle.   6. Mildly reduced RV systolic function.   7. Mildly enlarged left atrium.   8. Mildly enlarged right atrium.   9. Mild thickening of the anterior and posterior mitral valve leaflets.  10. Moderate mitral valve regurgitation.  11. Moderate tricuspid regurgitation.  12. Aortic valve thickening with decreased leaflet opening.  13. Mild aortic regurgitation.  14. Critical aortic stenosis, peak/mean PG 40/25 mmHg, [place] 0.6 cm^2.  15. Estimated pulmonary artery systolic pressure is 49.0 mmHg assuming a   right atrial pressure of 8 mmHg, which is consistent with mild pulmonary   hypertension.    Bleeding Risk Score:   4.4%  IVF pre-hydration: 250cc NS bolus (clinically euvolemic, Ef 46%, severe AS)  -loaded with ASA 324mg yesterday, and 81mg po x 1 today  -loaded with Plavix 300mg po x 1 yesterday, and another 300mg po x 1 now   -on BB    VS: 137/88, 74, 16, 97% on RA  Left Radial a-line in place       (Signed electronically by __________)  10-12-23 @ 07:06

## 2023-10-12 NOTE — H&P ADULT - HISTORY OF PRESENT ILLNESS
82 year old woman ex smoker with past medical history of A fib (off anticoagulation due to GIB), s/p Watchman device insertion, HTN, HLD, diverticulosis, non-erosive gastritis, aortic stenosis and hypothyroidism presents for elective TAVR due to progressive dyspnea and fatigue related to AS.  For the past 2 months the patient was feeling short of breath and fatigued. On 10/11/23, she went to the cath lab. Pre-op TTE revealed decreased EF 46% from previous study in June. June cardiac cath did not reveal significant CAD, Decision was made to shoot coronaries prior to inserting valve. Upon coronary injection patient was found to have significant left main coronary artery disease. TAVR canceled and patient recovered in cath lab. Transferred to cardiology service for PCI of left main artery, balloon angioplasty   82 year old woman ex smoker with past medical history of A fib (off anticoagulation due to GIB), s/p Watchman device insertion, HTN, HLD, diverticulosis,  aortic stenosis and hypothyroidism presents for elective TAVR due to progressive dyspnea and fatigue related to AS.  For the past 2 months the patient was feeling short of breath and fatigued. On 10/11/23, she went to the cath lab. Pre-op TTE revealed decreased EF 46% from previous study in June. June cardiac cath did not reveal significant CAD, Decision was made to shoot coronaries prior to inserting valve. Upon coronary injection patient was found to have significant left main coronary artery disease. TAVR canceled and patient recovered in cath lab. Transferred to cardiology service for PCI of left main artery, balloon angioplasty. Loaded with ASA/plavix and started on maintenance ASA. Otherwise denies chest pain, N/V, lightheadedness, dizziness.

## 2023-10-12 NOTE — CHART NOTE - NSCHARTNOTESELECT_GEN_ALL_CORE
Stable  Recommend lifestyle modifications 
Transfer Note
pre-cath/Event Note
Cath report-Brief/Event Note

## 2023-10-12 NOTE — H&P ADULT - ASSESSMENT
IMPRESSION:    Significant left main disease, degree undetermined; planned PCI left main and balloon angioplasty 10/12  Critical aortic stenosis with planned TAVR, cancelled  A fib (off anticoagulation due to GIB), s/p Watchman device insertion  HTN/HLD  Diverticulosis    PLAN:    CNS: avoid oversedation    HEENT: Oral care    PULMONARY:  HOB @ 45 degrees    CARDIOVASCULAR: s/p ASA/plavix load; continue ASA; not on home statin; planned PCI left main and balloon angioplasty 10/12; NPO    GI: NPO    RENAL:  Follow up lytes.  Correct as needed    INFECTIOUS DISEASE: Follow up cultures    HEMATOLOGICAL:  DVT ppx with SCD    ENDOCRINE:  Follow up FS.  Insulin protocol if needed.    MUSCULOSKELETAL: OOBT

## 2023-10-12 NOTE — PROGRESS NOTE ADULT - ASSESSMENT
IMPRESSION:    Significant left main disease, degree undetermined; planned PCI left main and balloon angioplasty 10/12  Critical aortic stenosis with planned TAVR, cancelled  A fib (off anticoagulation due to GIB), s/p Watchman device insertion  HTN/HLD  Diverticulosis    PLAN:    CNS: avoid oversedation    HEENT: Oral care    PULMONARY:  HOB @ 45 degrees    CARDIOVASCULAR: s/p ASA/plavix load; continue ASA; not on home statin; planned PCI left main and balloon angioplasty 10/12; NPO    GI: NPO    RENAL:  Follow up lytes.  Correct as needed    INFECTIOUS DISEASE: Follow up cultures    HEMATOLOGICAL:  DVT ppx with SCD    ENDOCRINE:  Follow up FS.  Insulin protocol if needed.    MUSCULOSKELETAL: OOBT   IMPRESSION:    Significant left main disease, degree undetermined; planned PCI left main and balloon angioplasty 10/12  Critical aortic stenosis with planned TAVR, cancelled  A fib (off anticoagulation due to GIB), s/p Watchman device insertion  HTN/HLD  Diverticulosis    PLAN:    CNS: avoid oversedation    HEENT: Oral care    PULMONARY:  HOB @ 45 degrees    CARDIOVASCULAR: s/p ASA/plavix load; continue ASA; not on home statin; s/p PCI left main and balloon angioplasty 10/12;     GI: Dash diet    RENAL:  Follow up lytes.  Correct as needed    INFECTIOUS DISEASE: Follow up cultures    HEMATOLOGICAL:  DVT ppx with SCD    ENDOCRINE:  Follow up FS.  Insulin protocol if needed.    MUSCULOSKELETAL: OOBT     Significant left main disease, degree undetermined; planned PCI left main and balloon angioplasty 10/12  Severe ortic stenosis with planned TAVR 10/11, cancelled due to finding of severe LM dz  A fib (off anticoagulation due to GIB), s/p Watchman device insertion  HTN/HLD  Diverticulosis    PLAN:    CNS: avoid oversedation    HEENT: Oral care    PULMONARY:  HOB @ 45 degrees    CARDIOVASCULAR: s/p ASA/plavix load; continue ASA; not on home statin; s/p PCI left main and BAV with no improvement in gradient 25 - 21    GI: Dash diet    RENAL:  Follow up lytes.  Correct as needed    INFECTIOUS DISEASE: Follow up cultures    HEMATOLOGICAL:  DVT ppx with SCD    ENDOCRINE:  Follow up FS.  Insulin protocol if needed.    MUSCULOSKELETAL: OOBT

## 2023-10-13 ENCOUNTER — TRANSCRIPTION ENCOUNTER (OUTPATIENT)
Age: 83
End: 2023-10-13

## 2023-10-13 VITALS — TEMPERATURE: 98 F | RESPIRATION RATE: 32 BRPM | OXYGEN SATURATION: 92 % | HEART RATE: 86 BPM

## 2023-10-13 LAB
ANION GAP SERPL CALC-SCNC: 10 MMOL/L — SIGNIFICANT CHANGE UP (ref 7–14)
BUN SERPL-MCNC: 13 MG/DL — SIGNIFICANT CHANGE UP (ref 10–20)
CALCIUM SERPL-MCNC: 8.6 MG/DL — SIGNIFICANT CHANGE UP (ref 8.4–10.5)
CHLORIDE SERPL-SCNC: 104 MMOL/L — SIGNIFICANT CHANGE UP (ref 98–110)
CO2 SERPL-SCNC: 27 MMOL/L — SIGNIFICANT CHANGE UP (ref 17–32)
CREAT SERPL-MCNC: 0.6 MG/DL — LOW (ref 0.7–1.5)
EGFR: 89 ML/MIN/1.73M2 — SIGNIFICANT CHANGE UP
GLUCOSE SERPL-MCNC: 123 MG/DL — HIGH (ref 70–99)
HCT VFR BLD CALC: 40.7 % — SIGNIFICANT CHANGE UP (ref 37–47)
HGB BLD-MCNC: 12.3 G/DL — SIGNIFICANT CHANGE UP (ref 12–16)
MAGNESIUM SERPL-MCNC: 2.1 MG/DL — SIGNIFICANT CHANGE UP (ref 1.8–2.4)
MCHC RBC-ENTMCNC: 29.1 PG — SIGNIFICANT CHANGE UP (ref 27–31)
MCHC RBC-ENTMCNC: 30.2 G/DL — LOW (ref 32–37)
MCV RBC AUTO: 96.2 FL — SIGNIFICANT CHANGE UP (ref 81–99)
NRBC # BLD: 0 /100 WBCS — SIGNIFICANT CHANGE UP (ref 0–0)
PLATELET # BLD AUTO: 131 K/UL — SIGNIFICANT CHANGE UP (ref 130–400)
PMV BLD: 11.7 FL — HIGH (ref 7.4–10.4)
POTASSIUM SERPL-MCNC: 4.2 MMOL/L — SIGNIFICANT CHANGE UP (ref 3.5–5)
POTASSIUM SERPL-SCNC: 4.2 MMOL/L — SIGNIFICANT CHANGE UP (ref 3.5–5)
RBC # BLD: 4.23 M/UL — SIGNIFICANT CHANGE UP (ref 4.2–5.4)
RBC # FLD: 16.3 % — HIGH (ref 11.5–14.5)
SODIUM SERPL-SCNC: 141 MMOL/L — SIGNIFICANT CHANGE UP (ref 135–146)
WBC # BLD: 7.68 K/UL — SIGNIFICANT CHANGE UP (ref 4.8–10.8)
WBC # FLD AUTO: 7.68 K/UL — SIGNIFICANT CHANGE UP (ref 4.8–10.8)

## 2023-10-13 PROCEDURE — 99232 SBSQ HOSP IP/OBS MODERATE 35: CPT | Mod: 1L

## 2023-10-13 PROCEDURE — 93010 ELECTROCARDIOGRAM REPORT: CPT

## 2023-10-13 PROCEDURE — 99231 SBSQ HOSP IP/OBS SF/LOW 25: CPT | Mod: 24

## 2023-10-13 RX ORDER — ATORVASTATIN CALCIUM 80 MG/1
1 TABLET, FILM COATED ORAL
Qty: 30 | Refills: 0
Start: 2023-10-13 | End: 2023-11-11

## 2023-10-13 RX ORDER — LANOLIN ALCOHOL/MO/W.PET/CERES
3 CREAM (GRAM) TOPICAL AT BEDTIME
Refills: 0 | Status: DISCONTINUED | OUTPATIENT
Start: 2023-10-13 | End: 2023-10-13

## 2023-10-13 RX ORDER — LANOLIN ALCOHOL/MO/W.PET/CERES
3 CREAM (GRAM) TOPICAL ONCE
Refills: 0 | Status: COMPLETED | OUTPATIENT
Start: 2023-10-13 | End: 2023-10-13

## 2023-10-13 RX ORDER — CLOPIDOGREL BISULFATE 75 MG/1
1 TABLET, FILM COATED ORAL
Qty: 30 | Refills: 0
Start: 2023-10-13 | End: 2023-11-11

## 2023-10-13 RX ORDER — ASPIRIN/CALCIUM CARB/MAGNESIUM 324 MG
1 TABLET ORAL
Qty: 30 | Refills: 0
Start: 2023-10-13 | End: 2023-11-11

## 2023-10-13 RX ADMIN — Medication 3 MILLIGRAM(S): at 02:36

## 2023-10-13 RX ADMIN — Medication 112 MICROGRAM(S): at 06:18

## 2023-10-13 RX ADMIN — CLOPIDOGREL BISULFATE 75 MILLIGRAM(S): 75 TABLET, FILM COATED ORAL at 11:14

## 2023-10-13 RX ADMIN — CHLORHEXIDINE GLUCONATE 1 APPLICATION(S): 213 SOLUTION TOPICAL at 06:17

## 2023-10-13 RX ADMIN — Medication 50 MILLIGRAM(S): at 06:17

## 2023-10-13 RX ADMIN — Medication 81 MILLIGRAM(S): at 11:14

## 2023-10-13 NOTE — PROGRESS NOTE ADULT - REASON FOR ADMISSION
Aortic Stenosis for elective TAVR

## 2023-10-13 NOTE — PROGRESS NOTE ADULT - SUBJECTIVE AND OBJECTIVE BOX
Cardiology Follow up s/p PCI    ASAD MENDOSA   82y Female  PAST MEDICAL & SURGICAL HISTORY:    Hypercholesteremia      Hypothyroid      Atrial fibrillation      HTN (hypertension)      Former smoker      Aortic stenosis      H/O: obesity      S/P appendectomy      Presence of Watchman left atrial appendage closure device      Presence of Amulet left atrial appendage closure device           HPI:  82 year old woman ex smoker with past medical history of A fib (off anticoagulation due to GIB), s/p Watchman device insertion, HTN, HLD, diverticulosis,  aortic stenosis and hypothyroidism presents for elective TAVR due to progressive dyspnea and fatigue related to AS.  For the past 2 months the patient was feeling short of breath and fatigued. On 10/11/23, she went to the cath lab. Pre-op TTE revealed decreased EF 46% from previous study in June. June cardiac cath did not reveal significant CAD, Decision was made to shoot coronaries prior to inserting valve. Upon coronary injection patient was found to have significant left main coronary artery disease. TAVR canceled and patient recovered in cath lab. Transferred to cardiology service for PCI of left main artery, balloon angioplasty. Loaded with ASA/plavix and started on maintenance ASA. Otherwise denies chest pain, N/V, lightheadedness, dizziness.    (12 Oct 2023 01:17)    Allergies    penicillins (Rash)    Intolerances    Patient seen and examined at bedside. No acute events overnight.  Patient without complaints. Pt ambulated without issues/symptoms OOB to Chair  Denies CP, SOB, palpitations, or dizziness  No events on telemetry overnight    Vital Signs Last 24 Hrs  T(C): 36 (13 Oct 2023 08:00), Max: 36.4 (12 Oct 2023 19:00)  T(F): 96.8 (13 Oct 2023 08:00), Max: 97.6 (12 Oct 2023 19:00)  HR: 78 (13 Oct 2023 10:00) (65 - 93)  BP: 113/55 (13 Oct 2023 10:00) (107/64 - 161/111)  BP(mean): 78 (13 Oct 2023 10:00) (76 - 129)  RR: 36 (13 Oct 2023 10:00) (20 - 42)  SpO2: 95% (13 Oct 2023 10:00) (93% - 100%)    Parameters below as of 13 Oct 2023 10:00  Patient On (Oxygen Delivery Method): room air      MEDICATIONS  (STANDING):  aspirin enteric coated 81 milliGRAM(s) Oral daily  atorvastatin 40 milliGRAM(s) Oral at bedtime  chlorhexidine 2% Cloths 1 Application(s) Topical <User Schedule>  clopidogrel Tablet 75 milliGRAM(s) Oral daily  levothyroxine 112 MICROGram(s) Oral daily  metoprolol tartrate 50 milliGRAM(s) Oral two times a day  sodium chloride 0.9%. 1000 milliLiter(s) (100 mL/Hr) IV Continuous <Continuous>    MEDICATIONS  (PRN):      REVIEW OF SYSTEMS:          All negative except as mentioned in HPI    PHYSICAL EXAM:           CONSTITUTIONAL: Well-developed; well-nourished; in no acute distress  	SKIN: warm, dry  	HEAD: Normocephalic; atraumatic  	EYES: PERRL.  	ENT: No nasal discharge, airway clear, mucous membranes moist  	NECK: Supple; non tender.  	CARD: +S1, +S2, no murmurs, gallops, or rubs. irregular rate and rhythm    	RESP: No wheezes, rales or rhonchi. CTA B/L  	ABD: soft ntnd, + BS x 4 quadrants  	EXT: moves all extremities,  no clubbing, cyanosis or edema  	NEURO: Alert and oriented x3, no focal deficits          PSYCH: Cooperative, appropriate          VASCULAR:  + Rad / + PTs / +  DPs          EXTREMITY:             Right Groin: dressing removed, access site soft, no hematoma, no pain, + pulses, no sign of infection, no numbness             Left Radial: dressing removed, access site soft, no hematoma, no pain, + pulses, no sign of infection, no numbness            ECG:   < from: 12 Lead ECG (10.13.23 @ 06:33) >  Ventricular Rate 77 BPM    Atrial Rate 66 BPM    QRS Duration 102 ms    Q-T Interval 408 ms    QTC Calculation(Bazett) 461 ms    R Axis -66 degrees    T Axis 0 degrees    Diagnosis Line Atrial fibrillation  Left axis deviation  Anterolateral infarct , age undetermined  Abnormal ECG    Confirmed by KELSEY PALOMINO MD ([id number]) on 10/13/2023 7:05:50 AM                                                                             2D ECHO:  < from: TTE Echo Complete w/ Contrast w/ Doppler (10.12.23 @ 13:18) >  Summary:   1. Mildly decreased global left ventricular systolic function.   2. Moderate to severe right atrial enlargement.   3. LV Ejection Fraction by Gaytan's Method with a biplane EF of 45 %.   4. Moderate left ventricular hypertrophy.   5. Moderately enlarged right ventricle.   6. Moderately reduced RV systolic function.   7. Moderately enlarged left atrium.   8. Degenerative mitral valve.   9. Moderate mitral valve regurgitation.  10. Thickening of the anterior and posterior mitral valve leaflets.  11. Moderate-severe tricuspid regurgitation.  12. Mild aortic regurgitation.  13. Moderate to severe aortic valve stenosis.  14. Mild pulmonic valve regurgitation.  15. Estimated pulmonary artery systolic pressure is 55.4 mmHg assuming a   right atrial pressure of 10 mmHg, which is consistent with moderate   pulmonary hypertension.  16. Endocardial visualization was enhanced with intravenous echo contrast.    LABS:                        12.3   7.68  )-----------( 131      ( 13 Oct 2023 05:20 )             40.7     10-13    141  |  104  |  13  ----------------------------<  123<H>  4.2   |  27  |  0.6<L>    Ca    8.6      13 Oct 2023 05:20  Mg     2.1     10-13    Magnesium: 2.1 mg/dL [1.8 - 2.4] (10-13-23 @ 05:20)      A/P:  I discussed the case with Cardiologist Dr. Spaulding and recommend the following:  S/P PCI:  Access & Closure:     [x] 12Fr Femoral Artery , Pre-close     IV Contrast:75 mL      Intervention: PCI of distal L main into p-lad     Implants:    Synergy 4.0 x 16 mm    FINDINGS:     LM: Severe distal L MAIN DISEASE 80 PERCENT S/P PCI with PHONG    LAD:   P-LAD severe disease 80 percent s/p PCI   Mid-Lad 40 PERCENT DISEASE   Distal LAD Mild disease.     CX:   Small vessel. Mild disease   Ramus:   70 percent disease s/p ballon angioplasty   RCA:   Not injected     Aortic Balloon valvuloplasty                         Care as per CCU team                    Ambulate patient around the unit with assistance, monitor access sites                    No ACEi/ARB due to Soft B/P, will reassess as OP                     A - Fib (off anticoagulation due to GIB), s/p Watchman device insertion  	     Continue DAPT (  Aspirin 81 mg daily and Plavix 75 mg daily ), B-Blocker, Statin Therapy                   Patient given 30 day supply of ( Aspirin 81 mg daily and Plavix 75 mg daily ) to take at home                   Patient agreeing to take DAPT for at least one year or as directed by cardiologist                    Pt given instructions on importance of taking antiplatelet medication or risk acute stent thrombosis/death                   Post cath instructions, access site care and activity restrictions reviewed with patient                     Discussed with patient to return to hospital if experience chest pain, shortness breath, dizziness and site bleeding                   Aggressive risk factor modification, diet counseling, smoking cessation discussed with patient                       Benefits of Cardiac Rehab discussed with patient, All documents [facility]. Patient instructed to call and make first                               appointment after first f/u visit with Cardiologist                    Follow up with Cardiology Dr. Spaulding in two weeks. Instructed to call and make an appointment                                       Suture Removal: 12 days

## 2023-10-13 NOTE — PROGRESS NOTE ADULT - ASSESSMENT
Significant left main disease, degree undetermined; planned PCI left main and balloon angioplasty done 10/12  Severe ortic stenosis with planned TAVR 10/11, cancelled due to finding of severe LM dz  A fib (off anticoagulation due to GIB), s/p Watchman device insertion  HTN/HLD  Diverticulosis    PLAN:    CNS: avoid oversedation    HEENT: Oral care    PULMONARY:  HOB @ 45 degrees    CARDIOVASCULAR: s/p ASA/plavix load; continue ASA; not on home statin; s/p PCI left main and BAV with no improvement in gradient 25 - 21    GI: Dash diet    RENAL:  Follow up lytes.  Correct as needed    INFECTIOUS DISEASE: Follow up cultures    HEMATOLOGICAL:  DVT ppx with SCD    ENDOCRINE:  Follow up FS.  Insulin protocol if needed.    MUSCULOSKELETAL: OOBT   Significant left main disease, degree undetermined; s/p PCI left main and BAV done 10/12  Severe Aortic stenosis with planned TAVR 10/11, cancelled due to finding of severe LM dz  A fib (off anticoagulation due to GIB), s/p Watchman device insertion  HTN/HLD  Diverticulosis    PLAN:    CNS: avoid oversedation    HEENT: Oral care    PULMONARY:  HOB @ 45 degrees    CARDIOVASCULAR: s/p ASA/plavix load; continue ASA, Plavix, statin; s/p PCI left main and BAV with no improvement in gradient 25 - 21  R femoral site with no bleeding, swelling, mild soreness    GI: Dash diet    RENAL:  Follow up lytes.  Correct as needed    INFECTIOUS DISEASE: Afebrile, no need for abx    HEMATOLOGICAL:  DVT ppx with SCD    ENDOCRINE:  Follow up FS.  Insulin protocol if needed.    MUSCULOSKELETAL: Ambulating    DC home

## 2023-10-13 NOTE — PROGRESS NOTE ADULT - SUBJECTIVE AND OBJECTIVE BOX
82 year old woman ex smoker with past medical history of A fib (off anticoagulation due to GIB), s/p Watchman device insertion, HTN, HLD, diverticulosis,  aortic stenosis and hypothyroidism presents for elective TAVR due to progressive dyspnea and fatigue related to AS.     Patient was admitted to CCU for severe symptomatic aortic stenosis and PCI of left main artery, balloon angioplasty.     Overnight events: 1 round of NSVT overnight, otherwise doing well.     CCU 10/12/23- PCI left main and balloon angioplasty planned for today, pt NPO. Loaded with ASA and Plavix.  - PCI successful, remove cordis, monitor A-line and remove in normal pressures, Restart lopressor tonight, repeat ECHO in AM, plan for DC tomorrow      CCU 10/13/23- post procedure ECHO preformed, Plan DC today    Subjective: Pt seen and examined at bedside.    82 year old woman ex smoker with past medical history of A fib (off anticoagulation due to GIB), s/p Watchman device insertion, HTN, HLD, diverticulosis,  aortic stenosis and hypothyroidism presents for elective TAVR due to progressive dyspnea and fatigue related to AS.     Patient was admitted to CCU for severe symptomatic aortic stenosis and PCI of left main artery, balloon angioplasty.     Overnight events: 1 round of NSVT overnight, otherwise doing well.     CCU 10/12/23- PCI left main and balloon angioplasty planned for today, pt NPO. Loaded with ASA and Plavix.  - PCI successful, remove cordis, monitor A-line and remove in normal pressures, Restart lopressor tonight, repeat ECHO in AM, plan for DC tomorrow      CCU 10/13/23- post procedure ECHO preformed, aortic stenosis shows no improvement EF 45.  Plan DC today    Subjective: Pt seen and examined at bedside.    CHIEF COMPLAINT:  Patient is a 82y old  Female who presents with a chief complaint of Aortic Stenosis for elective TAVR (13 Oct 2023 06:23)      INTERVAL HISTORY/OVERNIGHT EVENTS:      82 year old woman ex smoker with past medical history of A fib (off anticoagulation due to GIB), s/p Watchman device insertion, HTN, HLD, diverticulosis,  aortic stenosis and hypothyroidism presents for elective TAVR due to progressive dyspnea and fatigue related to AS.     Patient was admitted to CCU for severe symptomatic aortic stenosis and PCI of left main artery, balloon angioplasty.     Overnight events: 1 round of NSVT overnight, otherwise doing well.     CCU 10/12/23- PCI left main and balloon angioplasty planned for today, pt NPO. Loaded with ASA and Plavix.  - PCI successful, remove cordis, monitor A-line and remove in normal pressures, Restart lopressor tonight, repeat ECHO in AM, plan for DC tomorrow      CCU 10/13/23- post procedure ECHO preformed, aortic stenosis shows no improvement EF 45.  Plan DC today    Subjective: Pt seen and examined at bedside.       ======================  MEDICATIONS:  aspirin enteric coated 81 milliGRAM(s) Oral daily  atorvastatin 40 milliGRAM(s) Oral at bedtime  chlorhexidine 2% Cloths 1 Application(s) Topical <User Schedule>  clopidogrel Tablet 75 milliGRAM(s) Oral daily  levothyroxine 112 MICROGram(s) Oral daily  metoprolol tartrate 50 milliGRAM(s) Oral two times a day    DRIPS:  sodium chloride 0.9%. 1000 milliLiter(s) (100 mL/Hr) IV Continuous <Continuous>    PRN:       ======================  PHYSICAL EXAMINATION:  GEN:  nad.   HEENT:  eomi. ncat  PULM:  b/l lung sounds   CARD: s1, s2  ABD: +bs. ntnd  EXT:  no new rashes.    NEURO:  no new focal deficits.   ======================  OBJECTIVE:        VS:  T(F): 97.6 (10-13 @ 00:00), Max: 97.6 (10-12 @ 19:00)  HR: 74 (10-13 @ 05:00) (65 - 93)  BP: 121/58 (10-13 @ 05:00) (107/68 - 161/111)  RR: 27 (10-13 @ 05:00) (23 - 42)  SpO2: 96% (10-13 @ 05:00) (93% - 100%)  CVP(mm Hg): --  CO: --  CI: --  PA: --  PCWP: --    I/O:      10-12 @ 07:01  -  10-13 @ 06:39  --------------------------------------------------------  IN: 1080 mL / OUT: 1100 mL / NET: -20 mL        Weight trend:  Weight (kg): 93.3 (10-12)    ======================    LABS:                          12.3   7.68  )-----------( 131      ( 13 Oct 2023 05:20 )             40.7     10-13    141  |  104  |  13  ----------------------------<  123<H>  4.2   |  27  |  0.6<L>    Ca    8.6      13 Oct 2023 05:20  Mg     2.1     10-13                  Urinalysis Basic - ( 13 Oct 2023 05:20 )    Color: x / Appearance: x / SG: x / pH: x  Gluc: 123 mg/dL / Ketone: x  / Bili: x / Urobili: x   Blood: x / Protein: x / Nitrite: x   Leuk Esterase: x / RBC: x / WBC x   Sq Epi: x / Non Sq Epi: x / Bacteria: x        Cultures:

## 2023-10-13 NOTE — DISCHARGE NOTE NURSING/CASE MANAGEMENT/SOCIAL WORK - PATIENT PORTAL LINK FT
You can access the FollowMyHealth Patient Portal offered by [facility] by registering at the following website: [email or web address]. By joining [email or web address] FollowMyHealth portal, you will also be able to view your health information using other applications (apps) compatible with our system.

## 2023-10-15 PROBLEM — Z86.39 PERSONAL HISTORY OF OTHER ENDOCRINE, NUTRITIONAL AND METABOLIC DISEASE: Chronic | Status: ACTIVE | Noted: 2023-10-04

## 2023-10-17 ENCOUNTER — INPATIENT (INPATIENT)
Facility: HOSPITAL | Age: 83
LOS: 3 days | Discharge: HOME | DRG: 293 | End: 2023-10-21
Attending: STUDENT IN AN ORGANIZED HEALTH CARE EDUCATION/TRAINING PROGRAM | Admitting: STUDENT IN AN ORGANIZED HEALTH CARE EDUCATION/TRAINING PROGRAM
Payer: MEDICARE

## 2023-10-17 VITALS
HEART RATE: 96 BPM | TEMPERATURE: 98 F | HEIGHT: 63 IN | RESPIRATION RATE: 18 BRPM | DIASTOLIC BLOOD PRESSURE: 74 MMHG | OXYGEN SATURATION: 95 % | WEIGHT: 211.86 LBS | SYSTOLIC BLOOD PRESSURE: 139 MMHG

## 2023-10-17 DIAGNOSIS — I10 ESSENTIAL (PRIMARY) HYPERTENSION: ICD-10-CM

## 2023-10-17 DIAGNOSIS — Z95.818 PRESENCE OF OTHER CARDIAC IMPLANTS AND GRAFTS: Chronic | ICD-10-CM

## 2023-10-17 DIAGNOSIS — Z90.49 ACQUIRED ABSENCE OF OTHER SPECIFIED PARTS OF DIGESTIVE TRACT: Chronic | ICD-10-CM

## 2023-10-17 DIAGNOSIS — E87.70 FLUID OVERLOAD, UNSPECIFIED: ICD-10-CM

## 2023-10-17 LAB
ALBUMIN SERPL ELPH-MCNC: 3.5 G/DL — SIGNIFICANT CHANGE UP (ref 3.5–5.2)
ALBUMIN SERPL ELPH-MCNC: 3.5 G/DL — SIGNIFICANT CHANGE UP (ref 3.5–5.2)
ALP SERPL-CCNC: 100 U/L — SIGNIFICANT CHANGE UP (ref 30–115)
ALP SERPL-CCNC: 100 U/L — SIGNIFICANT CHANGE UP (ref 30–115)
ALT FLD-CCNC: 15 U/L — SIGNIFICANT CHANGE UP (ref 0–41)
ALT FLD-CCNC: 15 U/L — SIGNIFICANT CHANGE UP (ref 0–41)
ANION GAP SERPL CALC-SCNC: 9 MMOL/L — SIGNIFICANT CHANGE UP (ref 7–14)
ANION GAP SERPL CALC-SCNC: 9 MMOL/L — SIGNIFICANT CHANGE UP (ref 7–14)
AST SERPL-CCNC: 42 U/L — HIGH (ref 0–41)
AST SERPL-CCNC: 42 U/L — HIGH (ref 0–41)
BASOPHILS # BLD AUTO: 0.03 K/UL — SIGNIFICANT CHANGE UP (ref 0–0.2)
BASOPHILS # BLD AUTO: 0.03 K/UL — SIGNIFICANT CHANGE UP (ref 0–0.2)
BASOPHILS NFR BLD AUTO: 0.5 % — SIGNIFICANT CHANGE UP (ref 0–1)
BASOPHILS NFR BLD AUTO: 0.5 % — SIGNIFICANT CHANGE UP (ref 0–1)
BILIRUB SERPL-MCNC: 0.6 MG/DL — SIGNIFICANT CHANGE UP (ref 0.2–1.2)
BILIRUB SERPL-MCNC: 0.6 MG/DL — SIGNIFICANT CHANGE UP (ref 0.2–1.2)
BUN SERPL-MCNC: 23 MG/DL — HIGH (ref 10–20)
BUN SERPL-MCNC: 23 MG/DL — HIGH (ref 10–20)
CALCIUM SERPL-MCNC: 8.8 MG/DL — SIGNIFICANT CHANGE UP (ref 8.4–10.4)
CALCIUM SERPL-MCNC: 8.8 MG/DL — SIGNIFICANT CHANGE UP (ref 8.4–10.4)
CHLORIDE SERPL-SCNC: 103 MMOL/L — SIGNIFICANT CHANGE UP (ref 98–110)
CHLORIDE SERPL-SCNC: 103 MMOL/L — SIGNIFICANT CHANGE UP (ref 98–110)
CO2 SERPL-SCNC: 25 MMOL/L — SIGNIFICANT CHANGE UP (ref 17–32)
CO2 SERPL-SCNC: 25 MMOL/L — SIGNIFICANT CHANGE UP (ref 17–32)
CREAT SERPL-MCNC: 0.8 MG/DL — SIGNIFICANT CHANGE UP (ref 0.7–1.5)
CREAT SERPL-MCNC: 0.8 MG/DL — SIGNIFICANT CHANGE UP (ref 0.7–1.5)
EGFR: 73 ML/MIN/1.73M2 — SIGNIFICANT CHANGE UP
EGFR: 73 ML/MIN/1.73M2 — SIGNIFICANT CHANGE UP
EOSINOPHIL # BLD AUTO: 0.13 K/UL — SIGNIFICANT CHANGE UP (ref 0–0.7)
EOSINOPHIL # BLD AUTO: 0.13 K/UL — SIGNIFICANT CHANGE UP (ref 0–0.7)
EOSINOPHIL NFR BLD AUTO: 2.2 % — SIGNIFICANT CHANGE UP (ref 0–8)
EOSINOPHIL NFR BLD AUTO: 2.2 % — SIGNIFICANT CHANGE UP (ref 0–8)
GLUCOSE SERPL-MCNC: 151 MG/DL — HIGH (ref 70–99)
GLUCOSE SERPL-MCNC: 151 MG/DL — HIGH (ref 70–99)
HCT VFR BLD CALC: 37.7 % — SIGNIFICANT CHANGE UP (ref 37–47)
HCT VFR BLD CALC: 37.7 % — SIGNIFICANT CHANGE UP (ref 37–47)
HGB BLD-MCNC: 11.5 G/DL — LOW (ref 12–16)
HGB BLD-MCNC: 11.5 G/DL — LOW (ref 12–16)
IMM GRANULOCYTES NFR BLD AUTO: 0.5 % — HIGH (ref 0.1–0.3)
IMM GRANULOCYTES NFR BLD AUTO: 0.5 % — HIGH (ref 0.1–0.3)
LYMPHOCYTES # BLD AUTO: 1.04 K/UL — LOW (ref 1.2–3.4)
LYMPHOCYTES # BLD AUTO: 1.04 K/UL — LOW (ref 1.2–3.4)
LYMPHOCYTES # BLD AUTO: 17.4 % — LOW (ref 20.5–51.1)
LYMPHOCYTES # BLD AUTO: 17.4 % — LOW (ref 20.5–51.1)
MCHC RBC-ENTMCNC: 29 PG — SIGNIFICANT CHANGE UP (ref 27–31)
MCHC RBC-ENTMCNC: 29 PG — SIGNIFICANT CHANGE UP (ref 27–31)
MCHC RBC-ENTMCNC: 30.5 G/DL — LOW (ref 32–37)
MCHC RBC-ENTMCNC: 30.5 G/DL — LOW (ref 32–37)
MCV RBC AUTO: 95 FL — SIGNIFICANT CHANGE UP (ref 81–99)
MCV RBC AUTO: 95 FL — SIGNIFICANT CHANGE UP (ref 81–99)
MONOCYTES # BLD AUTO: 0.54 K/UL — SIGNIFICANT CHANGE UP (ref 0.1–0.6)
MONOCYTES # BLD AUTO: 0.54 K/UL — SIGNIFICANT CHANGE UP (ref 0.1–0.6)
MONOCYTES NFR BLD AUTO: 9.1 % — SIGNIFICANT CHANGE UP (ref 1.7–9.3)
MONOCYTES NFR BLD AUTO: 9.1 % — SIGNIFICANT CHANGE UP (ref 1.7–9.3)
NEUTROPHILS # BLD AUTO: 4.19 K/UL — SIGNIFICANT CHANGE UP (ref 1.4–6.5)
NEUTROPHILS # BLD AUTO: 4.19 K/UL — SIGNIFICANT CHANGE UP (ref 1.4–6.5)
NEUTROPHILS NFR BLD AUTO: 70.3 % — SIGNIFICANT CHANGE UP (ref 42.2–75.2)
NEUTROPHILS NFR BLD AUTO: 70.3 % — SIGNIFICANT CHANGE UP (ref 42.2–75.2)
NRBC # BLD: 0 /100 WBCS — SIGNIFICANT CHANGE UP (ref 0–0)
NRBC # BLD: 0 /100 WBCS — SIGNIFICANT CHANGE UP (ref 0–0)
NT-PROBNP SERPL-SCNC: 4262 PG/ML — HIGH (ref 0–300)
NT-PROBNP SERPL-SCNC: 4262 PG/ML — HIGH (ref 0–300)
PLATELET # BLD AUTO: 170 K/UL — SIGNIFICANT CHANGE UP (ref 130–400)
PLATELET # BLD AUTO: 170 K/UL — SIGNIFICANT CHANGE UP (ref 130–400)
PMV BLD: 12 FL — HIGH (ref 7.4–10.4)
PMV BLD: 12 FL — HIGH (ref 7.4–10.4)
POTASSIUM SERPL-MCNC: 5.6 MMOL/L — HIGH (ref 3.5–5)
POTASSIUM SERPL-MCNC: 5.6 MMOL/L — HIGH (ref 3.5–5)
POTASSIUM SERPL-SCNC: 5.6 MMOL/L — HIGH (ref 3.5–5)
POTASSIUM SERPL-SCNC: 5.6 MMOL/L — HIGH (ref 3.5–5)
PROT SERPL-MCNC: 6.5 G/DL — SIGNIFICANT CHANGE UP (ref 6–8)
PROT SERPL-MCNC: 6.5 G/DL — SIGNIFICANT CHANGE UP (ref 6–8)
RBC # BLD: 3.97 M/UL — LOW (ref 4.2–5.4)
RBC # BLD: 3.97 M/UL — LOW (ref 4.2–5.4)
RBC # FLD: 16.2 % — HIGH (ref 11.5–14.5)
RBC # FLD: 16.2 % — HIGH (ref 11.5–14.5)
SODIUM SERPL-SCNC: 137 MMOL/L — SIGNIFICANT CHANGE UP (ref 135–146)
SODIUM SERPL-SCNC: 137 MMOL/L — SIGNIFICANT CHANGE UP (ref 135–146)
TROPONIN T SERPL-MCNC: <0.01 NG/ML — SIGNIFICANT CHANGE UP
TROPONIN T SERPL-MCNC: <0.01 NG/ML — SIGNIFICANT CHANGE UP
WBC # BLD: 5.96 K/UL — SIGNIFICANT CHANGE UP (ref 4.8–10.8)
WBC # BLD: 5.96 K/UL — SIGNIFICANT CHANGE UP (ref 4.8–10.8)
WBC # FLD AUTO: 5.96 K/UL — SIGNIFICANT CHANGE UP (ref 4.8–10.8)
WBC # FLD AUTO: 5.96 K/UL — SIGNIFICANT CHANGE UP (ref 4.8–10.8)

## 2023-10-17 PROCEDURE — 83605 ASSAY OF LACTIC ACID: CPT

## 2023-10-17 PROCEDURE — 80061 LIPID PANEL: CPT

## 2023-10-17 PROCEDURE — 80053 COMPREHEN METABOLIC PANEL: CPT

## 2023-10-17 PROCEDURE — 84480 ASSAY TRIIODOTHYRONINE (T3): CPT

## 2023-10-17 PROCEDURE — 99285 EMERGENCY DEPT VISIT HI MDM: CPT

## 2023-10-17 PROCEDURE — 71045 X-RAY EXAM CHEST 1 VIEW: CPT

## 2023-10-17 PROCEDURE — 93005 ELECTROCARDIOGRAM TRACING: CPT

## 2023-10-17 PROCEDURE — 83735 ASSAY OF MAGNESIUM: CPT

## 2023-10-17 PROCEDURE — 84439 ASSAY OF FREE THYROXINE: CPT

## 2023-10-17 PROCEDURE — 99222 1ST HOSP IP/OBS MODERATE 55: CPT

## 2023-10-17 PROCEDURE — 84443 ASSAY THYROID STIM HORMONE: CPT

## 2023-10-17 PROCEDURE — 36415 COLL VENOUS BLD VENIPUNCTURE: CPT

## 2023-10-17 PROCEDURE — 71045 X-RAY EXAM CHEST 1 VIEW: CPT | Mod: 26

## 2023-10-17 PROCEDURE — 84100 ASSAY OF PHOSPHORUS: CPT

## 2023-10-17 PROCEDURE — 85025 COMPLETE CBC W/AUTO DIFF WBC: CPT

## 2023-10-17 RX ORDER — ASPIRIN/CALCIUM CARB/MAGNESIUM 324 MG
81 TABLET ORAL DAILY
Refills: 0 | Status: DISCONTINUED | OUTPATIENT
Start: 2023-10-17 | End: 2023-10-21

## 2023-10-17 RX ORDER — FUROSEMIDE 40 MG
40 TABLET ORAL ONCE
Refills: 0 | Status: COMPLETED | OUTPATIENT
Start: 2023-10-17 | End: 2023-10-17

## 2023-10-17 RX ORDER — CLOPIDOGREL BISULFATE 75 MG/1
75 TABLET, FILM COATED ORAL DAILY
Refills: 0 | Status: DISCONTINUED | OUTPATIENT
Start: 2023-10-17 | End: 2023-10-21

## 2023-10-17 RX ORDER — LEVOTHYROXINE SODIUM 125 MCG
112 TABLET ORAL DAILY
Refills: 0 | Status: DISCONTINUED | OUTPATIENT
Start: 2023-10-17 | End: 2023-10-21

## 2023-10-17 RX ORDER — FUROSEMIDE 40 MG
40 TABLET ORAL
Refills: 0 | Status: DISCONTINUED | OUTPATIENT
Start: 2023-10-18 | End: 2023-10-19

## 2023-10-17 RX ORDER — METOPROLOL TARTRATE 50 MG
50 TABLET ORAL
Refills: 0 | Status: DISCONTINUED | OUTPATIENT
Start: 2023-10-17 | End: 2023-10-18

## 2023-10-17 RX ORDER — ATORVASTATIN CALCIUM 80 MG/1
40 TABLET, FILM COATED ORAL AT BEDTIME
Refills: 0 | Status: DISCONTINUED | OUTPATIENT
Start: 2023-10-17 | End: 2023-10-21

## 2023-10-17 RX ORDER — HEPARIN SODIUM 5000 [USP'U]/ML
5000 INJECTION INTRAVENOUS; SUBCUTANEOUS EVERY 8 HOURS
Refills: 0 | Status: DISCONTINUED | OUTPATIENT
Start: 2023-10-17 | End: 2023-10-21

## 2023-10-17 RX ADMIN — Medication 40 MILLIGRAM(S): at 22:11

## 2023-10-17 NOTE — H&P ADULT - ASSESSMENT
82 year old female ex-smoker with past medical history of A fib (off anticoagulation due to GIB), s/p Watchman, HTN, HLD, diverticulosis, non-erosive gastritis, aortic stenosis s/p recent BAV, CAD s/p recent PCI to dLM/pLAD, hypothyroidism presented for for worsening dyspnea and fatigue 4 days after discharge    #Acute Decompensated HFmrEF  #Severe Aortic Stenosis s/p BAV  #LMain dx s/p balloon angioplasty with PCI  -IVC 2.6cm <50% collapse in ED, off diuretics at home  -CXR small pleural effusions, pt sat fine on RA  -EKG without change, trop <0.01 x1  -c/w lasix 40mg IVP BID  -strict I/O, fluid restrict diet  - BMP + Mg BID. Keep K > 4 and Mg > 2.    #Atrial Fibrillation s/p watchmann, off AC, rate controlled  -c/w metoprolol    #CAD s/p PCI  #Essential Hypertension, controlled  -c/w DAPT, STATIN    #Hypothyroidism  -repeat studies, c/w home synthroid dosing    #DVT ppx  -hsq    #GI ppx  -n/a    #Diet  -1200mL fl restrict    #Activity  -IAT    #Dispo  -4T

## 2023-10-17 NOTE — H&P ADULT - NSHPLABSRESULTS_GEN_ALL_CORE
Vital Signs Last 24 Hrs  T(C): 36.4 (17 Oct 2023 18:40), Max: 36.4 (17 Oct 2023 18:40)  T(F): 97.6 (17 Oct 2023 18:40), Max: 97.6 (17 Oct 2023 18:40)  HR: 96 (17 Oct 2023 18:40) (96 - 96)  BP: 139/74 (17 Oct 2023 18:40) (139/74 - 139/74)  BP(mean): --  RR: 18 (17 Oct 2023 18:40) (18 - 18)  SpO2: 95% (17 Oct 2023 18:40) (95% - 95%)    Parameters below as of 17 Oct 2023 18:40  Patient On (Oxygen Delivery Method): room air      CBC Full  -  ( 17 Oct 2023 21:15 )  WBC Count : 5.96 K/uL  RBC Count : 3.97 M/uL  Hemoglobin : 11.5 g/dL  Hematocrit : 37.7 %  Platelet Count - Automated : 170 K/uL  Mean Cell Volume : 95.0 fL  Mean Cell Hemoglobin : 29.0 pg  Mean Cell Hemoglobin Concentration : 30.5 g/dL  Auto Neutrophil # : 4.19 K/uL  Auto Lymphocyte # : 1.04 K/uL  Auto Monocyte # : 0.54 K/uL  Auto Eosinophil # : 0.13 K/uL  Auto Basophil # : 0.03 K/uL  Auto Neutrophil % : 70.3 %  Auto Lymphocyte % : 17.4 %  Auto Monocyte % : 9.1 %  Auto Eosinophil % : 2.2 %  Auto Basophil % : 0.5 %    10-17    137  |  103  |  23<H>  ----------------------------<  151<H>  5.6<H>   |  25  |  0.8    Ca    8.8      17 Oct 2023 20:15    TPro  6.5  /  Alb  3.5  /  TBili  0.6  /  DBili  x   /  AST  42<H>  /  ALT  15  /  AlkPhos  100  10-17    LIVER FUNCTIONS - ( 17 Oct 2023 20:15 )  Alb: 3.5 g/dL / Pro: 6.5 g/dL / ALK PHOS: 100 U/L / ALT: 15 U/L / AST: 42 U/L / GGT: x           Urinalysis Basic - ( 17 Oct 2023 20:15 )    Color: x / Appearance: x / SG: x / pH: x  Gluc: 151 mg/dL / Ketone: x  / Bili: x / Urobili: x   Blood: x / Protein: x / Nitrite: x   Leuk Esterase: x / RBC: x / WBC x   Sq Epi: x / Non Sq Epi: x / Bacteria: x

## 2023-10-17 NOTE — H&P ADULT - HISTORY OF PRESENT ILLNESS
82 year old female ex-smoker with past medical history of A fib (off anticoagulation due to GIB), s/p Watchman, HTN, HLD, diverticulosis, non-erosive gastritis, aortic stenosis s/p recent BAV, CAD s/p recent PCI to dLM/pLAD, hypothyroidism presented for for worsening dyspnea and fatigue 4 days after discharge. Patient reports that since discharge she noticed worsening shortness of breath and orthopnea. She also reports b/l LE and UE swelling  Patient was recently admitted for TAVR. However cath showed significant dLM/pLAD disease and patient underwent PCI with BAV and was discharged with staged TAVR planned in November. Reports being compliant with medications since discharge.    T(C): 36.4 (10-17-23 @ 18:40), Max: 36.4 (10-17-23 @ 18:40)  T(F): 97.6 (10-17-23 @ 18:40), Max: 97.6 (10-17-23 @ 18:40)  HR: 96 (10-17-23 @ 18:40) (96 - 96)  BP: 139/74 (10-17-23 @ 18:40) (139/74 - 139/74)  RR: 18 (10-17-23 @ 18:40) (18 - 18)  SpO2: 95% (10-17-23 @ 18:40) (95% - 95%)  Wt(kg): --    Labs significant for BNP 4262 (elevated from 10/4/23), trop <0.01. EKG similar to prior ; afib with left axis deviation. CXR without opacity or pulmonary edema. Pt evaluated by cardio in ED, concerned for acute onset decompensated HFmrEF in setting of valvulopathy. Pt given lasix 40mg IVP in ED with good diuresis.

## 2023-10-17 NOTE — H&P ADULT - ATTENDING COMMENTS
81 y/o female PMH CAD, ICM, HFmrEF, severe AS who presents with ADHF. Admission for IV diuresis but otherwise stable.

## 2023-10-17 NOTE — CONSULT NOTE ADULT - SUBJECTIVE AND OBJECTIVE BOX
Date of Admission: 10-17-23    CHIEF COMPLAINT: Shortness of breath     CARDIAC HISTORY OF PRESENT ILLNESS:    82 year old female ex-smoker with past medical history of A fib (off anticoagulation due to GIB), s/p Watchman, HTN, HLD, diverticulosis, non-erosive gastritis, aortic stenosis s/p recent BAV, CAD s/p recent PCI to dLM/pLAD, hypothyroidism presented for for worsening dyspnea and fatigue 4 days after discharge. Patient reports that since discharge she noticed worsening shortness of breath and orthopnea. She also reports b/l LE and UE swelling  Patient was recently admitted for TAVR. However cath showed significant dLM/pLAD disease and patient underwent PCI with BAV and was discharged with staged TAVR planned in November. Reports being compliant with medications since discharge.      PAST MEDICAL & SURGICAL HISTORY  Hypercholesteremia    Hypothyroid    Atrial fibrillation    HTN (hypertension)    Former smoker    Aortic stenosis    H/O: obesity    S/P appendectomy    Presence of Watchman left atrial appendage closure device    Presence of Amulet left atrial appendage closure device        FAMILY HISTORY:    None [ ]  Mother:   Father:   Siblings:     SOCIAL HISTORY:   [ X] Ex-smoker  [ ] Smoker  [ ] Alcohol    Allergies    penicillins (Rash)    Intolerances    	    REVIEW OF SYMPTOMS:  CONSTITUTIONAL: Weakness +ve  EYES: No visual changes, eye pain, or discharge  ENT: No vertigo; No ear pain or change in hearing; No sore throat or difficulty swallowing  NECK: No pain or stiffness  RESPIRATORY: Shortness of breath  CARDIOVASCULAR: No chest pain or palpitations  GASTROINTESTINAL: No abdominal or epigastric pain; No nausea, vomiting, or hematemesis; No diarrhea or constipation; No melena or hematochezia  GENITOURINARY: No dysuria, frequency or hematuria  MUSCULOSKELETAL: No joint pain, no muscle pain, no weakness  NEUROLOGICAL: No numbness or weakness  SKIN: No itching or rashes      VITALS:  T(C): 36.4 (10-17-23 @ 18:40), Max: 36.4 (10-17-23 @ 18:40)  HR: 96 (10-17-23 @ 18:40) (96 - 96)  BP: 139/74 (10-17-23 @ 18:40) (139/74 - 139/74)  RR: 18 (10-17-23 @ 18:40) (18 - 18)  SpO2: 95% (10-17-23 @ 18:40) (95% - 95%)  Wt(kg): --  I&O's Summary    Daily Height in cm: 160.02 (17 Oct 2023 18:40)    Daily     PHYSICAL EXAM:  GEN: Not in acute distress  HEENT: EOMI  LUNGS: Dec BS   CARDIOVASCULAR: RRR, Systolic murmur   ABD: Soft, non-tender, non-distended  EXT: 2+ LE edema   SKIN: Intact  NEURO: AAOx3    LABS:	 	                          11.5   5.96  )-----------( 170      ( 17 Oct 2023 21:15 )             37.7     10-17    137  |  103  |  23<H>  ----------------------------<  151<H>  5.6<H>   |  25  |  0.8    Ca    8.8      17 Oct 2023 20:15    TPro  6.5  /  Alb  3.5  /  TBili  0.6  /  DBili  x   /  AST  42<H>  /  ALT  15  /  AlkPhos  100  10-17    CARDIAC MARKERS ( 17 Oct 2023 20:15 )  x     / <0.01 ng/mL / x     / x     / x              CARDIAC MARKERS:  Troponin trend: <0.01 (17 Oct 2023 20:15)    TELEMETRY EVENTS:      ECG:    RADIOLOGY:    OTHER:    Echocardiogram:    < from: TTE Echo Complete w/ Contrast w/ Doppler (10.12.23 @ 13:18) >   1. Mildly decreased global left ventricular systolic function.   2. Moderate to severe right atrial enlargement.   3. LV Ejection Fraction by Gaytan's Method with a biplane EF of 45 %.   4. Moderate left ventricular hypertrophy.   5. Moderately enlarged right ventricle.   6. Moderately reduced RV systolic function.   7. Moderately enlarged left atrium.   8. Degenerative mitral valve.   9. Moderate mitral valve regurgitation.  10. Thickening of the anterior and posterior mitral valve leaflets.  11. Moderate-severe tricuspid regurgitation.  12. Mild aortic regurgitation.  13. Moderate to severe aortic valve stenosis.  14. Mild pulmonic valve regurgitation.  15. Estimated pulmonary artery systolic pressure is 55.4 mmHg assuming a   right atrial pressure of 10 mmHg, which is consistent with moderate   pulmonary hypertension.  16. Endocardial visualization was enhanced with intravenous echo contrast.        < end of copied text >  Catheterization:    Stress Test: 	  	    Home Medications:  Synthroid 112 mcg (0.112 mg) oral tablet: 1 tab(s) orally once a day (11 Oct 2023 11:35)    MEDICATIONS  (STANDING):    MEDICATIONS  (PRN):

## 2023-10-17 NOTE — ED PROVIDER NOTE - OBJECTIVE STATEMENT
82 year old F From home, ex-smoker, A-fib not on anticoagulation due to GI bleed, hypertension, hyperlipidemia, hypothyroid, aortic stenosis presenting to ER for evaluation of chest pain/shortness of breath.  Patient was recently admitted to the hospital 1 week ago and scheduled for TAVR procedure had cath 10/12 with Dr. Spaulding which found left main CAD disease status post PCI TAVR was postponed.  Patient states since she was discharged 4 days ago has been having progressively worsening dyspnea on exertion, lower extremity swelling, orthopnea and mild chest tightness with exertion.  Symptoms improved with rest.  No fever, nausea, vomiting, syncope/lightheadedness/dizziness, abdominal pain, history of DVT/PE.

## 2023-10-17 NOTE — CONSULT NOTE ADULT - ASSESSMENT
83 YO F ex-smoker (quit 40 years ago) with PMHx of A.fib (off anticoagulation due to GIB, s/p Watchman), HTN, HLD, diverticulosis, non-erosive gastritis, aortic stenosis s/p recent BAV, CAD s/p recent PCI to dLM/pLAD, hypothyroidism presented for for worsening dyspnea and fatigue 4 days after discharge.    TTE 10/12/23: EF 45%. Mod LVH. Mod-sev RA enlargement. Mod reduced RV systolic function. Mod enlarged LA. Mod MVR. Mod-sev TR. Mod-sev AS. Mod pHTN (PASP 55 mmHg).    IMPRESSION  #Decompensated HFmrEF in the setting of VHD (Severe AS s/p recent BAV, Mod MVR, Mod-sev TR)  - EKG similar to prior. Troponin -ve x 1.  - X-ray will small b/l pleural effusions   - Bedside echo significant for IVC 2.6 cm with < 50% collapsibility   #CAD with PCI to dLM/pLAD (10/12/23)  #Afib s/p Watchman   #HTN    PLAN  - Get lactate  - Start lasix 40mg IV BID  - Continue ASA 81 mg PO QD + Plavix 75 mg PO QD  - Continue Metoprolol tartrate 50 mg PO BID   - BMP + Mg BID. Keep K > 4 and Mg > 2.  - Monitor UO. Fluid restriction. Keep I < O 83 YO F ex-smoker (quit 40 years ago) with PMHx of A.fib (off anticoagulation due to GIB, s/p Watchman), HTN, HLD, diverticulosis, non-erosive gastritis, aortic stenosis s/p recent BAV, CAD s/p recent PCI to dLM/pLAD, hypothyroidism presented for worsening dyspnea and fatigue 4 days after discharge.    TTE 10/12/23: EF 45%. Mod LVH. Mod-sev RA enlargement. Mod reduced RV systolic function. Mod enlarged LA. Mod MVR. Mod-sev TR. Mod-sev AS. Mod pHTN (PASP 55 mmHg).    IMPRESSION  #Decompensated HFmrEF in the setting of VHD (Severe AS s/p recent BAV, Mod MVR, Mod-sev TR)  - EKG similar to prior. Troponin -ve x 1.  - X-ray will small b/l pleural effusions   - Bedside echo significant for IVC 2.6 cm with < 50% collapsibility   #CAD with PCI to dLM/pLAD (10/12/23)  #Afib s/p Watchman   #HTN    PLAN  - Get lactate  - Start lasix 40mg IV BID  - Continue ASA 81 mg PO QD + Plavix 75 mg PO QD  - Continue Metoprolol tartrate 50 mg PO BID   - BMP + Mg BID. Keep K > 4 and Mg > 2.  - Monitor UO. Fluid restriction. Keep I < O

## 2023-10-17 NOTE — ED PROVIDER NOTE - CLINICAL SUMMARY MEDICAL DECISION MAKING FREE TEXT BOX
82yF CAD s/p recent stent 5d ago p/w diffuse edema and worsening SOB.  Pt hemodynamically stable and w/o resp distress but with anasarca.  EKG w/o new ischemic changes.  CXR w/o ptx or pna.  Labs w/ BNP double what it was 2wk ago and normal electrolytes and troponin.  Pt started on IV lasix pending cardiology recs.  D/w cardiology - recommend admission to cardiac SDU.

## 2023-10-17 NOTE — ED ADULT NURSE NOTE - OBJECTIVE STATEMENT
pt c/o generalized weakness, SOB and b/l lower extremity heaviness/swelling   states symptoms are progressively getting worse   denies CP

## 2023-10-17 NOTE — ED PROVIDER NOTE - ATTENDING APP SHARED VISIT CONTRIBUTION OF CARE
82yF CAD s/p stent 10/12 CHF EF 45% on recent echo AS (in consideration for TAVR) p/w worsening edema (b/l LE, b/l upper extremities, ?face) and SOB w/ cough.  No CP.  No fever.

## 2023-10-17 NOTE — H&P ADULT - NSHPPHYSICALEXAM_GEN_ALL_CORE
GENERAL: AAOx4. Overweight, laying in bed appearing in no acute distress  HEENT: No FNDs, atraumatic, normocephalic, moist mucus membranes  LUNGS: Clear to auscultation bilaterally  HEART: S1/S2. No heaves or thrills  ABD: Distended but soft  EXT/NEURO: 5/5 strength in all extremity joints. Sensation and ROM grossly intact.  SKIN: 2+ non pitting edema up to knees

## 2023-10-18 LAB
ALBUMIN SERPL ELPH-MCNC: 3.8 G/DL — SIGNIFICANT CHANGE UP (ref 3.5–5.2)
ALBUMIN SERPL ELPH-MCNC: 3.8 G/DL — SIGNIFICANT CHANGE UP (ref 3.5–5.2)
ALP SERPL-CCNC: 101 U/L — SIGNIFICANT CHANGE UP (ref 30–115)
ALP SERPL-CCNC: 101 U/L — SIGNIFICANT CHANGE UP (ref 30–115)
ALT FLD-CCNC: 13 U/L — SIGNIFICANT CHANGE UP (ref 0–41)
ALT FLD-CCNC: 13 U/L — SIGNIFICANT CHANGE UP (ref 0–41)
ANION GAP SERPL CALC-SCNC: 12 MMOL/L — SIGNIFICANT CHANGE UP (ref 7–14)
ANION GAP SERPL CALC-SCNC: 12 MMOL/L — SIGNIFICANT CHANGE UP (ref 7–14)
AST SERPL-CCNC: 21 U/L — SIGNIFICANT CHANGE UP (ref 0–41)
AST SERPL-CCNC: 21 U/L — SIGNIFICANT CHANGE UP (ref 0–41)
BASOPHILS # BLD AUTO: 0.03 K/UL — SIGNIFICANT CHANGE UP (ref 0–0.2)
BASOPHILS # BLD AUTO: 0.03 K/UL — SIGNIFICANT CHANGE UP (ref 0–0.2)
BASOPHILS NFR BLD AUTO: 0.5 % — SIGNIFICANT CHANGE UP (ref 0–1)
BASOPHILS NFR BLD AUTO: 0.5 % — SIGNIFICANT CHANGE UP (ref 0–1)
BILIRUB SERPL-MCNC: 0.8 MG/DL — SIGNIFICANT CHANGE UP (ref 0.2–1.2)
BILIRUB SERPL-MCNC: 0.8 MG/DL — SIGNIFICANT CHANGE UP (ref 0.2–1.2)
BUN SERPL-MCNC: 19 MG/DL — SIGNIFICANT CHANGE UP (ref 10–20)
BUN SERPL-MCNC: 19 MG/DL — SIGNIFICANT CHANGE UP (ref 10–20)
CALCIUM SERPL-MCNC: 9 MG/DL — SIGNIFICANT CHANGE UP (ref 8.4–10.5)
CALCIUM SERPL-MCNC: 9 MG/DL — SIGNIFICANT CHANGE UP (ref 8.4–10.5)
CHLORIDE SERPL-SCNC: 100 MMOL/L — SIGNIFICANT CHANGE UP (ref 98–110)
CHLORIDE SERPL-SCNC: 100 MMOL/L — SIGNIFICANT CHANGE UP (ref 98–110)
CO2 SERPL-SCNC: 29 MMOL/L — SIGNIFICANT CHANGE UP (ref 17–32)
CO2 SERPL-SCNC: 29 MMOL/L — SIGNIFICANT CHANGE UP (ref 17–32)
CREAT SERPL-MCNC: 0.7 MG/DL — SIGNIFICANT CHANGE UP (ref 0.7–1.5)
CREAT SERPL-MCNC: 0.7 MG/DL — SIGNIFICANT CHANGE UP (ref 0.7–1.5)
EGFR: 86 ML/MIN/1.73M2 — SIGNIFICANT CHANGE UP
EGFR: 86 ML/MIN/1.73M2 — SIGNIFICANT CHANGE UP
EOSINOPHIL # BLD AUTO: 0.16 K/UL — SIGNIFICANT CHANGE UP (ref 0–0.7)
EOSINOPHIL # BLD AUTO: 0.16 K/UL — SIGNIFICANT CHANGE UP (ref 0–0.7)
EOSINOPHIL NFR BLD AUTO: 2.5 % — SIGNIFICANT CHANGE UP (ref 0–8)
EOSINOPHIL NFR BLD AUTO: 2.5 % — SIGNIFICANT CHANGE UP (ref 0–8)
GLUCOSE SERPL-MCNC: 81 MG/DL — SIGNIFICANT CHANGE UP (ref 70–99)
GLUCOSE SERPL-MCNC: 81 MG/DL — SIGNIFICANT CHANGE UP (ref 70–99)
HCT VFR BLD CALC: 38.5 % — SIGNIFICANT CHANGE UP (ref 37–47)
HCT VFR BLD CALC: 38.5 % — SIGNIFICANT CHANGE UP (ref 37–47)
HGB BLD-MCNC: 11.8 G/DL — LOW (ref 12–16)
HGB BLD-MCNC: 11.8 G/DL — LOW (ref 12–16)
IMM GRANULOCYTES NFR BLD AUTO: 0.5 % — HIGH (ref 0.1–0.3)
IMM GRANULOCYTES NFR BLD AUTO: 0.5 % — HIGH (ref 0.1–0.3)
LACTATE SERPL-SCNC: 1 MMOL/L — SIGNIFICANT CHANGE UP (ref 0.7–2)
LACTATE SERPL-SCNC: 1 MMOL/L — SIGNIFICANT CHANGE UP (ref 0.7–2)
LYMPHOCYTES # BLD AUTO: 1.26 K/UL — SIGNIFICANT CHANGE UP (ref 1.2–3.4)
LYMPHOCYTES # BLD AUTO: 1.26 K/UL — SIGNIFICANT CHANGE UP (ref 1.2–3.4)
LYMPHOCYTES # BLD AUTO: 19.6 % — LOW (ref 20.5–51.1)
LYMPHOCYTES # BLD AUTO: 19.6 % — LOW (ref 20.5–51.1)
MAGNESIUM SERPL-MCNC: 2 MG/DL — SIGNIFICANT CHANGE UP (ref 1.8–2.4)
MAGNESIUM SERPL-MCNC: 2 MG/DL — SIGNIFICANT CHANGE UP (ref 1.8–2.4)
MCHC RBC-ENTMCNC: 29.1 PG — SIGNIFICANT CHANGE UP (ref 27–31)
MCHC RBC-ENTMCNC: 29.1 PG — SIGNIFICANT CHANGE UP (ref 27–31)
MCHC RBC-ENTMCNC: 30.6 G/DL — LOW (ref 32–37)
MCHC RBC-ENTMCNC: 30.6 G/DL — LOW (ref 32–37)
MCV RBC AUTO: 95.1 FL — SIGNIFICANT CHANGE UP (ref 81–99)
MCV RBC AUTO: 95.1 FL — SIGNIFICANT CHANGE UP (ref 81–99)
MONOCYTES # BLD AUTO: 0.72 K/UL — HIGH (ref 0.1–0.6)
MONOCYTES # BLD AUTO: 0.72 K/UL — HIGH (ref 0.1–0.6)
MONOCYTES NFR BLD AUTO: 11.2 % — HIGH (ref 1.7–9.3)
MONOCYTES NFR BLD AUTO: 11.2 % — HIGH (ref 1.7–9.3)
NEUTROPHILS # BLD AUTO: 4.24 K/UL — SIGNIFICANT CHANGE UP (ref 1.4–6.5)
NEUTROPHILS # BLD AUTO: 4.24 K/UL — SIGNIFICANT CHANGE UP (ref 1.4–6.5)
NEUTROPHILS NFR BLD AUTO: 65.7 % — SIGNIFICANT CHANGE UP (ref 42.2–75.2)
NEUTROPHILS NFR BLD AUTO: 65.7 % — SIGNIFICANT CHANGE UP (ref 42.2–75.2)
NRBC # BLD: 0 /100 WBCS — SIGNIFICANT CHANGE UP (ref 0–0)
NRBC # BLD: 0 /100 WBCS — SIGNIFICANT CHANGE UP (ref 0–0)
PHOSPHATE SERPL-MCNC: 4.3 MG/DL — SIGNIFICANT CHANGE UP (ref 2.1–4.9)
PHOSPHATE SERPL-MCNC: 4.3 MG/DL — SIGNIFICANT CHANGE UP (ref 2.1–4.9)
PLATELET # BLD AUTO: 154 K/UL — SIGNIFICANT CHANGE UP (ref 130–400)
PLATELET # BLD AUTO: 154 K/UL — SIGNIFICANT CHANGE UP (ref 130–400)
PMV BLD: 11.9 FL — HIGH (ref 7.4–10.4)
PMV BLD: 11.9 FL — HIGH (ref 7.4–10.4)
POTASSIUM SERPL-MCNC: 3.6 MMOL/L — SIGNIFICANT CHANGE UP (ref 3.5–5)
POTASSIUM SERPL-MCNC: 3.6 MMOL/L — SIGNIFICANT CHANGE UP (ref 3.5–5)
POTASSIUM SERPL-SCNC: 3.6 MMOL/L — SIGNIFICANT CHANGE UP (ref 3.5–5)
POTASSIUM SERPL-SCNC: 3.6 MMOL/L — SIGNIFICANT CHANGE UP (ref 3.5–5)
PROT SERPL-MCNC: 6.4 G/DL — SIGNIFICANT CHANGE UP (ref 6–8)
PROT SERPL-MCNC: 6.4 G/DL — SIGNIFICANT CHANGE UP (ref 6–8)
RBC # BLD: 4.05 M/UL — LOW (ref 4.2–5.4)
RBC # BLD: 4.05 M/UL — LOW (ref 4.2–5.4)
RBC # FLD: 16.4 % — HIGH (ref 11.5–14.5)
RBC # FLD: 16.4 % — HIGH (ref 11.5–14.5)
SODIUM SERPL-SCNC: 141 MMOL/L — SIGNIFICANT CHANGE UP (ref 135–146)
SODIUM SERPL-SCNC: 141 MMOL/L — SIGNIFICANT CHANGE UP (ref 135–146)
T3 SERPL-MCNC: 87 NG/DL — SIGNIFICANT CHANGE UP (ref 80–200)
T3 SERPL-MCNC: 87 NG/DL — SIGNIFICANT CHANGE UP (ref 80–200)
T4 FREE SERPL-MCNC: 1.3 NG/DL — SIGNIFICANT CHANGE UP (ref 0.9–1.8)
T4 FREE SERPL-MCNC: 1.3 NG/DL — SIGNIFICANT CHANGE UP (ref 0.9–1.8)
TSH SERPL-MCNC: 0.87 UIU/ML — SIGNIFICANT CHANGE UP (ref 0.27–4.2)
TSH SERPL-MCNC: 0.87 UIU/ML — SIGNIFICANT CHANGE UP (ref 0.27–4.2)
WBC # BLD: 6.44 K/UL — SIGNIFICANT CHANGE UP (ref 4.8–10.8)
WBC # BLD: 6.44 K/UL — SIGNIFICANT CHANGE UP (ref 4.8–10.8)
WBC # FLD AUTO: 6.44 K/UL — SIGNIFICANT CHANGE UP (ref 4.8–10.8)
WBC # FLD AUTO: 6.44 K/UL — SIGNIFICANT CHANGE UP (ref 4.8–10.8)

## 2023-10-18 PROCEDURE — 93010 ELECTROCARDIOGRAM REPORT: CPT

## 2023-10-18 PROCEDURE — 99232 SBSQ HOSP IP/OBS MODERATE 35: CPT

## 2023-10-18 PROCEDURE — 71045 X-RAY EXAM CHEST 1 VIEW: CPT | Mod: 26

## 2023-10-18 RX ORDER — METOPROLOL TARTRATE 50 MG
50 TABLET ORAL DAILY
Refills: 0 | Status: DISCONTINUED | OUTPATIENT
Start: 2023-10-18 | End: 2023-10-19

## 2023-10-18 RX ORDER — CHLORHEXIDINE GLUCONATE 213 G/1000ML
1 SOLUTION TOPICAL DAILY
Refills: 0 | Status: DISCONTINUED | OUTPATIENT
Start: 2023-10-18 | End: 2023-10-21

## 2023-10-18 RX ORDER — SPIRONOLACTONE 25 MG/1
25 TABLET, FILM COATED ORAL DAILY
Refills: 0 | Status: DISCONTINUED | OUTPATIENT
Start: 2023-10-18 | End: 2023-10-21

## 2023-10-18 RX ORDER — POTASSIUM CHLORIDE 20 MEQ
40 PACKET (EA) ORAL ONCE
Refills: 0 | Status: COMPLETED | OUTPATIENT
Start: 2023-10-18 | End: 2023-10-18

## 2023-10-18 RX ADMIN — HEPARIN SODIUM 5000 UNIT(S): 5000 INJECTION INTRAVENOUS; SUBCUTANEOUS at 13:12

## 2023-10-18 RX ADMIN — Medication 40 MILLIEQUIVALENT(S): at 10:06

## 2023-10-18 RX ADMIN — Medication 50 MILLIGRAM(S): at 05:43

## 2023-10-18 RX ADMIN — Medication 40 MILLIGRAM(S): at 13:12

## 2023-10-18 RX ADMIN — CHLORHEXIDINE GLUCONATE 1 APPLICATION(S): 213 SOLUTION TOPICAL at 13:20

## 2023-10-18 RX ADMIN — CLOPIDOGREL BISULFATE 75 MILLIGRAM(S): 75 TABLET, FILM COATED ORAL at 13:11

## 2023-10-18 RX ADMIN — Medication 112 MICROGRAM(S): at 05:43

## 2023-10-18 RX ADMIN — HEPARIN SODIUM 5000 UNIT(S): 5000 INJECTION INTRAVENOUS; SUBCUTANEOUS at 05:42

## 2023-10-18 RX ADMIN — Medication 81 MILLIGRAM(S): at 13:11

## 2023-10-18 RX ADMIN — Medication 40 MILLIGRAM(S): at 05:42

## 2023-10-18 RX ADMIN — SPIRONOLACTONE 25 MILLIGRAM(S): 25 TABLET, FILM COATED ORAL at 13:11

## 2023-10-18 RX ADMIN — HEPARIN SODIUM 5000 UNIT(S): 5000 INJECTION INTRAVENOUS; SUBCUTANEOUS at 21:02

## 2023-10-18 NOTE — PATIENT PROFILE ADULT - DO YOU FEEL THREATENED BY OTHERS?
Tazorac Pregnancy And Lactation Text: This medication is not safe during pregnancy. It is unknown if this medication is excreted in breast milk. Tazorac Counseling:  Patient advised that medication is irritating and drying.  Patient may need to apply sparingly and wash off after an hour before eventually leaving it on overnight.  The patient verbalized understanding of the proper use and possible adverse effects of tazorac.  All of the patient's questions and concerns were addressed. Benzoyl Peroxide Pregnancy And Lactation Text: This medication is Pregnancy Category C. It is unknown if benzoyl peroxide is excreted in breast milk. Topical Sulfur Applications Pregnancy And Lactation Text: This medication is Pregnancy Category C and has an unknown safety profile during pregnancy. It is unknown if this topical medication is excreted in breast milk. Bactrim Counseling:  I discussed with the patient the risks of sulfa antibiotics including but not limited to GI upset, allergic reaction, drug rash, diarrhea, dizziness, photosensitivity, and yeast infections.  Rarely, more serious reactions can occur including but not limited to aplastic anemia, agranulocytosis, methemoglobinemia, blood dyscrasias, liver or kidney failure, lung infiltrates or desquamative/blistering drug rashes. Topical Clindamycin Counseling: Patient counseled that this medication may cause skin irritation or allergic reactions.  In the event of skin irritation, the patient was advised to reduce the amount of the drug applied or use it less frequently.   The patient verbalized understanding of the proper use and possible adverse effects of clindamycin.  All of the patient's questions and concerns were addressed. Spironolactone Pregnancy And Lactation Text: This medication can cause feminization of the male fetus and should be avoided during pregnancy. The active metabolite is also found in breast milk. Detail Level: Zone Benzoyl Peroxide Counseling: Patient counseled that medicine may cause skin irritation and bleach clothing.  In the event of skin irritation, the patient was advised to reduce the amount of the drug applied or use it less frequently.   The patient verbalized understanding of the proper use and possible adverse effects of benzoyl peroxide.  All of the patient's questions and concerns were addressed. Bactrim Pregnancy And Lactation Text: This medication is Pregnancy Category D and is known to cause fetal risk.  It is also excreted in breast milk. Azithromycin Counseling:  I discussed with the patient the risks of azithromycin including but not limited to GI upset, allergic reaction, drug rash, diarrhea, and yeast infections. Tetracycline Counseling: Patient counseled regarding possible photosensitivity and increased risk for sunburn.  Patient instructed to avoid sunlight, if possible.  When exposed to sunlight, patients should wear protective clothing, sunglasses, and sunscreen.  The patient was instructed to call the office immediately if the following severe adverse effects occur:  hearing changes, easy bruising/bleeding, severe headache, or vision changes.  The patient verbalized understanding of the proper use and possible adverse effects of tetracycline.  All of the patient's questions and concerns were addressed. Patient understands to avoid pregnancy while on therapy due to potential birth defects. Include Pregnancy/Lactation Warning?: No Azithromycin Pregnancy And Lactation Text: This medication is considered safe during pregnancy and is also secreted in breast milk. Erythromycin Pregnancy And Lactation Text: This medication is Pregnancy Category B and is considered safe during pregnancy. It is also excreted in breast milk. no High Dose Vitamin A Pregnancy And Lactation Text: High dose vitamin A therapy is contraindicated during pregnancy and breast feeding. Doxycycline Counseling:  Patient counseled regarding possible photosensitivity and increased risk for sunburn.  Patient instructed to avoid sunlight, if possible.  When exposed to sunlight, patients should wear protective clothing, sunglasses, and sunscreen.  The patient was instructed to call the office immediately if the following severe adverse effects occur:  hearing changes, easy bruising/bleeding, severe headache, or vision changes.  The patient verbalized understanding of the proper use and possible adverse effects of doxycycline.  All of the patient's questions and concerns were addressed. Dapsone Counseling: I discussed with the patient the risks of dapsone including but not limited to hemolytic anemia, agranulocytosis, rashes, methemoglobinemia, kidney failure, peripheral neuropathy, headaches, GI upset, and liver toxicity.  Patients who start dapsone require monitoring including baseline LFTs and weekly CBCs for the first month, then every month thereafter.  The patient verbalized understanding of the proper use and possible adverse effects of dapsone.  All of the patient's questions and concerns were addressed. Topical Retinoid counseling:  Patient advised to apply a pea-sized amount only at bedtime and wait 30 minutes after washing their face before applying.  If too drying, patient may add a non-comedogenic moisturizer. The patient verbalized understanding of the proper use and possible adverse effects of retinoids.  All of the patient's questions and concerns were addressed. Topical Clindamycin Pregnancy And Lactation Text: This medication is Pregnancy Category B and is considered safe during pregnancy. It is unknown if it is excreted in breast milk. Birth Control Pills Counseling: Birth Control Pill Counseling: I discussed with the patient the potential side effects of OCPs including but not limited to increased risk of stroke, heart attack, thrombophlebitis, deep venous thrombosis, hepatic adenomas, breast changes, GI upset, headaches, and depression.  The patient verbalized understanding of the proper use and possible adverse effects of OCPs. All of the patient's questions and concerns were addressed. High Dose Vitamin A Counseling: Side effects reviewed, pt to contact office should one occur. Isotretinoin Pregnancy And Lactation Text: This medication is Pregnancy Category X and is considered extremely dangerous during pregnancy. It is unknown if it is excreted in breast milk. Birth Control Pills Pregnancy And Lactation Text: This medication should be avoided if pregnant and for the first 30 days post-partum. Dapsone Pregnancy And Lactation Text: This medication is Pregnancy Category C and is not considered safe during pregnancy or breast feeding. Isotretinoin Counseling: Patient should get monthly blood tests, not donate blood, not drive at night if vision affected, not share medication, and not undergo elective surgery for 6 months after tx completed. Side effects reviewed, pt to contact office should one occur. Minocycline Counseling: Patient advised regarding possible photosensitivity and discoloration of the teeth, skin, lips, tongue and gums.  Patient instructed to avoid sunlight, if possible.  When exposed to sunlight, patients should wear protective clothing, sunglasses, and sunscreen.  The patient was instructed to call the office immediately if the following severe adverse effects occur:  hearing changes, easy bruising/bleeding, severe headache, or vision changes.  The patient verbalized understanding of the proper use and possible adverse effects of minocycline.  All of the patient's questions and concerns were addressed. Doxycycline Pregnancy And Lactation Text: This medication is Pregnancy Category D and not consider safe during pregnancy. It is also excreted in breast milk but is considered safe for shorter treatment courses. Spironolactone Counseling: Patient advised regarding risks of diarrhea, abdominal pain, hyperkalemia, birth defects (for female patients), liver toxicity and renal toxicity. The patient may need blood work to monitor liver and kidney function and potassium levels while on therapy. The patient verbalized understanding of the proper use and possible adverse effects of spironolactone.  All of the patient's questions and concerns were addressed. Minocycline Pregnancy And Lactation Text: This medication is Pregnancy Category D and not consider safe during pregnancy. It is also excreted in breast milk. Topical Retinoid Pregnancy And Lactation Text: This medication is Pregnancy Category C. It is unknown if this medication is excreted in breast milk. Topical Sulfur Applications Counseling: Topical Sulfur Counseling: Patient counseled that this medication may cause skin irritation or allergic reactions.  In the event of skin irritation, the patient was advised to reduce the amount of the drug applied or use it less frequently.   The patient verbalized understanding of the proper use and possible adverse effects of topical sulfur application.  All of the patient's questions and concerns were addressed. Erythromycin Counseling:  I discussed with the patient the risks of erythromycin including but not limited to GI upset, allergic reaction, drug rash, diarrhea, increase in liver enzymes, and yeast infections.

## 2023-10-19 ENCOUNTER — APPOINTMENT (OUTPATIENT)
Dept: CARDIOLOGY | Facility: CLINIC | Age: 83
End: 2023-10-19

## 2023-10-19 LAB
ALBUMIN SERPL ELPH-MCNC: 3.9 G/DL — SIGNIFICANT CHANGE UP (ref 3.5–5.2)
ALBUMIN SERPL ELPH-MCNC: 3.9 G/DL — SIGNIFICANT CHANGE UP (ref 3.5–5.2)
ALP SERPL-CCNC: 107 U/L — SIGNIFICANT CHANGE UP (ref 30–115)
ALP SERPL-CCNC: 107 U/L — SIGNIFICANT CHANGE UP (ref 30–115)
ALT FLD-CCNC: 13 U/L — SIGNIFICANT CHANGE UP (ref 0–41)
ALT FLD-CCNC: 13 U/L — SIGNIFICANT CHANGE UP (ref 0–41)
ANION GAP SERPL CALC-SCNC: 8 MMOL/L — SIGNIFICANT CHANGE UP (ref 7–14)
ANION GAP SERPL CALC-SCNC: 8 MMOL/L — SIGNIFICANT CHANGE UP (ref 7–14)
AST SERPL-CCNC: 21 U/L — SIGNIFICANT CHANGE UP (ref 0–41)
AST SERPL-CCNC: 21 U/L — SIGNIFICANT CHANGE UP (ref 0–41)
BASOPHILS # BLD AUTO: 0.03 K/UL — SIGNIFICANT CHANGE UP (ref 0–0.2)
BASOPHILS # BLD AUTO: 0.03 K/UL — SIGNIFICANT CHANGE UP (ref 0–0.2)
BASOPHILS NFR BLD AUTO: 0.6 % — SIGNIFICANT CHANGE UP (ref 0–1)
BASOPHILS NFR BLD AUTO: 0.6 % — SIGNIFICANT CHANGE UP (ref 0–1)
BILIRUB SERPL-MCNC: 0.8 MG/DL — SIGNIFICANT CHANGE UP (ref 0.2–1.2)
BILIRUB SERPL-MCNC: 0.8 MG/DL — SIGNIFICANT CHANGE UP (ref 0.2–1.2)
BUN SERPL-MCNC: 26 MG/DL — HIGH (ref 10–20)
BUN SERPL-MCNC: 26 MG/DL — HIGH (ref 10–20)
CALCIUM SERPL-MCNC: 9.4 MG/DL — SIGNIFICANT CHANGE UP (ref 8.4–10.4)
CALCIUM SERPL-MCNC: 9.4 MG/DL — SIGNIFICANT CHANGE UP (ref 8.4–10.4)
CHLORIDE SERPL-SCNC: 98 MMOL/L — SIGNIFICANT CHANGE UP (ref 98–110)
CHLORIDE SERPL-SCNC: 98 MMOL/L — SIGNIFICANT CHANGE UP (ref 98–110)
CHOLEST SERPL-MCNC: 171 MG/DL — SIGNIFICANT CHANGE UP
CHOLEST SERPL-MCNC: 171 MG/DL — SIGNIFICANT CHANGE UP
CO2 SERPL-SCNC: 33 MMOL/L — HIGH (ref 17–32)
CO2 SERPL-SCNC: 33 MMOL/L — HIGH (ref 17–32)
CREAT SERPL-MCNC: 1 MG/DL — SIGNIFICANT CHANGE UP (ref 0.7–1.5)
CREAT SERPL-MCNC: 1 MG/DL — SIGNIFICANT CHANGE UP (ref 0.7–1.5)
EGFR: 56 ML/MIN/1.73M2 — LOW
EGFR: 56 ML/MIN/1.73M2 — LOW
EOSINOPHIL # BLD AUTO: 0.15 K/UL — SIGNIFICANT CHANGE UP (ref 0–0.7)
EOSINOPHIL # BLD AUTO: 0.15 K/UL — SIGNIFICANT CHANGE UP (ref 0–0.7)
EOSINOPHIL NFR BLD AUTO: 2.8 % — SIGNIFICANT CHANGE UP (ref 0–8)
EOSINOPHIL NFR BLD AUTO: 2.8 % — SIGNIFICANT CHANGE UP (ref 0–8)
GLUCOSE SERPL-MCNC: 96 MG/DL — SIGNIFICANT CHANGE UP (ref 70–99)
GLUCOSE SERPL-MCNC: 96 MG/DL — SIGNIFICANT CHANGE UP (ref 70–99)
HCT VFR BLD CALC: 38.3 % — SIGNIFICANT CHANGE UP (ref 37–47)
HCT VFR BLD CALC: 38.3 % — SIGNIFICANT CHANGE UP (ref 37–47)
HDLC SERPL-MCNC: 45 MG/DL — LOW
HDLC SERPL-MCNC: 45 MG/DL — LOW
HGB BLD-MCNC: 11.9 G/DL — LOW (ref 12–16)
HGB BLD-MCNC: 11.9 G/DL — LOW (ref 12–16)
IMM GRANULOCYTES NFR BLD AUTO: 0.2 % — SIGNIFICANT CHANGE UP (ref 0.1–0.3)
IMM GRANULOCYTES NFR BLD AUTO: 0.2 % — SIGNIFICANT CHANGE UP (ref 0.1–0.3)
LIPID PNL WITH DIRECT LDL SERPL: 110 MG/DL — HIGH
LIPID PNL WITH DIRECT LDL SERPL: 110 MG/DL — HIGH
LYMPHOCYTES # BLD AUTO: 1.37 K/UL — SIGNIFICANT CHANGE UP (ref 1.2–3.4)
LYMPHOCYTES # BLD AUTO: 1.37 K/UL — SIGNIFICANT CHANGE UP (ref 1.2–3.4)
LYMPHOCYTES # BLD AUTO: 25.4 % — SIGNIFICANT CHANGE UP (ref 20.5–51.1)
LYMPHOCYTES # BLD AUTO: 25.4 % — SIGNIFICANT CHANGE UP (ref 20.5–51.1)
MAGNESIUM SERPL-MCNC: 2.2 MG/DL — SIGNIFICANT CHANGE UP (ref 1.8–2.4)
MAGNESIUM SERPL-MCNC: 2.2 MG/DL — SIGNIFICANT CHANGE UP (ref 1.8–2.4)
MCHC RBC-ENTMCNC: 29.1 PG — SIGNIFICANT CHANGE UP (ref 27–31)
MCHC RBC-ENTMCNC: 29.1 PG — SIGNIFICANT CHANGE UP (ref 27–31)
MCHC RBC-ENTMCNC: 31.1 G/DL — LOW (ref 32–37)
MCHC RBC-ENTMCNC: 31.1 G/DL — LOW (ref 32–37)
MCV RBC AUTO: 93.6 FL — SIGNIFICANT CHANGE UP (ref 81–99)
MCV RBC AUTO: 93.6 FL — SIGNIFICANT CHANGE UP (ref 81–99)
MONOCYTES # BLD AUTO: 0.66 K/UL — HIGH (ref 0.1–0.6)
MONOCYTES # BLD AUTO: 0.66 K/UL — HIGH (ref 0.1–0.6)
MONOCYTES NFR BLD AUTO: 12.2 % — HIGH (ref 1.7–9.3)
MONOCYTES NFR BLD AUTO: 12.2 % — HIGH (ref 1.7–9.3)
NEUTROPHILS # BLD AUTO: 3.17 K/UL — SIGNIFICANT CHANGE UP (ref 1.4–6.5)
NEUTROPHILS # BLD AUTO: 3.17 K/UL — SIGNIFICANT CHANGE UP (ref 1.4–6.5)
NEUTROPHILS NFR BLD AUTO: 58.8 % — SIGNIFICANT CHANGE UP (ref 42.2–75.2)
NEUTROPHILS NFR BLD AUTO: 58.8 % — SIGNIFICANT CHANGE UP (ref 42.2–75.2)
NON HDL CHOLESTEROL: 126 MG/DL — SIGNIFICANT CHANGE UP
NON HDL CHOLESTEROL: 126 MG/DL — SIGNIFICANT CHANGE UP
NRBC # BLD: 0 /100 WBCS — SIGNIFICANT CHANGE UP (ref 0–0)
NRBC # BLD: 0 /100 WBCS — SIGNIFICANT CHANGE UP (ref 0–0)
PLATELET # BLD AUTO: 190 K/UL — SIGNIFICANT CHANGE UP (ref 130–400)
PLATELET # BLD AUTO: 190 K/UL — SIGNIFICANT CHANGE UP (ref 130–400)
PMV BLD: 11.6 FL — HIGH (ref 7.4–10.4)
PMV BLD: 11.6 FL — HIGH (ref 7.4–10.4)
POTASSIUM SERPL-MCNC: 4.1 MMOL/L — SIGNIFICANT CHANGE UP (ref 3.5–5)
POTASSIUM SERPL-MCNC: 4.1 MMOL/L — SIGNIFICANT CHANGE UP (ref 3.5–5)
POTASSIUM SERPL-SCNC: 4.1 MMOL/L — SIGNIFICANT CHANGE UP (ref 3.5–5)
POTASSIUM SERPL-SCNC: 4.1 MMOL/L — SIGNIFICANT CHANGE UP (ref 3.5–5)
PROT SERPL-MCNC: 6.9 G/DL — SIGNIFICANT CHANGE UP (ref 6–8)
PROT SERPL-MCNC: 6.9 G/DL — SIGNIFICANT CHANGE UP (ref 6–8)
RBC # BLD: 4.09 M/UL — LOW (ref 4.2–5.4)
RBC # BLD: 4.09 M/UL — LOW (ref 4.2–5.4)
RBC # FLD: 16.4 % — HIGH (ref 11.5–14.5)
RBC # FLD: 16.4 % — HIGH (ref 11.5–14.5)
SODIUM SERPL-SCNC: 139 MMOL/L — SIGNIFICANT CHANGE UP (ref 135–146)
SODIUM SERPL-SCNC: 139 MMOL/L — SIGNIFICANT CHANGE UP (ref 135–146)
TRIGL SERPL-MCNC: 85 MG/DL — SIGNIFICANT CHANGE UP
TRIGL SERPL-MCNC: 85 MG/DL — SIGNIFICANT CHANGE UP
WBC # BLD: 5.39 K/UL — SIGNIFICANT CHANGE UP (ref 4.8–10.8)
WBC # BLD: 5.39 K/UL — SIGNIFICANT CHANGE UP (ref 4.8–10.8)
WBC # FLD AUTO: 5.39 K/UL — SIGNIFICANT CHANGE UP (ref 4.8–10.8)
WBC # FLD AUTO: 5.39 K/UL — SIGNIFICANT CHANGE UP (ref 4.8–10.8)

## 2023-10-19 PROCEDURE — 99232 SBSQ HOSP IP/OBS MODERATE 35: CPT

## 2023-10-19 RX ORDER — METOPROLOL TARTRATE 50 MG
75 TABLET ORAL DAILY
Refills: 0 | Status: DISCONTINUED | OUTPATIENT
Start: 2023-10-20 | End: 2023-10-20

## 2023-10-19 RX ORDER — DAPAGLIFLOZIN 10 MG/1
10 TABLET, FILM COATED ORAL EVERY 24 HOURS
Refills: 0 | Status: DISCONTINUED | OUTPATIENT
Start: 2023-10-19 | End: 2023-10-21

## 2023-10-19 RX ORDER — FUROSEMIDE 40 MG
40 TABLET ORAL DAILY
Refills: 0 | Status: DISCONTINUED | OUTPATIENT
Start: 2023-10-20 | End: 2023-10-20

## 2023-10-19 RX ORDER — FUROSEMIDE 40 MG
40 TABLET ORAL ONCE
Refills: 0 | Status: COMPLETED | OUTPATIENT
Start: 2023-10-19 | End: 2023-10-19

## 2023-10-19 RX ADMIN — Medication 81 MILLIGRAM(S): at 11:55

## 2023-10-19 RX ADMIN — Medication 40 MILLIGRAM(S): at 05:31

## 2023-10-19 RX ADMIN — HEPARIN SODIUM 5000 UNIT(S): 5000 INJECTION INTRAVENOUS; SUBCUTANEOUS at 05:32

## 2023-10-19 RX ADMIN — Medication 40 MILLIGRAM(S): at 21:00

## 2023-10-19 RX ADMIN — Medication 50 MILLIGRAM(S): at 05:32

## 2023-10-19 RX ADMIN — HEPARIN SODIUM 5000 UNIT(S): 5000 INJECTION INTRAVENOUS; SUBCUTANEOUS at 21:00

## 2023-10-19 RX ADMIN — HEPARIN SODIUM 5000 UNIT(S): 5000 INJECTION INTRAVENOUS; SUBCUTANEOUS at 15:31

## 2023-10-19 RX ADMIN — SPIRONOLACTONE 25 MILLIGRAM(S): 25 TABLET, FILM COATED ORAL at 05:32

## 2023-10-19 RX ADMIN — CLOPIDOGREL BISULFATE 75 MILLIGRAM(S): 75 TABLET, FILM COATED ORAL at 11:55

## 2023-10-19 RX ADMIN — Medication 112 MICROGRAM(S): at 05:32

## 2023-10-19 RX ADMIN — DAPAGLIFLOZIN 10 MILLIGRAM(S): 10 TABLET, FILM COATED ORAL at 11:55

## 2023-10-20 ENCOUNTER — TRANSCRIPTION ENCOUNTER (OUTPATIENT)
Age: 83
End: 2023-10-20

## 2023-10-20 LAB
ALBUMIN SERPL ELPH-MCNC: 3.8 G/DL — SIGNIFICANT CHANGE UP (ref 3.5–5.2)
ALBUMIN SERPL ELPH-MCNC: 3.8 G/DL — SIGNIFICANT CHANGE UP (ref 3.5–5.2)
ALP SERPL-CCNC: 108 U/L — SIGNIFICANT CHANGE UP (ref 30–115)
ALP SERPL-CCNC: 108 U/L — SIGNIFICANT CHANGE UP (ref 30–115)
ALT FLD-CCNC: 13 U/L — SIGNIFICANT CHANGE UP (ref 0–41)
ALT FLD-CCNC: 13 U/L — SIGNIFICANT CHANGE UP (ref 0–41)
ANION GAP SERPL CALC-SCNC: 12 MMOL/L — SIGNIFICANT CHANGE UP (ref 7–14)
ANION GAP SERPL CALC-SCNC: 12 MMOL/L — SIGNIFICANT CHANGE UP (ref 7–14)
AST SERPL-CCNC: 23 U/L — SIGNIFICANT CHANGE UP (ref 0–41)
AST SERPL-CCNC: 23 U/L — SIGNIFICANT CHANGE UP (ref 0–41)
BASOPHILS # BLD AUTO: 0.03 K/UL — SIGNIFICANT CHANGE UP (ref 0–0.2)
BASOPHILS # BLD AUTO: 0.03 K/UL — SIGNIFICANT CHANGE UP (ref 0–0.2)
BASOPHILS NFR BLD AUTO: 0.6 % — SIGNIFICANT CHANGE UP (ref 0–1)
BASOPHILS NFR BLD AUTO: 0.6 % — SIGNIFICANT CHANGE UP (ref 0–1)
BILIRUB SERPL-MCNC: 0.7 MG/DL — SIGNIFICANT CHANGE UP (ref 0.2–1.2)
BILIRUB SERPL-MCNC: 0.7 MG/DL — SIGNIFICANT CHANGE UP (ref 0.2–1.2)
BUN SERPL-MCNC: 30 MG/DL — HIGH (ref 10–20)
BUN SERPL-MCNC: 30 MG/DL — HIGH (ref 10–20)
CALCIUM SERPL-MCNC: 9.5 MG/DL — SIGNIFICANT CHANGE UP (ref 8.4–10.4)
CALCIUM SERPL-MCNC: 9.5 MG/DL — SIGNIFICANT CHANGE UP (ref 8.4–10.4)
CHLORIDE SERPL-SCNC: 97 MMOL/L — LOW (ref 98–110)
CHLORIDE SERPL-SCNC: 97 MMOL/L — LOW (ref 98–110)
CO2 SERPL-SCNC: 30 MMOL/L — SIGNIFICANT CHANGE UP (ref 17–32)
CO2 SERPL-SCNC: 30 MMOL/L — SIGNIFICANT CHANGE UP (ref 17–32)
CREAT SERPL-MCNC: 1.1 MG/DL — SIGNIFICANT CHANGE UP (ref 0.7–1.5)
CREAT SERPL-MCNC: 1.1 MG/DL — SIGNIFICANT CHANGE UP (ref 0.7–1.5)
EGFR: 50 ML/MIN/1.73M2 — LOW
EGFR: 50 ML/MIN/1.73M2 — LOW
EOSINOPHIL # BLD AUTO: 0.16 K/UL — SIGNIFICANT CHANGE UP (ref 0–0.7)
EOSINOPHIL # BLD AUTO: 0.16 K/UL — SIGNIFICANT CHANGE UP (ref 0–0.7)
EOSINOPHIL NFR BLD AUTO: 3 % — SIGNIFICANT CHANGE UP (ref 0–8)
EOSINOPHIL NFR BLD AUTO: 3 % — SIGNIFICANT CHANGE UP (ref 0–8)
GLUCOSE SERPL-MCNC: 93 MG/DL — SIGNIFICANT CHANGE UP (ref 70–99)
GLUCOSE SERPL-MCNC: 93 MG/DL — SIGNIFICANT CHANGE UP (ref 70–99)
HCT VFR BLD CALC: 41.4 % — SIGNIFICANT CHANGE UP (ref 37–47)
HCT VFR BLD CALC: 41.4 % — SIGNIFICANT CHANGE UP (ref 37–47)
HGB BLD-MCNC: 13 G/DL — SIGNIFICANT CHANGE UP (ref 12–16)
HGB BLD-MCNC: 13 G/DL — SIGNIFICANT CHANGE UP (ref 12–16)
IMM GRANULOCYTES NFR BLD AUTO: 0.2 % — SIGNIFICANT CHANGE UP (ref 0.1–0.3)
IMM GRANULOCYTES NFR BLD AUTO: 0.2 % — SIGNIFICANT CHANGE UP (ref 0.1–0.3)
LYMPHOCYTES # BLD AUTO: 1.44 K/UL — SIGNIFICANT CHANGE UP (ref 1.2–3.4)
LYMPHOCYTES # BLD AUTO: 1.44 K/UL — SIGNIFICANT CHANGE UP (ref 1.2–3.4)
LYMPHOCYTES # BLD AUTO: 27.4 % — SIGNIFICANT CHANGE UP (ref 20.5–51.1)
LYMPHOCYTES # BLD AUTO: 27.4 % — SIGNIFICANT CHANGE UP (ref 20.5–51.1)
MAGNESIUM SERPL-MCNC: 2.3 MG/DL — SIGNIFICANT CHANGE UP (ref 1.8–2.4)
MAGNESIUM SERPL-MCNC: 2.3 MG/DL — SIGNIFICANT CHANGE UP (ref 1.8–2.4)
MCHC RBC-ENTMCNC: 29.2 PG — SIGNIFICANT CHANGE UP (ref 27–31)
MCHC RBC-ENTMCNC: 29.2 PG — SIGNIFICANT CHANGE UP (ref 27–31)
MCHC RBC-ENTMCNC: 31.4 G/DL — LOW (ref 32–37)
MCHC RBC-ENTMCNC: 31.4 G/DL — LOW (ref 32–37)
MCV RBC AUTO: 93 FL — SIGNIFICANT CHANGE UP (ref 81–99)
MCV RBC AUTO: 93 FL — SIGNIFICANT CHANGE UP (ref 81–99)
MONOCYTES # BLD AUTO: 0.65 K/UL — HIGH (ref 0.1–0.6)
MONOCYTES # BLD AUTO: 0.65 K/UL — HIGH (ref 0.1–0.6)
MONOCYTES NFR BLD AUTO: 12.4 % — HIGH (ref 1.7–9.3)
MONOCYTES NFR BLD AUTO: 12.4 % — HIGH (ref 1.7–9.3)
NEUTROPHILS # BLD AUTO: 2.96 K/UL — SIGNIFICANT CHANGE UP (ref 1.4–6.5)
NEUTROPHILS # BLD AUTO: 2.96 K/UL — SIGNIFICANT CHANGE UP (ref 1.4–6.5)
NEUTROPHILS NFR BLD AUTO: 56.4 % — SIGNIFICANT CHANGE UP (ref 42.2–75.2)
NEUTROPHILS NFR BLD AUTO: 56.4 % — SIGNIFICANT CHANGE UP (ref 42.2–75.2)
NRBC # BLD: 0 /100 WBCS — SIGNIFICANT CHANGE UP (ref 0–0)
NRBC # BLD: 0 /100 WBCS — SIGNIFICANT CHANGE UP (ref 0–0)
PLATELET # BLD AUTO: 202 K/UL — SIGNIFICANT CHANGE UP (ref 130–400)
PLATELET # BLD AUTO: 202 K/UL — SIGNIFICANT CHANGE UP (ref 130–400)
PMV BLD: 11.7 FL — HIGH (ref 7.4–10.4)
PMV BLD: 11.7 FL — HIGH (ref 7.4–10.4)
POTASSIUM SERPL-MCNC: 4.3 MMOL/L — SIGNIFICANT CHANGE UP (ref 3.5–5)
POTASSIUM SERPL-MCNC: 4.3 MMOL/L — SIGNIFICANT CHANGE UP (ref 3.5–5)
POTASSIUM SERPL-SCNC: 4.3 MMOL/L — SIGNIFICANT CHANGE UP (ref 3.5–5)
POTASSIUM SERPL-SCNC: 4.3 MMOL/L — SIGNIFICANT CHANGE UP (ref 3.5–5)
PROT SERPL-MCNC: 7.1 G/DL — SIGNIFICANT CHANGE UP (ref 6–8)
PROT SERPL-MCNC: 7.1 G/DL — SIGNIFICANT CHANGE UP (ref 6–8)
RBC # BLD: 4.45 M/UL — SIGNIFICANT CHANGE UP (ref 4.2–5.4)
RBC # BLD: 4.45 M/UL — SIGNIFICANT CHANGE UP (ref 4.2–5.4)
RBC # FLD: 16.5 % — HIGH (ref 11.5–14.5)
RBC # FLD: 16.5 % — HIGH (ref 11.5–14.5)
SODIUM SERPL-SCNC: 139 MMOL/L — SIGNIFICANT CHANGE UP (ref 135–146)
SODIUM SERPL-SCNC: 139 MMOL/L — SIGNIFICANT CHANGE UP (ref 135–146)
WBC # BLD: 5.25 K/UL — SIGNIFICANT CHANGE UP (ref 4.8–10.8)
WBC # BLD: 5.25 K/UL — SIGNIFICANT CHANGE UP (ref 4.8–10.8)
WBC # FLD AUTO: 5.25 K/UL — SIGNIFICANT CHANGE UP (ref 4.8–10.8)
WBC # FLD AUTO: 5.25 K/UL — SIGNIFICANT CHANGE UP (ref 4.8–10.8)

## 2023-10-20 PROCEDURE — 99232 SBSQ HOSP IP/OBS MODERATE 35: CPT

## 2023-10-20 RX ORDER — METOPROLOL TARTRATE 50 MG
25 TABLET ORAL ONCE
Refills: 0 | Status: COMPLETED | OUTPATIENT
Start: 2023-10-20 | End: 2023-10-20

## 2023-10-20 RX ORDER — FUROSEMIDE 40 MG
40 TABLET ORAL DAILY
Refills: 0 | Status: DISCONTINUED | OUTPATIENT
Start: 2023-10-21 | End: 2023-10-21

## 2023-10-20 RX ORDER — METOPROLOL TARTRATE 50 MG
100 TABLET ORAL DAILY
Refills: 0 | Status: DISCONTINUED | OUTPATIENT
Start: 2023-10-21 | End: 2023-10-21

## 2023-10-20 RX ADMIN — SPIRONOLACTONE 25 MILLIGRAM(S): 25 TABLET, FILM COATED ORAL at 05:09

## 2023-10-20 RX ADMIN — HEPARIN SODIUM 5000 UNIT(S): 5000 INJECTION INTRAVENOUS; SUBCUTANEOUS at 05:09

## 2023-10-20 RX ADMIN — DAPAGLIFLOZIN 10 MILLIGRAM(S): 10 TABLET, FILM COATED ORAL at 11:35

## 2023-10-20 RX ADMIN — HEPARIN SODIUM 5000 UNIT(S): 5000 INJECTION INTRAVENOUS; SUBCUTANEOUS at 21:47

## 2023-10-20 RX ADMIN — Medication 75 MILLIGRAM(S): at 05:09

## 2023-10-20 RX ADMIN — Medication 81 MILLIGRAM(S): at 11:35

## 2023-10-20 RX ADMIN — CLOPIDOGREL BISULFATE 75 MILLIGRAM(S): 75 TABLET, FILM COATED ORAL at 11:35

## 2023-10-20 RX ADMIN — ATORVASTATIN CALCIUM 40 MILLIGRAM(S): 80 TABLET, FILM COATED ORAL at 21:47

## 2023-10-20 RX ADMIN — Medication 112 MICROGRAM(S): at 05:10

## 2023-10-20 RX ADMIN — HEPARIN SODIUM 5000 UNIT(S): 5000 INJECTION INTRAVENOUS; SUBCUTANEOUS at 15:23

## 2023-10-20 RX ADMIN — Medication 40 MILLIGRAM(S): at 05:09

## 2023-10-20 RX ADMIN — Medication 25 MILLIGRAM(S): at 11:35

## 2023-10-20 NOTE — DISCHARGE NOTE PROVIDER - NSDCCPCAREPLAN_GEN_ALL_CORE_FT
PRINCIPAL DISCHARGE DIAGNOSIS  Diagnosis: Fluid overload  Assessment and Plan of Treatment: You were evaluated in the hospital for your leg swelling and trouble breathing. You were given medications to remove more fluid, and started on more new medications to help your heart keep fluid off.   After discharge, you will need to:   - Follow up with your primary care doctor within 1-2 weeks  - Follow up with your cardiologist as an outpatient 2 weeks   - Take all the medications you were discharged with, unless otherwise instructed by your healthcare provider(s).   Please follow up with your providers by calling them to make an appointment so that you can see them in 1-2weeks; bring your paperwork from this hospital stay to that visit. You can access your visit information by signing up for an account for the patient portal at [email or web address]   Seek immediate medical attention if you develop fevers, chills, chest pain, shortness of breath, nausea and vomiting, abdominal pain, passing out, weakness or numbness or tingling on one side of your body, or any other concerning signs or symptoms.

## 2023-10-20 NOTE — CDI QUERY NOTE - NSCDIOTHERTXTBX_GEN_ALL_CORE_HH
Based on the below clinical indicators and your professional judgment, can the HFmrEF be further specified?    - HFmrEF associated with hypertension, CAD and valvular heart disease  - HFmrEF associated with CAD and valvular heart disease  - HFmrEF associated with valvular heart disease  - Other, please specify:  - Clinically unable to further specify HFmrEF    CLINICAL INDICATORS:    10/17 Consult Note Adult-Cardiology Fellow- 82 year old female... past medical history of A fib (off anticoagulation due to GIB), s/p Watchman, HTN, HLD... aortic stenosis s/p recent BAV, CAD s/p recent PCI to dLM/pLAD... presented for for worsening dyspnea and fatigue 4 days after discharge... since discharge noticed worsening shortness of breath and orthopnea... reports b/l LE and UE swelling... recently admitted for TAVR. However cath showed significant dLM/pLAD disease... underwent PCI w/BAV... discharged with staged TAVR planned in November.   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 Mildly decreased global left ventricular systolic function.   2. Moderate to severe right atrial enlargement.   3. LV Ejection Fraction by Gaytan's Method with a biplane EF of 45 %.   4. Moderate left ventricular hypertrophy.   5. Moderately enlarged right ventricle.   6. Moderately reduced RV systolic function.   7. Moderately enlarged left atrium.   8. Degenerative mitral valve.   9. Moderate mitral valve regurgitation.  10. Thickening of the anterior and posterior mitral valve leaflets.  11. Moderate-severe tricuspid regurgitation.  12. Mild aortic regurgitation.  13. Moderate to severe aortic valve stenosis.  14. Mild pulmonic valve regurgitation.  15. Estimated pulmonary artery systolic pressure is 55.4 mmHg assuming a   right atrial pressure of 10 mmHg, which is consistent with moderate   pulmonary hypertension.    vital signs flow sheet: BP- 126/67, 132/71, 147/80, 134/86, 157/66, 133/60, 119/59    Thank you,  Julia Mao, RN  [phone]
Based on the clinical indicators below, and your professional judgment, can Atrial Fibrillation be further clarified?    - Patient has persistent Atrial Fibrillation, off AC, rate controlled  - Patient has chronic Atrial Fibrillation, off AC, rate controlled  - Patient has unspecified Atrial Fibrillation, off AC, rate controlled  - Other, please specify:  - Clinically unable to further clarify Atrial Fibrillation     CLINICAL INDICATORS:    Encounter [id number] inpatient 6/27/2023: 6/27 H&P Cardiology- History of Present Illness: 82 year-old female with PMHx... persistent AFib,     This Encounter [id number] inpatient 10/17/2023:  10/17 H&P Adult- 82 year old female ex-smoker with past medical history of A fib (off anticoagulation due to GIB), s/p Watchman; A/P: #Atrial Fibrillation… off AC, rate controlled -c/w metoprolol    10/18 ECG-  Diagnosis Line Atrial fibrillation with premature ventricular or aberrantly conducted complexes; Left axis deviation; Moderate voltage criteria for LVH, may be normal variant, Anteroseptal infarct , age undetermined; Abnormal ECG    Home: metoprolol tartrate 50 mg oral tablet BID  Inpatient: metoprolol tartrate 50 mg oral tablet BID    Thank you,  Julia Mao, RN  [phone]

## 2023-10-20 NOTE — CDI QUERY NOTE - NSCDI_DOCCLARIFY2_GEN_ALL_CORE_FT_PREVIEWDISPLAY
In responding to this request, please exercise your independent professional judgment. The fact that a question is asked does not imply that any particular answer is desired or expected. Documentation clarification is required for compliance.   This form is NOT a part of the permanent Medical Record.
In responding to this request, please exercise your independent professional judgment. The fact that a question is asked does not imply that any particular answer is desired or expected. Documentation clarification is required for compliance.   This form is NOT a part of the permanent Medical Record.

## 2023-10-20 NOTE — DISCHARGE NOTE PROVIDER - CARE PROVIDERS DIRECT ADDRESSES
,DirectAddress_Unknown ,[email or web address],DirectAddress_Unknown

## 2023-10-20 NOTE — DISCHARGE NOTE PROVIDER - NSDCFUSCHEDAPPT_GEN_ALL_CORE_FT
Radha Pfeiffer  [facility] Physician [facility]  CTSURG [address]  Scheduled Appointment: 10/26/2023    Samuel Cardenas  [facility] PreAdmits  Scheduled Appointment: 11/06/2023    Samuel Cardenas  [facility] PreAdmits  Scheduled Appointment: 11/15/2023    Samuel Cardenas  [facility] Physician [facility]  CTSURG HARP [address]  Scheduled Appointment: 11/15/2023    Jose Alan  [facility] Physician [facility]  ELECTROPH [address]  Scheduled Appointment: 11/16/2023

## 2023-10-20 NOTE — DISCHARGE NOTE PROVIDER - HOSPITAL COURSE
Nutrition Assessment   Reason for Consult/Assessment: Nursing nutrition screen (MST), Other (comments)c/s Assessment     Diagnosis and Hx: Reviewed (intractable nausea and vomiting/unintentional weight loss)    Pertinent Nutrition History: Patient reports intermittent nausea/vomiting for years--might have a couple times a month and then go four months without an episode. Otherwise has a good appetite at 3 meals/day plus snacks.    Pertinent Nutrition Information: Patient upset that he is starting a Clear Liquid Diet already--states with previous colonoscopies he could eat solid foods later in the day. Alerted resident, who will discuss with him.                                 Diet Order: Clear liquid                  Diet tolerance: Tolerating with good appetite/intakes recorded (1/29: 100% x 3, 1/30: 100% lunch (cheeseburger, mashed potatoes, salad).)   Food Allergies: None known    Demographic/Anthropometrics Information  Gender: male   Patient Age: 62 year old  Height:   Ht Readings from Last 1 Encounters:   01/28/23 6' 5\" (1.956 m)      Weight:   Wt Readings from Last 1 Encounters:   01/28/23 68.5 kg      BMI:   BMI Readings from Last 1 Encounters:   01/28/23 17.91 kg/m²       Usual Weight: 86.2 kg  % Weight Change: 26.3 kg (27.7%) wt loss x 9/2016 (most recent weight) prior to admit. Patient reports 50# wt loss x 1 year, from 190# to 140#.           Estimated Needs:                                      NFPE  Nutrition Focused Physical Exam  Physical Exam Completed: Yes  Body Fat  Cheek Region (Buccal Fat Pads): Mild/Moderate  Upper Arm Region (Triceps/biceps): Mild/Moderate  Muscle Mass  Temporal Region (Temporalis Muscle): Severe  Collarbone Region (Clavicle Bone, Pectoralis Major, Trapezius Muscle): Severe  Shoulder Region (Deltoid Muscle): Mild/Moderate  Patellar Region (Quadriceps Muscle): Mild/Moderate  Anterior Thigh Region (Quadriceps Muscle): Mild/Moderate  Posterior Calf Region (Gastrocnemius Muscle):  Mild/Moderate                TREATMENT PLAN: Monitoring & Interventions   Intervention: Meals and snacks, Nutrition supplement therapy, Nutrition education         Meals & snacks: Encouraged po intakes as able. Recommend advance diet as soon as medically able.                                                           Nutrition supplement therapy: Will provide one berry Ensure Clear TID with meals.  Nutrition education: Will provide diet education as appropriate after GI testing.    Goal: Increase oral intake to >/equal 75% of meals and supplements   Intervention goal status: Initiated  Time frame to achieve goal: 1-3 days     Dietitian will monitor: Food, beverage, and nutrient intake      Nutrition Diagnosis / PES  Nutrition Diagnosis: Malnutrition  Malnutrition in the context of chronic illness: Non-severe (moderate)  Related to: Decreased intake, Nausea, Vomiting       Malnutrition chronic; non-severe: Mild depletion of body fat, Mild depletion of muscle mass  Primary Nutrition Diagnosis status: New nutrition diagnosis                    81 y/o woman w PMHx of former smoker, AF and off anticoagulation 2/2 GIB, s/p Watchman, HTN, HLD, diverticulosis, non-erosive gastritis, AS s/p recent BAV, CAD s/p recent PCI to dLM/pLAD on 10/12/23, hypothyroidism presented for STRONG/fatigue 4 days after discharge.   Patient w recent admission for TAVR but cath showed significant dLM/pLAD disease and patient underwent PCI with BAV and was discharged with staged TAVR planned in November. Reports being compliant with medications since discharge.    On admission, labs significant for BNP 4262 (elevated from 10/4/23), trop <0.01. EKG similar to prior ; afib with left axis deviation. Concern was for acute onset decompensated HFmrEF in setting of valvulopathy.     While admitted:   Pt underwent diuresis w Lasix IV up to 40mg BID and 40mg PO on d/c   GDMT: metop XL to 100mg, spironolactone 25mg, dapagliflozin 10mg  DAPT for PCI on 10/12/23   Overall: Net neg 7L, from 94.2kg on admission to 89.7kg on d/c      #Acute Decompensated HFmrEF  #Severe Aortic Stenosis s/p BAV  #LMain dx s/p balloon angioplasty with PCI  - IVC 2.6cm <50% collapse in ED, off diuretics at home  - CXR small pleural effusions, EKG without change, trop <0.01 x1  - Weight 94.2 --> 90.4 --> 89.9, negative= -2022/24h   - Start lasix 40mg PO tomorrow  - strict I/O, fluid restrict diet  - C/w spironolactone 25mg and make metoprolol succinate 100mg daily  - Add dapagliflozin 10mg daily    #Atrial Fibrillation s/p watchmann, off AC, rate controlled  -On metoprolol    #CAD s/p PCI  #Essential Hypertension, controlled  - c/w DAPT, STATIN  - LDL= 110 (statin started 1-2 weeks ago) so keep same    #Hypothyroidism  - TSH 0.87, FT4 1.3  - C/w levothyroxine 112ug daily 81 y/o woman w PMHx of former smoker, AF and off anticoagulation 2/2 GIB, s/p Watchman, HTN, HLD, diverticulosis, non-erosive gastritis, AS s/p recent BAV, CAD s/p recent PCI to dLM/pLAD on 10/12/23, hypothyroidism presented for STRONG/fatigue 4 days after discharge.   Patient w recent admission for TAVR but cath showed significant dLM/pLAD disease and patient underwent PCI with BAV and was discharged with staged TAVR planned in November. Reports being compliant with medications since discharge.    On admission, labs significant for BNP 4262 (elevated from 10/4/23), trop <0.01. EKG similar to prior ; afib with left axis deviation. Concern was for acute onset decompensated HFmrEF in setting of valvulopathy.     While admitted:   Pt underwent diuresis w Lasix IV up to 40mg BID and 40mg PO on d/c   GDMT: metop XL to 100mg, spironolactone 25mg, dapagliflozin 10mg  DAPT for PCI on 10/12/23   Overall: Net neg 7L, from 94.2kg on admission to 89.7kg on d/c      #Acute Decompensated HFmrEF  #Severe Aortic Stenosis s/p BAV  #LMain dx s/p balloon angioplasty with PCI  - IVC 2.6cm <50% collapse in ED, off diuretics at home  - CXR small pleural effusions, EKG without change, trop <0.01 x1  - Weight 94.2 --> 90.4 --> 89.9, negative= -2022/24h   - Lasix 40mg PO   - strict I/O, fluid restrict diet  - C/w spironolactone 25mg and make metoprolol succinate 100mg daily  - dapagliflozin 10mg daily    #Atrial Fibrillation s/p watchmann, off AC, rate controlled  -On metoprolol    #CAD s/p PCI  #Essential Hypertension, controlled  - c/w DAPT, STATIN  - LDL= 110 (statin started 1-2 weeks ago) so keep same    #Hypothyroidism  - TSH 0.87, FT4 1.3  - C/w levothyroxine 112ug daily

## 2023-10-20 NOTE — DISCHARGE NOTE PROVIDER - PROVIDER TOKENS
PROVIDER:[TOKEN:[[id number]]] PROVIDER:[TOKEN:[[id number]],FOLLOWUP:[1 week],ESTABLISHEDPATIENT:[T]],PROVIDER:[TOKEN:[[id number]],FOLLOWUP:[2 weeks],ESTABLISHEDPATIENT:[T]]

## 2023-10-20 NOTE — DISCHARGE NOTE PROVIDER - ATTENDING DISCHARGE PHYSICAL EXAMINATION:
Patient seen during rounds. Patient sitting in chair in no acute distress. Regular rate, rhythm. JVP <10cm. Systolic murmur present. CTAB. Trivial edema at ankles.

## 2023-10-20 NOTE — DISCHARGE NOTE PROVIDER - NSDCMRMEDTOKEN_GEN_ALL_CORE_FT
aspirin 81 mg oral delayed release tablet: 1 tab(s) orally once a day  atorvastatin 40 mg oral tablet: 1 tab(s) orally once a day (at bedtime)  clopidogrel 75 mg oral tablet: 1 tab(s) orally once a day  metoprolol tartrate 50 mg oral tablet: 1 tab(s) orally 2 times a day  Synthroid 112 mcg (0.112 mg) oral tablet: 1 tab(s) orally once a day   aspirin 81 mg oral delayed release tablet: 1 tab(s) orally once a day  atorvastatin 40 mg oral tablet: 1 tab(s) orally once a day (at bedtime)  clopidogrel 75 mg oral tablet: 1 tab(s) orally once a day  dapagliflozin 10 mg oral tablet: 1 tab(s) orally every 24 hours  furosemide 40 mg oral tablet: 1 tab(s) orally once a day  metoprolol succinate 100 mg oral tablet, extended release: 1 tab(s) orally once a day  spironolactone 25 mg oral tablet: 1 tab(s) orally once a day  Synthroid 112 mcg (0.112 mg) oral tablet: 1 tab(s) orally once a day

## 2023-10-20 NOTE — DISCHARGE NOTE PROVIDER - CARE PROVIDER_API CALL
NICK ESPINOSA  Phone: [phone]  Fax: [phone]  Follow Up Time:    Yasir Spaulding  Interventional Cardiology  [address] [place] [address], [address]  [place] [address]  Phone: [phone]  Fax: [phone]  Established Patient  Follow Up Time: 1 week    Behuria, Supreeti  Cardiology  [address] [place] [address], [address]  [place] [address]  Phone: [phone]  Fax: [phone]  Established Patient  Follow Up Time: 2 weeks

## 2023-10-21 ENCOUNTER — TRANSCRIPTION ENCOUNTER (OUTPATIENT)
Age: 83
End: 2023-10-21

## 2023-10-21 VITALS
HEART RATE: 84 BPM | DIASTOLIC BLOOD PRESSURE: 59 MMHG | RESPIRATION RATE: 18 BRPM | OXYGEN SATURATION: 95 % | SYSTOLIC BLOOD PRESSURE: 107 MMHG

## 2023-10-21 DIAGNOSIS — I50.9 HEART FAILURE, UNSPECIFIED: ICD-10-CM

## 2023-10-21 LAB
ALBUMIN SERPL ELPH-MCNC: 4 G/DL — SIGNIFICANT CHANGE UP (ref 3.5–5.2)
ALBUMIN SERPL ELPH-MCNC: 4 G/DL — SIGNIFICANT CHANGE UP (ref 3.5–5.2)
ALP SERPL-CCNC: 106 U/L — SIGNIFICANT CHANGE UP (ref 30–115)
ALP SERPL-CCNC: 106 U/L — SIGNIFICANT CHANGE UP (ref 30–115)
ALT FLD-CCNC: 15 U/L — SIGNIFICANT CHANGE UP (ref 0–41)
ALT FLD-CCNC: 15 U/L — SIGNIFICANT CHANGE UP (ref 0–41)
ANION GAP SERPL CALC-SCNC: 10 MMOL/L — SIGNIFICANT CHANGE UP (ref 7–14)
ANION GAP SERPL CALC-SCNC: 10 MMOL/L — SIGNIFICANT CHANGE UP (ref 7–14)
AST SERPL-CCNC: 24 U/L — SIGNIFICANT CHANGE UP (ref 0–41)
AST SERPL-CCNC: 24 U/L — SIGNIFICANT CHANGE UP (ref 0–41)
BASOPHILS # BLD AUTO: 0.04 K/UL — SIGNIFICANT CHANGE UP (ref 0–0.2)
BASOPHILS # BLD AUTO: 0.04 K/UL — SIGNIFICANT CHANGE UP (ref 0–0.2)
BASOPHILS NFR BLD AUTO: 0.8 % — SIGNIFICANT CHANGE UP (ref 0–1)
BASOPHILS NFR BLD AUTO: 0.8 % — SIGNIFICANT CHANGE UP (ref 0–1)
BILIRUB SERPL-MCNC: 0.6 MG/DL — SIGNIFICANT CHANGE UP (ref 0.2–1.2)
BILIRUB SERPL-MCNC: 0.6 MG/DL — SIGNIFICANT CHANGE UP (ref 0.2–1.2)
BUN SERPL-MCNC: 29 MG/DL — HIGH (ref 10–20)
BUN SERPL-MCNC: 29 MG/DL — HIGH (ref 10–20)
CALCIUM SERPL-MCNC: 9.4 MG/DL — SIGNIFICANT CHANGE UP (ref 8.4–10.5)
CALCIUM SERPL-MCNC: 9.4 MG/DL — SIGNIFICANT CHANGE UP (ref 8.4–10.5)
CHLORIDE SERPL-SCNC: 95 MMOL/L — LOW (ref 98–110)
CHLORIDE SERPL-SCNC: 95 MMOL/L — LOW (ref 98–110)
CO2 SERPL-SCNC: 32 MMOL/L — SIGNIFICANT CHANGE UP (ref 17–32)
CO2 SERPL-SCNC: 32 MMOL/L — SIGNIFICANT CHANGE UP (ref 17–32)
CREAT SERPL-MCNC: 0.9 MG/DL — SIGNIFICANT CHANGE UP (ref 0.7–1.5)
CREAT SERPL-MCNC: 0.9 MG/DL — SIGNIFICANT CHANGE UP (ref 0.7–1.5)
EGFR: 63 ML/MIN/1.73M2 — SIGNIFICANT CHANGE UP
EGFR: 63 ML/MIN/1.73M2 — SIGNIFICANT CHANGE UP
EOSINOPHIL # BLD AUTO: 0.11 K/UL — SIGNIFICANT CHANGE UP (ref 0–0.7)
EOSINOPHIL # BLD AUTO: 0.11 K/UL — SIGNIFICANT CHANGE UP (ref 0–0.7)
EOSINOPHIL NFR BLD AUTO: 2.2 % — SIGNIFICANT CHANGE UP (ref 0–8)
EOSINOPHIL NFR BLD AUTO: 2.2 % — SIGNIFICANT CHANGE UP (ref 0–8)
GLUCOSE SERPL-MCNC: 94 MG/DL — SIGNIFICANT CHANGE UP (ref 70–99)
GLUCOSE SERPL-MCNC: 94 MG/DL — SIGNIFICANT CHANGE UP (ref 70–99)
HCT VFR BLD CALC: 42 % — SIGNIFICANT CHANGE UP (ref 37–47)
HCT VFR BLD CALC: 42 % — SIGNIFICANT CHANGE UP (ref 37–47)
HGB BLD-MCNC: 13.1 G/DL — SIGNIFICANT CHANGE UP (ref 12–16)
HGB BLD-MCNC: 13.1 G/DL — SIGNIFICANT CHANGE UP (ref 12–16)
IMM GRANULOCYTES NFR BLD AUTO: 0.4 % — HIGH (ref 0.1–0.3)
IMM GRANULOCYTES NFR BLD AUTO: 0.4 % — HIGH (ref 0.1–0.3)
LYMPHOCYTES # BLD AUTO: 1.45 K/UL — SIGNIFICANT CHANGE UP (ref 1.2–3.4)
LYMPHOCYTES # BLD AUTO: 1.45 K/UL — SIGNIFICANT CHANGE UP (ref 1.2–3.4)
LYMPHOCYTES # BLD AUTO: 29.5 % — SIGNIFICANT CHANGE UP (ref 20.5–51.1)
LYMPHOCYTES # BLD AUTO: 29.5 % — SIGNIFICANT CHANGE UP (ref 20.5–51.1)
MAGNESIUM SERPL-MCNC: 2.5 MG/DL — HIGH (ref 1.8–2.4)
MAGNESIUM SERPL-MCNC: 2.5 MG/DL — HIGH (ref 1.8–2.4)
MCHC RBC-ENTMCNC: 29.2 PG — SIGNIFICANT CHANGE UP (ref 27–31)
MCHC RBC-ENTMCNC: 29.2 PG — SIGNIFICANT CHANGE UP (ref 27–31)
MCHC RBC-ENTMCNC: 31.2 G/DL — LOW (ref 32–37)
MCHC RBC-ENTMCNC: 31.2 G/DL — LOW (ref 32–37)
MCV RBC AUTO: 93.5 FL — SIGNIFICANT CHANGE UP (ref 81–99)
MCV RBC AUTO: 93.5 FL — SIGNIFICANT CHANGE UP (ref 81–99)
MONOCYTES # BLD AUTO: 0.47 K/UL — SIGNIFICANT CHANGE UP (ref 0.1–0.6)
MONOCYTES # BLD AUTO: 0.47 K/UL — SIGNIFICANT CHANGE UP (ref 0.1–0.6)
MONOCYTES NFR BLD AUTO: 9.6 % — HIGH (ref 1.7–9.3)
MONOCYTES NFR BLD AUTO: 9.6 % — HIGH (ref 1.7–9.3)
NEUTROPHILS # BLD AUTO: 2.82 K/UL — SIGNIFICANT CHANGE UP (ref 1.4–6.5)
NEUTROPHILS # BLD AUTO: 2.82 K/UL — SIGNIFICANT CHANGE UP (ref 1.4–6.5)
NEUTROPHILS NFR BLD AUTO: 57.5 % — SIGNIFICANT CHANGE UP (ref 42.2–75.2)
NEUTROPHILS NFR BLD AUTO: 57.5 % — SIGNIFICANT CHANGE UP (ref 42.2–75.2)
NRBC # BLD: 0 /100 WBCS — SIGNIFICANT CHANGE UP (ref 0–0)
NRBC # BLD: 0 /100 WBCS — SIGNIFICANT CHANGE UP (ref 0–0)
PLATELET # BLD AUTO: 221 K/UL — SIGNIFICANT CHANGE UP (ref 130–400)
PLATELET # BLD AUTO: 221 K/UL — SIGNIFICANT CHANGE UP (ref 130–400)
PMV BLD: 11.5 FL — HIGH (ref 7.4–10.4)
PMV BLD: 11.5 FL — HIGH (ref 7.4–10.4)
POTASSIUM SERPL-MCNC: 4.4 MMOL/L — SIGNIFICANT CHANGE UP (ref 3.5–5)
POTASSIUM SERPL-MCNC: 4.4 MMOL/L — SIGNIFICANT CHANGE UP (ref 3.5–5)
POTASSIUM SERPL-SCNC: 4.4 MMOL/L — SIGNIFICANT CHANGE UP (ref 3.5–5)
POTASSIUM SERPL-SCNC: 4.4 MMOL/L — SIGNIFICANT CHANGE UP (ref 3.5–5)
PROT SERPL-MCNC: 7 G/DL — SIGNIFICANT CHANGE UP (ref 6–8)
PROT SERPL-MCNC: 7 G/DL — SIGNIFICANT CHANGE UP (ref 6–8)
RBC # BLD: 4.49 M/UL — SIGNIFICANT CHANGE UP (ref 4.2–5.4)
RBC # BLD: 4.49 M/UL — SIGNIFICANT CHANGE UP (ref 4.2–5.4)
RBC # FLD: 16.5 % — HIGH (ref 11.5–14.5)
RBC # FLD: 16.5 % — HIGH (ref 11.5–14.5)
SODIUM SERPL-SCNC: 137 MMOL/L — SIGNIFICANT CHANGE UP (ref 135–146)
SODIUM SERPL-SCNC: 137 MMOL/L — SIGNIFICANT CHANGE UP (ref 135–146)
WBC # BLD: 4.91 K/UL — SIGNIFICANT CHANGE UP (ref 4.8–10.8)
WBC # BLD: 4.91 K/UL — SIGNIFICANT CHANGE UP (ref 4.8–10.8)
WBC # FLD AUTO: 4.91 K/UL — SIGNIFICANT CHANGE UP (ref 4.8–10.8)
WBC # FLD AUTO: 4.91 K/UL — SIGNIFICANT CHANGE UP (ref 4.8–10.8)

## 2023-10-21 PROCEDURE — 99239 HOSP IP/OBS DSCHRG MGMT >30: CPT

## 2023-10-21 RX ORDER — LEVOTHYROXINE SODIUM 125 MCG
1 TABLET ORAL
Qty: 90 | Refills: 0
Start: 2023-10-21 | End: 2024-01-18

## 2023-10-21 RX ORDER — ATORVASTATIN CALCIUM 80 MG/1
1 TABLET, FILM COATED ORAL
Qty: 0 | Refills: 0 | DISCHARGE
Start: 2023-10-21

## 2023-10-21 RX ORDER — SPIRONOLACTONE 25 MG/1
1 TABLET, FILM COATED ORAL
Qty: 90 | Refills: 0
Start: 2023-10-21 | End: 2024-01-18

## 2023-10-21 RX ORDER — DAPAGLIFLOZIN 10 MG/1
1 TABLET, FILM COATED ORAL
Qty: 90 | Refills: 0
Start: 2023-10-21 | End: 2024-01-18

## 2023-10-21 RX ORDER — CLOPIDOGREL BISULFATE 75 MG/1
1 TABLET, FILM COATED ORAL
Qty: 90 | Refills: 0
Start: 2023-10-21 | End: 2024-01-18

## 2023-10-21 RX ORDER — ATORVASTATIN CALCIUM 80 MG/1
1 TABLET, FILM COATED ORAL
Qty: 90 | Refills: 0
Start: 2023-10-21 | End: 2024-01-18

## 2023-10-21 RX ORDER — LEVOTHYROXINE SODIUM 125 MCG
1 TABLET ORAL
Refills: 0 | DISCHARGE

## 2023-10-21 RX ORDER — FUROSEMIDE 40 MG
1 TABLET ORAL
Qty: 90 | Refills: 0
Start: 2023-10-21 | End: 2024-01-18

## 2023-10-21 RX ORDER — METOPROLOL TARTRATE 50 MG
1 TABLET ORAL
Qty: 90 | Refills: 0
Start: 2023-10-21 | End: 2024-01-18

## 2023-10-21 RX ORDER — ASPIRIN/CALCIUM CARB/MAGNESIUM 324 MG
1 TABLET ORAL
Qty: 90 | Refills: 0
Start: 2023-10-21 | End: 2024-01-18

## 2023-10-21 RX ADMIN — HEPARIN SODIUM 5000 UNIT(S): 5000 INJECTION INTRAVENOUS; SUBCUTANEOUS at 05:17

## 2023-10-21 RX ADMIN — DAPAGLIFLOZIN 10 MILLIGRAM(S): 10 TABLET, FILM COATED ORAL at 12:17

## 2023-10-21 RX ADMIN — Medication 81 MILLIGRAM(S): at 12:18

## 2023-10-21 RX ADMIN — Medication 40 MILLIGRAM(S): at 05:16

## 2023-10-21 RX ADMIN — Medication 100 MILLIGRAM(S): at 05:16

## 2023-10-21 RX ADMIN — CLOPIDOGREL BISULFATE 75 MILLIGRAM(S): 75 TABLET, FILM COATED ORAL at 12:18

## 2023-10-21 RX ADMIN — Medication 112 MICROGRAM(S): at 05:17

## 2023-10-21 RX ADMIN — SPIRONOLACTONE 25 MILLIGRAM(S): 25 TABLET, FILM COATED ORAL at 05:16

## 2023-10-21 NOTE — PROGRESS NOTE ADULT - ASSESSMENT
82 year old female ex-smoker with past medical history of A fib (off anticoagulation due to GIB), s/p Watchman, HTN, HLD, diverticulosis, non-erosive gastritis, aortic stenosis s/p recent BAV, CAD s/p recent PCI to dLM/pLAD, hypothyroidism presented for worsening dyspnea and fatigue 4 days after discharge    #Acute Decompensated HFmrEF  #Severe Aortic Stenosis s/p BAV  #LMain dx s/p balloon angioplasty with PCI  - IVC 2.6cm <50% collapse in ED, off diuretics at home  - CXR small pleural effusions, EKG without change, trop <0.01 x1  - Weight 94.2 --> 90.4 --> 89.9, now in net equal balance  - C/w spironolactone 25mg and make metoprolol succinate 100mg daily  - C/w dapagliflozin 10mg daily  - Will dc on Lasix 40 mg PO once daily     #Atrial Fibrillation s/p watchmann, off AC, rate controlled  -On metoprolol    #CAD s/p PCI  #Essential Hypertension, controlled  - c/w DAPT, STATIN  - LDL= 110 (statin started 1-2 weeks ago), will continue    #Hypothyroidism  - TSH 0.87, FT4 1.3  - C/w levothyroxine 112ug daily    #DVT ppx: hsq  #GI ppx: n/a  #Diet: 1200mL fl restrict  #Activity: IAT  #Dispo : d/c home today    
An 82 year old female ex-smoker with past medical history of A fib (off anticoagulation due to GIB), s/p Watchman, HTN, HLD, diverticulosis, non-erosive gastritis, aortic stenosis s/p recent BAV, CAD s/p recent PCI to dLM/pLAD, hypothyroidism presented for for worsening dyspnea and fatigue 4 days after discharge    #Acute Decompensated HFmrEF  #Severe Aortic Stenosis s/p BAV  #LMain dx s/p balloon angioplasty with PCI  - IVC 2.6cm <50% collapse in ED, off diuretics at home  - CXR small pleural effusions, EKG without change, trop <0.01 x1  - Weight 94.2 --> 90.4, negative= -2867/24h and -800cc in AM  - Make lasix 40mg IVP daily  - strict I/O, fluid restrict diet  - C/w spironolactone 25mg and make metoprolol succinate 75mg daily  - Add dapagliflozin 10mg daily    #Atrial Fibrillation s/p watchmann, off AC, rate controlled  -On metoprolol    #CAD s/p PCI  #Essential Hypertension, controlled  - c/w DAPT, STATIN  - LDL= 110 (statin started 1-2 weeks ago) so keep same    #Hypothyroidism  - TSH 0.87, FT4 1.3  - C/w levothyroxine 112ug daily    #DVT ppx: hsq  #GI ppx: n/a  #Diet: 1200mL fl restrict  #Activity: IAT  #Dispo: 4T    Possible discharge in the next 1-2 days  
An 82 year old female ex-smoker with past medical history of A fib (off anticoagulation due to GIB), s/p Watchman, HTN, HLD, diverticulosis, non-erosive gastritis, aortic stenosis s/p recent BAV, CAD s/p recent PCI to dLM/pLAD, hypothyroidism presented for worsening dyspnea and fatigue 4 days after discharge    #Acute Decompensated HFmrEF  #Severe Aortic Stenosis s/p BAV  #LMain dx s/p balloon angioplasty with PCI  - IVC 2.6cm <50% collapse in ED, off diuretics at home  - CXR small pleural effusions, EKG without change, trop <0.01 x1  - Weight 94.2 --> 90.4 --> 89.9, negative= -2022/24h   - Start lasix 40mg PO tomorrow  - strict I/O, fluid restrict diet  - C/w spironolactone 25mg and make metoprolol succinate 100mg daily  - Add dapagliflozin 10mg daily    #Atrial Fibrillation s/p watchmann, off AC, rate controlled  -On metoprolol    #CAD s/p PCI  #Essential Hypertension, controlled  - c/w DAPT, STATIN  - LDL= 110 (statin started 1-2 weeks ago) so keep same    #Hypothyroidism  - TSH 0.87, FT4 1.3  - C/w levothyroxine 112ug daily    #DVT ppx: hsq  #GI ppx: n/a  #Diet: 1200mL fl restrict  #Activity: IAT  #Dispo: 4T    Discharge tomorrow  
An 82 year old female ex-smoker with past medical history of A fib (off anticoagulation due to GIB), s/p Watchman, HTN, HLD, diverticulosis, non-erosive gastritis, aortic stenosis s/p recent BAV, CAD s/p recent PCI to dLM/pLAD, hypothyroidism presented for for worsening dyspnea and fatigue 4 days after discharge    #Acute Decompensated HFmrEF  #Severe Aortic Stenosis s/p BAV  #LMain dx s/p balloon angioplasty with PCI  - IVC 2.6cm <50% collapse in ED, off diuretics at home  - CXR small pleural effusions  - EKG without change, trop <0.01 x1  - c/w lasix 40mg IVP BID  - strict I/O, fluid restrict diet  - Starting spironolactone 25mg, switching to metoprolol succinate 50mg daily    #Atrial Fibrillation s/p watchmann, off AC, rate controlled  -On metoprolol    #CAD s/p PCI  #Essential Hypertension, controlled  - c/w DAPT, STATIN  - FU lipid profile    #Hypothyroidism  - TSH 0.87, FT4 1.3.  - C/w levothyroxine 112ug daily    #DVT ppx: hsq  #GI ppx: n/a  #Diet: 1200mL fl restrict  #Activity: IAT  #Dispo: 4T

## 2023-10-21 NOTE — PROGRESS NOTE ADULT - ATTENDING COMMENTS
81 y/o female ICM, recent LM PCI, CAD, severe AS s/p BAV for plans for TAVR who presents with ADHF. On exam today she is warm, well perfused, and hypervolemic based on elevated JVP ~11 and pitting edema but improved compared to yesterday. She is tolerating uptitration in GDMT.    Plan:  - IV lasix 40 mg for net negative goal 2L. extra dose as needed. possibly switch to PO tomorrow  - GDMT: increase metop XL to 75 mg. continue spironolactone 25 mg.   - Start dapagliflozin 10 mg  - Continue DAPT for recent PCI  - Continue atorvastatin  - Not on AC for AF  - TAVR as outpatient    Haroldo Manzanares MD  Interventional Heart Failure
81 y/o female ICM, recent LM PCI, CAD, severe AS s/p BAV for plans for TAVR who presents with ADHF. On exam today she is warm, well perfused, and euvolemic. She is tolerating uptitration in GDMT. Discharge today    Plan:  - PO lasix 40 mg daily   - GDMT: metop  mg, spironolactone 25 mg, dapagliflozin 10 mg  - Continue DAPT for recent PCI  - Continue atorvastatin  - Not on AC for AF, has Watchman  - TAVR as outpatient  - Discharge today    Haroldo Manzanares MD  Interventional Heart Failure
81 y/o female ICM, recent LM PCI, CAD, severe AS s/p BAV for plans for TAVR who presents with ADHF. On exam today she is warm, well perfused, and hypervolemic based on elevated JVP ~13 and pitting edema. She denies any CP and at this time reports feeling better after one dose of IV lasix. We will continue diuresis and make adjustments to her GDMT.    Plan:  - IV lasix 40 mg BID for net negative goal 2L  - GDMT: switch to metop XL 50 mg. start spironolactone 25 mg.   - Continue DAPT for recent PCI  - Continue atorvastatin  - Not on AC for AF  - TAVR as outpatient    Haroldo Manzanares MD  Interventional Heart Failure
83 y/o female ICM, recent LM PCI, CAD, severe AS s/p BAV for plans for TAVR who presents with ADHF. On exam today she is warm, well perfused, and euvolemic. She is tolerating uptitration in GDMT. Anticipate discharge tomorrow    Plan:  - Received IV lasix today and will switch to PO lasix 40 mg daily tomorrow  - GDMT: increase metop XL to 100 mg. continue spironolactone 25 mg, dapagliflozin 10 mg  - Continue DAPT for recent PCI  - Continue atorvastatin  - Not on AC for AF, has Watchman  - TAVR as outpatient  - Discharge tomorrow    Haroldo Manzanares MD  Interventional Heart Failure

## 2023-10-21 NOTE — DISCHARGE NOTE NURSING/CASE MANAGEMENT/SOCIAL WORK - NSDCPEFALRISK_GEN_ALL_CORE
For information on Fall & Injury Prevention, visit: [email or web address] OR  [email or web address] OR  https://www.cdc.gov/steadi/patient.html

## 2023-10-21 NOTE — DISCHARGE NOTE NURSING/CASE MANAGEMENT/SOCIAL WORK - PATIENT PORTAL LINK FT
You can access the FollowMyHealth Patient Portal offered by [facility] by registering at the following website: [email or web address]. By joining [employer]’s FollowMyHealth portal, you will also be able to view your health information using other applications (apps) compatible with our system.

## 2023-10-21 NOTE — PROGRESS NOTE ADULT - NS ATTEST RISK PROBLEM GEN_ALL_CORE FT
Titration of heart failure medications and diuretics

## 2023-10-21 NOTE — PROGRESS NOTE ADULT - SUBJECTIVE AND OBJECTIVE BOX
24H events:    Patient is a 82y old Female who presents with a chief complaint of Volume overload (17 Oct 2023 23:13)    Primary diagnosis of Fluid overload    Today is hospital day 1d. This morning patient was seen and examined at bedside, resting comfortably in bed.    No acute or major events overnight. She endorses significant improvement of her symptoms.    Code Status: Full    PAST MEDICAL & SURGICAL HISTORY  Hypercholesteremia    Hypothyroid    Atrial fibrillation    HTN (hypertension)    Former smoker    Aortic stenosis    H/O: obesity    S/P appendectomy    Presence of Watchman left atrial appendage closure device    Presence of Amulet left atrial appendage closure device      SOCIAL HISTORY:  Social History:      ALLERGIES:  penicillins (Rash)    MEDICATIONS:  STANDING MEDICATIONS  aspirin enteric coated 81 milliGRAM(s) Oral daily  atorvastatin 40 milliGRAM(s) Oral at bedtime  chlorhexidine 2% Cloths 1 Application(s) Topical daily  clopidogrel Tablet 75 milliGRAM(s) Oral daily  furosemide   Injectable 40 milliGRAM(s) IV Push two times a day  heparin   Injectable 5000 Unit(s) SubCutaneous every 8 hours  levothyroxine 112 MICROGram(s) Oral daily  metoprolol succinate ER 50 milliGRAM(s) Oral daily  spironolactone 25 milliGRAM(s) Oral daily    PRN MEDICATIONS    VITALS:   T(F): 98  HR: 84  BP: 157/66  RR: 18  SpO2: 92%    PHYSICAL EXAM:  GENERAL:   ( x ) NAD, lying in bed comfortably     (  ) obtunded     (  ) lethargic     (  ) somnolent    HEAD:   ( x ) Atraumatic     (  ) hematoma     (  ) laceration (specify location:       )     NECK:  ( x ) Supple     (  ) neck stiffness     (  ) nuchal rigidity     (  )  no JVD     ( x ) JVD present     HEART:  Rate -->     (x  ) normal rate     (  ) bradycardic     (  ) tachycardic  Rhythm -->     (  x) regular     (  ) regularly irregular     (  ) irregularly irregular  Murmurs -->     ( x ) normal s1s2     (x  ) systolic murmur     (  ) diastolic murmur     (  ) continuous murmur      (  ) S3 present     (  ) S4 present    LUNGS:   (x  )Unlabored respirations     (  ) tachypnea  ( x ) B/L air entry     (  ) decreased breath sounds in:  (location     )    (  ) no adventitious sound     ( x) crackles     (  ) wheezing      (  ) rhonchi      (specify location:       )  (  ) chest wall tenderness (specify location:       )    ABDOMEN:   ( x ) Soft     (  ) tense   |   ( x ) nondistended     (  ) distended   |   ( x ) +BS     (  ) hypoactive bowel sounds     (  ) hyperactive bowel sounds  ( x ) nontender     (  ) RUQ tenderness     (  ) RLQ tenderness     (  ) LLQ tenderness     (  ) epigastric tenderness     (  ) diffuse tenderness  (  ) Splenomegaly      (  ) Hepatomegaly      (  ) Jaundice     (  ) ecchymosis     EXTREMITIES:  (  ) Normal     (  ) Rash     (  ) ecchymosis     (  ) varicose veins      ( x ) pitting edema     (  ) non-pitting edema   (  ) ulceration     (  ) gangrene:     (location:     )    NERVOUS SYSTEM:    (x  ) A&Ox3     (  ) confused     (  ) lethargic  CN II-XII:     (x  ) Intact     (  ) deficits found     (Specify:     )   Upper extremities:     ( x ) no sensorimotor deficits     (  ) weakness     (  ) loss of proprioception/vibration     (  ) loss of touch/temperature (specify:    )  Lower extremities:     (x ) no sensorimotor deficits     (  ) weakness     (  ) loss of proprioception/vibration     (  ) loss of touch/temperature (specify:    )    SKIN:   (  x) No rashes or lesions     (  ) maculopapular rash     (  ) pustules     (  ) vesicles     (  ) ulcer     (  ) ecchymosis     (specify location:     )      (  ) Indwelling Hdez Catheter:   Date insterted:    Reason (  ) Critical illness     (  ) urinary retention    (  ) Accurate Ins/Outs Monitoring     (  ) CMO patient    (  ) Central Line:   Date inserted:  Location: (  ) Right IJ     (  ) Left IJ     (  ) Right Fem     (  ) Left Fem    (  ) SPC        (  ) pigtail       (  ) PEG tube       (  ) colostomy       (  ) jejunostomy  (  ) U-Dall    LABS:                        11.8   6.44  )-----------( 154      ( 18 Oct 2023 05:25 )             38.5     10-18    141  |  100  |  19  ----------------------------<  81  3.6   |  29  |  0.7    Ca    9.0      18 Oct 2023 05:25  Phos  4.3     10-18  Mg     2.0     10-18    TPro  6.4  /  Alb  3.8  /  TBili  0.8  /  DBili  x   /  AST  21  /  ALT  13  /  AlkPhos  101  10-18      Urinalysis Basic - ( 18 Oct 2023 05:25 )    Color: x / Appearance: x / SG: x / pH: x  Gluc: 81 mg/dL / Ketone: x  / Bili: x / Urobili: x   Blood: x / Protein: x / Nitrite: x   Leuk Esterase: x / RBC: x / WBC x   Sq Epi: x / Non Sq Epi: x / Bacteria: x        Lactate, Blood: 1.0 mmol/L (10-18-23 @ 05:25)  Troponin T, Serum: <0.01 ng/mL (10-17-23 @ 20:15)      CARDIAC MARKERS ( 17 Oct 2023 20:15 )  x     / <0.01 ng/mL / x     / x     / x          RADIOLOGY:          
24H events:    Patient is a 82y old Female who presents with a chief complaint of Volume overload (18 Oct 2023 15:41)    Primary diagnosis of Fluid overload    Today is hospital day 2d. This morning patient was seen and examined at bedside, resting comfortably in bed. She mentions great improvement clinically and is able to walk without SOB.  No acute or major events overnight.    Code Status: FULL    Family communication:  Contact date:  Name of person contacted:  Relationship to patient:  Communication details:  What matters most:    PAST MEDICAL & SURGICAL HISTORY  Hypercholesteremia    Hypothyroid    Atrial fibrillation    HTN (hypertension)    Former smoker    Aortic stenosis    H/O: obesity    S/P appendectomy    Presence of Watchman left atrial appendage closure device    Presence of Amulet left atrial appendage closure device      SOCIAL HISTORY:  Social History:      ALLERGIES:  penicillins (Rash)    MEDICATIONS:  STANDING MEDICATIONS  aspirin enteric coated 81 milliGRAM(s) Oral daily  atorvastatin 40 milliGRAM(s) Oral at bedtime  chlorhexidine 2% Cloths 1 Application(s) Topical daily  clopidogrel Tablet 75 milliGRAM(s) Oral daily  dapagliflozin 10 milliGRAM(s) Oral every 24 hours  heparin   Injectable 5000 Unit(s) SubCutaneous every 8 hours  levothyroxine 112 MICROGram(s) Oral daily  spironolactone 25 milliGRAM(s) Oral daily    PRN MEDICATIONS    VITALS:   T(F): 97.5  HR: 82  BP: 123/67  RR: 18  SpO2: 95%    PHYSICAL EXAM:  GENERAL:   ( x ) NAD, lying in bed comfortably     (  ) obtunded     (  ) lethargic     (  ) somnolent    HEAD:   ( x ) Atraumatic     (  ) hematoma     (  ) laceration (specify location:       )     NECK:  ( x ) Supple     (  ) neck stiffness     (  ) nuchal rigidity     (  )  no JVD     ( x ) JVD present     HEART:  Rate -->     (x  ) normal rate     (  ) bradycardic     (  ) tachycardic  Rhythm -->     (  x) regular     (  ) regularly irregular     (  ) irregularly irregular  Murmurs -->     ( x ) normal s1s2     (x  ) systolic murmur     (  ) diastolic murmur     (  ) continuous murmur      (  ) S3 present     (  ) S4 present    LUNGS:   (x  )Unlabored respirations     (  ) tachypnea  ( x ) B/L air entry     (  ) decreased breath sounds in:  (location     )    (  ) no adventitious sound     ( x) crackles (improving)     (  ) wheezing      (  ) rhonchi      (specify location:       )  (  ) chest wall tenderness (specify location:       )    ABDOMEN:   ( x ) Soft     (  ) tense   |   ( x ) nondistended     (  ) distended   |   ( x ) +BS     (  ) hypoactive bowel sounds     (  ) hyperactive bowel sounds  ( x ) nontender     (  ) RUQ tenderness     (  ) RLQ tenderness     (  ) LLQ tenderness     (  ) epigastric tenderness     (  ) diffuse tenderness  (  ) Splenomegaly      (  ) Hepatomegaly      (  ) Jaundice     (  ) ecchymosis     EXTREMITIES:  (  ) Normal     (  ) Rash     (  ) ecchymosis     (  ) varicose veins      ( x ) pitting edema (improving)     (  ) non-pitting edema   (  ) ulceration     (  ) gangrene:     (location:     )    NERVOUS SYSTEM:    (x  ) A&Ox3     (  ) confused     (  ) lethargic  CN II-XII:     (x  ) Intact     (  ) deficits found     (Specify:     )   Upper extremities:     ( x ) no sensorimotor deficits     (  ) weakness     (  ) loss of proprioception/vibration     (  ) loss of touch/temperature (specify:    )  Lower extremities:     (x ) no sensorimotor deficits     (  ) weakness     (  ) loss of proprioception/vibration     (  ) loss of touch/temperature (specify:    )    SKIN:   (  x) No rashes or lesions     (  ) maculopapular rash     (  ) pustules     (  ) vesicles     (  ) ulcer     (  ) ecchymosis     (specify location:     )        (  ) Indwelling Hdez Catheter:   Date insterted:    Reason (  ) Critical illness     (  ) urinary retention    (  ) Accurate Ins/Outs Monitoring     (  ) CMO patient    (  ) Central Line:   Date inserted:  Location: (  ) Right IJ     (  ) Left IJ     (  ) Right Fem     (  ) Left Fem    (  ) SPC        (  ) pigtail       (  ) PEG tube       (  ) colostomy       (  ) jejunostomy  (  ) U-Dall    LABS:                        11.9   5.39  )-----------( 190      ( 19 Oct 2023 05:25 )             38.3     10-19    139  |  98  |  26<H>  ----------------------------<  96  4.1   |  33<H>  |  1.0    Ca    9.4      19 Oct 2023 05:25  Phos  4.3     10-18  Mg     2.2     10-19    TPro  6.9  /  Alb  3.9  /  TBili  0.8  /  DBili  x   /  AST  21  /  ALT  13  /  AlkPhos  107  10-19      Urinalysis Basic - ( 19 Oct 2023 05:25 )    Color: x / Appearance: x / SG: x / pH: x  Gluc: 96 mg/dL / Ketone: x  / Bili: x / Urobili: x   Blood: x / Protein: x / Nitrite: x   Leuk Esterase: x / RBC: x / WBC x   Sq Epi: x / Non Sq Epi: x / Bacteria: x            CARDIAC MARKERS ( 17 Oct 2023 20:15 )  x     / <0.01 ng/mL / x     / x     / x          RADIOLOGY:          
24H events:    Patient is a 82y old Female who presents with a chief complaint of Volume overload (19 Oct 2023 15:39). She was diagnosed with acute HF exacerbation.    Primary diagnosis of Fluid overload    Today is hospital day 3d. This morning patient was seen and examined at bedside, resting comfortably in bed. Regular BM, good night sleep. Good appetite. No SOB.   No acute or major events overnight.    Code Status: Full      PAST MEDICAL & SURGICAL HISTORY  Hypercholesteremia    Hypothyroid    Atrial fibrillation    HTN (hypertension)    Former smoker    Aortic stenosis    H/O: obesity    S/P appendectomy    Presence of Watchman left atrial appendage closure device    Presence of Amulet left atrial appendage closure device      SOCIAL HISTORY:  Social History:      ALLERGIES:  penicillins (Rash)    MEDICATIONS:  STANDING MEDICATIONS  aspirin enteric coated 81 milliGRAM(s) Oral daily  atorvastatin 40 milliGRAM(s) Oral at bedtime  chlorhexidine 2% Cloths 1 Application(s) Topical daily  clopidogrel Tablet 75 milliGRAM(s) Oral daily  dapagliflozin 10 milliGRAM(s) Oral every 24 hours  heparin   Injectable 5000 Unit(s) SubCutaneous every 8 hours  levothyroxine 112 MICROGram(s) Oral daily  spironolactone 25 milliGRAM(s) Oral daily    PRN MEDICATIONS    VITALS:   T(F): 98.7  HR: 88  BP: 119/71  RR: 20  SpO2: 95%    PHYSICAL EXAM:  GENERAL:   ( x ) NAD, lying in bed comfortably     (  ) obtunded     (  ) lethargic     (  ) somnolent    HEAD:   ( x ) Atraumatic     (  ) hematoma     (  ) laceration (specify location:       )     NECK:  ( x ) Supple     (  ) neck stiffness     (  ) nuchal rigidity     (  )  no JVD     ( x ) JVD present     HEART:  Rate -->     (x  ) normal rate     (  ) bradycardic     (  ) tachycardic  Rhythm -->     (  ) regular     (  ) regularly irregular     ( x ) irregularly irregular  Murmurs -->     ( x ) normal s1s2     (x  ) systolic murmur     (  ) diastolic murmur     (  ) continuous murmur      (  ) S3 present     (  ) S4 present    LUNGS:   (x  )Unlabored respirations     (  ) tachypnea  ( x ) B/L air entry     (  ) decreased breath sounds in:  (location     )    (  ) no adventitious sound     ( x) crackles (improving)     (  ) wheezing      (  ) rhonchi      (specify location:       )  (  ) chest wall tenderness (specify location:       )    ABDOMEN:   ( x ) Soft     (  ) tense   |   ( x ) nondistended     (  ) distended   |   ( x ) +BS     (  ) hypoactive bowel sounds     (  ) hyperactive bowel sounds  ( x ) nontender     (  ) RUQ tenderness     (  ) RLQ tenderness     (  ) LLQ tenderness     (  ) epigastric tenderness     (  ) diffuse tenderness  (  ) Splenomegaly      (  ) Hepatomegaly      (  ) Jaundice     (  ) ecchymosis     EXTREMITIES:  (  ) Normal     (  ) Rash     (  ) ecchymosis     (  ) varicose veins      ( x ) pitting edema (improving)     (  ) non-pitting edema   (  ) ulceration     (  ) gangrene:     (location:     )    NERVOUS SYSTEM:    (x  ) A&Ox3     (  ) confused     (  ) lethargic  CN II-XII:     (x  ) Intact     (  ) deficits found     (Specify:     )   Upper extremities:     ( x ) no sensorimotor deficits     (  ) weakness     (  ) loss of proprioception/vibration     (  ) loss of touch/temperature (specify:    )  Lower extremities:     (x ) no sensorimotor deficits     (  ) weakness     (  ) loss of proprioception/vibration     (  ) loss of touch/temperature (specify:    )    SKIN:   (  x) No rashes or lesions     (  ) maculopapular rash     (  ) pustules     (  ) vesicles     (  ) ulcer     (  ) ecchymosis     (specify location:     )      (  ) Indwelling Hdez Catheter:   Date insterted:    Reason (  ) Critical illness     (  ) urinary retention    (  ) Accurate Ins/Outs Monitoring     (  ) CMO patient    (  ) Central Line:   Date inserted:  Location: (  ) Right IJ     (  ) Left IJ     (  ) Right Fem     (  ) Left Fem    (  ) SPC        (  ) pigtail       (  ) PEG tube       (  ) colostomy       (  ) jejunostomy  (  ) U-Dall    LABS:                        13.0   5.25  )-----------( 202      ( 20 Oct 2023 04:39 )             41.4     10-20    139  |  97<L>  |  30<H>  ----------------------------<  93  4.3   |  30  |  1.1    Ca    9.5      20 Oct 2023 04:39  Mg     2.3     10-20    TPro  7.1  /  Alb  3.8  /  TBili  0.7  /  DBili  x   /  AST  23  /  ALT  13  /  AlkPhos  108  10-20      Urinalysis Basic - ( 20 Oct 2023 04:39 )    Color: x / Appearance: x / SG: x / pH: x  Gluc: 93 mg/dL / Ketone: x  / Bili: x / Urobili: x   Blood: x / Protein: x / Nitrite: x   Leuk Esterase: x / RBC: x / WBC x   Sq Epi: x / Non Sq Epi: x / Bacteria: x                RADIOLOGY:          
CHIEF COMPLAINT:  Patient is a 82y old  Female who presents with a chief complaint of Volume overload (20 Oct 2023 15:05)      INTERVAL HISTORY/OVERNIGHT EVENTS:  No overnight events, ambulating without issues,    ======================  MEDICATIONS:  aspirin enteric coated 81 milliGRAM(s) Oral daily  atorvastatin 40 milliGRAM(s) Oral at bedtime  chlorhexidine 2% Cloths 1 Application(s) Topical daily  clopidogrel Tablet 75 milliGRAM(s) Oral daily  dapagliflozin 10 milliGRAM(s) Oral every 24 hours  furosemide    Tablet 40 milliGRAM(s) Oral daily  heparin   Injectable 5000 Unit(s) SubCutaneous every 8 hours  levothyroxine 112 MICROGram(s) Oral daily  metoprolol succinate  milliGRAM(s) Oral daily  spironolactone 25 milliGRAM(s) Oral daily    DRIPS:    PRN:       ======================  PHYSICAL EXAMINATION:  GEN:  nad.   HEENT:  eomi. ncat  PULM:  b/l lung sounds   CARD: s1, s2  ABD: +bs. ntnd  EXT:  no new rashes.    NEURO:  no new focal deficits.   ======================  OBJECTIVE:        VS:  T(F): 97.7 (10-21 @ 08:01), Max: 98.2 (10-20 @ 16:00)  HR: 81 (10-21 @ 08:01) (78 - 85)  BP: 116/83 (10-21 @ 08:01) (114/82 - 121/68)  RR: 18 (10-21 @ 08:01) (18 - 19)  SpO2: 93% (10-21 @ 08:01) (93% - 96%)  CVP(mm Hg): --  CO: --  CI: --  PA: --  PCWP: --    I/O:      10-18 @ 07:01  -  10-19 @ 07:00  --------------------------------------------------------  IN: 783 mL / OUT: 3650 mL / NET: -2867 mL    10-19 @ 07:01  -  10-20 @ 07:00  --------------------------------------------------------  IN: 1278 mL / OUT: 3300 mL / NET: -2022 mL    10-20 @ 07:01  -  10-21 @ 07:00  --------------------------------------------------------  IN: 921 mL / OUT: 1000 mL / NET: -79 mL    10-21 @ 07:01  -  10-21 @ 11:58  --------------------------------------------------------  IN: 300 mL / OUT: 400 mL / NET: -100 mL        Weight trend:  Weight (kg): 94.2 (10-18)    ======================    LABS:                          13.1   4.91  )-----------( 221      ( 21 Oct 2023 05:53 )             42.0     10-21    137  |  95<L>  |  29<H>  ----------------------------<  94  4.4   |  32  |  0.9    Ca    9.4      21 Oct 2023 05:53  Mg     2.5     10-21    TPro  7.0  /  Alb  4.0  /  TBili  0.6  /  DBili  x   /  AST  24  /  ALT  15  /  AlkPhos  106  10-21    LIVER FUNCTIONS - ( 21 Oct 2023 05:53 )  Alb: 4.0 g/dL / Pro: 7.0 g/dL / ALK PHOS: 106 U/L / ALT: 15 U/L / AST: 24 U/L / GGT: x                     Urinalysis Basic - ( 21 Oct 2023 05:53 )    Color: x / Appearance: x / SG: x / pH: x  Gluc: 94 mg/dL / Ketone: x  / Bili: x / Urobili: x   Blood: x / Protein: x / Nitrite: x   Leuk Esterase: x / RBC: x / WBC x   Sq Epi: x / Non Sq Epi: x / Bacteria: x

## 2023-10-23 DIAGNOSIS — K57.90 DIVERTICULOSIS OF INTESTINE, PART UNSPECIFIED, WITHOUT PERFORATION OR ABSCESS WITHOUT BLEEDING: ICD-10-CM

## 2023-10-23 DIAGNOSIS — I11.0 HYPERTENSIVE HEART DISEASE WITH HEART FAILURE: ICD-10-CM

## 2023-10-23 DIAGNOSIS — I35.0 NONRHEUMATIC AORTIC (VALVE) STENOSIS: ICD-10-CM

## 2023-10-23 DIAGNOSIS — I50.32 CHRONIC DIASTOLIC (CONGESTIVE) HEART FAILURE: ICD-10-CM

## 2023-10-23 DIAGNOSIS — Z88.0 ALLERGY STATUS TO PENICILLIN: ICD-10-CM

## 2023-10-23 DIAGNOSIS — E03.9 HYPOTHYROIDISM, UNSPECIFIED: ICD-10-CM

## 2023-10-23 DIAGNOSIS — Z87.891 PERSONAL HISTORY OF NICOTINE DEPENDENCE: ICD-10-CM

## 2023-10-23 DIAGNOSIS — E78.5 HYPERLIPIDEMIA, UNSPECIFIED: ICD-10-CM

## 2023-10-23 DIAGNOSIS — I27.20 PULMONARY HYPERTENSION, UNSPECIFIED: ICD-10-CM

## 2023-10-23 DIAGNOSIS — I25.10 ATHEROSCLEROTIC HEART DISEASE OF NATIVE CORONARY ARTERY WITHOUT ANGINA PECTORIS: ICD-10-CM

## 2023-10-26 ENCOUNTER — APPOINTMENT (OUTPATIENT)
Dept: CARDIOLOGY | Facility: CLINIC | Age: 83
End: 2023-10-26

## 2023-10-26 ENCOUNTER — APPOINTMENT (OUTPATIENT)
Dept: CARDIOTHORACIC SURGERY | Facility: CLINIC | Age: 83
End: 2023-10-26

## 2023-10-26 ENCOUNTER — APPOINTMENT (OUTPATIENT)
Dept: CARDIOTHORACIC SURGERY | Facility: CLINIC | Age: 83
End: 2023-10-26
Payer: MEDICARE

## 2023-10-26 VITALS
BODY MASS INDEX: 34.38 KG/M2 | RESPIRATION RATE: 13 BRPM | HEIGHT: 63 IN | DIASTOLIC BLOOD PRESSURE: 78 MMHG | OXYGEN SATURATION: 95 % | HEART RATE: 88 BPM | SYSTOLIC BLOOD PRESSURE: 115 MMHG | WEIGHT: 194 LBS | TEMPERATURE: 98.3 F

## 2023-10-26 LAB
ALBUMIN SERPL ELPH-MCNC: 4.3 G/DL
ALP BLD-CCNC: 108 U/L
ALT SERPL-CCNC: 36 U/L
ANION GAP SERPL CALC-SCNC: 14 MMOL/L
AST SERPL-CCNC: 40 U/L
BILIRUB SERPL-MCNC: 0.5 MG/DL
BUN SERPL-MCNC: 25 MG/DL
CALCIUM SERPL-MCNC: 9.8 MG/DL
CHLORIDE SERPL-SCNC: 102 MMOL/L
CO2 SERPL-SCNC: 28 MMOL/L
CREAT SERPL-MCNC: 1.1 MG/DL
EGFR: 50 ML/MIN/1.73M2
GLUCOSE SERPL-MCNC: 103 MG/DL
HCT VFR BLD CALC: 42.1 %
HGB BLD-MCNC: 12.9 G/DL
MCHC RBC-ENTMCNC: 29.8 PG
MCHC RBC-ENTMCNC: 30.6 G/DL
MCV RBC AUTO: 97.2 FL
NT-PROBNP SERPL-MCNC: 1434 PG/ML
PLATELET # BLD AUTO: 233 K/UL
PMV BLD: 12.1 FL
POTASSIUM SERPL-SCNC: 5.2 MMOL/L
PROT SERPL-MCNC: 7.3 G/DL
RBC # BLD: 4.33 M/UL
RBC # FLD: 16.4 %
SODIUM SERPL-SCNC: 144 MMOL/L
TSH SERPL-ACNC: 1.11 UIU/ML
WBC # FLD AUTO: 5.22 K/UL

## 2023-10-26 PROCEDURE — 99212 OFFICE O/P EST SF 10 MIN: CPT

## 2023-10-27 DIAGNOSIS — Z87.891 PERSONAL HISTORY OF NICOTINE DEPENDENCE: ICD-10-CM

## 2023-10-27 DIAGNOSIS — K29.70 GASTRITIS, UNSPECIFIED, WITHOUT BLEEDING: ICD-10-CM

## 2023-10-27 DIAGNOSIS — E78.5 HYPERLIPIDEMIA, UNSPECIFIED: ICD-10-CM

## 2023-10-27 DIAGNOSIS — Z95.818 PRESENCE OF OTHER CARDIAC IMPLANTS AND GRAFTS: ICD-10-CM

## 2023-10-27 DIAGNOSIS — E03.9 HYPOTHYROIDISM, UNSPECIFIED: ICD-10-CM

## 2023-10-27 DIAGNOSIS — I11.0 HYPERTENSIVE HEART DISEASE WITH HEART FAILURE: ICD-10-CM

## 2023-10-27 DIAGNOSIS — Z88.0 ALLERGY STATUS TO PENICILLIN: ICD-10-CM

## 2023-10-27 DIAGNOSIS — R06.02 SHORTNESS OF BREATH: ICD-10-CM

## 2023-10-27 DIAGNOSIS — I48.0 PAROXYSMAL ATRIAL FIBRILLATION: ICD-10-CM

## 2023-10-27 DIAGNOSIS — I25.10 ATHEROSCLEROTIC HEART DISEASE OF NATIVE CORONARY ARTERY WITHOUT ANGINA PECTORIS: ICD-10-CM

## 2023-10-27 DIAGNOSIS — I50.23 ACUTE ON CHRONIC SYSTOLIC (CONGESTIVE) HEART FAILURE: ICD-10-CM

## 2023-10-27 DIAGNOSIS — Z79.02 LONG TERM (CURRENT) USE OF ANTITHROMBOTICS/ANTIPLATELETS: ICD-10-CM

## 2023-10-27 DIAGNOSIS — Z79.82 LONG TERM (CURRENT) USE OF ASPIRIN: ICD-10-CM

## 2023-10-27 DIAGNOSIS — I35.0 NONRHEUMATIC AORTIC (VALVE) STENOSIS: ICD-10-CM

## 2023-10-27 DIAGNOSIS — K57.90 DIVERTICULOSIS OF INTESTINE, PART UNSPECIFIED, WITHOUT PERFORATION OR ABSCESS WITHOUT BLEEDING: ICD-10-CM

## 2023-10-27 DIAGNOSIS — Z95.5 PRESENCE OF CORONARY ANGIOPLASTY IMPLANT AND GRAFT: ICD-10-CM

## 2023-10-27 DIAGNOSIS — Z90.49 ACQUIRED ABSENCE OF OTHER SPECIFIED PARTS OF DIGESTIVE TRACT: ICD-10-CM

## 2023-11-06 ENCOUNTER — RESULT REVIEW (OUTPATIENT)
Age: 83
End: 2023-11-06

## 2023-11-06 ENCOUNTER — OUTPATIENT (OUTPATIENT)
Dept: OUTPATIENT SERVICES | Facility: HOSPITAL | Age: 83
LOS: 1 days | End: 2023-11-06
Payer: MEDICARE

## 2023-11-06 VITALS
HEIGHT: 63 IN | HEART RATE: 84 BPM | OXYGEN SATURATION: 97 % | SYSTOLIC BLOOD PRESSURE: 119 MMHG | TEMPERATURE: 97 F | DIASTOLIC BLOOD PRESSURE: 77 MMHG | WEIGHT: 194.01 LBS | RESPIRATION RATE: 47 BRPM

## 2023-11-06 DIAGNOSIS — Z95.818 PRESENCE OF OTHER CARDIAC IMPLANTS AND GRAFTS: Chronic | ICD-10-CM

## 2023-11-06 DIAGNOSIS — Z95.5 PRESENCE OF CORONARY ANGIOPLASTY IMPLANT AND GRAFT: Chronic | ICD-10-CM

## 2023-11-06 DIAGNOSIS — Z01.818 ENCOUNTER FOR OTHER PREPROCEDURAL EXAMINATION: ICD-10-CM

## 2023-11-06 DIAGNOSIS — I35.0 NONRHEUMATIC AORTIC (VALVE) STENOSIS: ICD-10-CM

## 2023-11-06 DIAGNOSIS — Z90.49 ACQUIRED ABSENCE OF OTHER SPECIFIED PARTS OF DIGESTIVE TRACT: Chronic | ICD-10-CM

## 2023-11-06 LAB
A1C WITH ESTIMATED AVERAGE GLUCOSE RESULT: 5.9 % — HIGH (ref 4–5.6)
A1C WITH ESTIMATED AVERAGE GLUCOSE RESULT: 5.9 % — HIGH (ref 4–5.6)
ALBUMIN SERPL ELPH-MCNC: 4.4 G/DL — SIGNIFICANT CHANGE UP (ref 3.5–5.2)
ALBUMIN SERPL ELPH-MCNC: 4.4 G/DL — SIGNIFICANT CHANGE UP (ref 3.5–5.2)
ALP SERPL-CCNC: 106 U/L — SIGNIFICANT CHANGE UP (ref 30–115)
ALP SERPL-CCNC: 106 U/L — SIGNIFICANT CHANGE UP (ref 30–115)
ALT FLD-CCNC: 19 U/L — SIGNIFICANT CHANGE UP (ref 0–41)
ALT FLD-CCNC: 19 U/L — SIGNIFICANT CHANGE UP (ref 0–41)
ANION GAP SERPL CALC-SCNC: 13 MMOL/L — SIGNIFICANT CHANGE UP (ref 7–14)
ANION GAP SERPL CALC-SCNC: 13 MMOL/L — SIGNIFICANT CHANGE UP (ref 7–14)
APPEARANCE UR: CLEAR — SIGNIFICANT CHANGE UP
APPEARANCE UR: CLEAR — SIGNIFICANT CHANGE UP
APTT BLD: 55.5 SEC — HIGH (ref 27–39.2)
APTT BLD: 55.5 SEC — HIGH (ref 27–39.2)
AST SERPL-CCNC: 27 U/L — SIGNIFICANT CHANGE UP (ref 0–41)
AST SERPL-CCNC: 27 U/L — SIGNIFICANT CHANGE UP (ref 0–41)
BASOPHILS # BLD AUTO: 0.02 K/UL — SIGNIFICANT CHANGE UP (ref 0–0.2)
BASOPHILS # BLD AUTO: 0.02 K/UL — SIGNIFICANT CHANGE UP (ref 0–0.2)
BASOPHILS NFR BLD AUTO: 0.3 % — SIGNIFICANT CHANGE UP (ref 0–1)
BASOPHILS NFR BLD AUTO: 0.3 % — SIGNIFICANT CHANGE UP (ref 0–1)
BILIRUB SERPL-MCNC: 0.4 MG/DL — SIGNIFICANT CHANGE UP (ref 0.2–1.2)
BILIRUB SERPL-MCNC: 0.4 MG/DL — SIGNIFICANT CHANGE UP (ref 0.2–1.2)
BILIRUB UR-MCNC: NEGATIVE — SIGNIFICANT CHANGE UP
BILIRUB UR-MCNC: NEGATIVE — SIGNIFICANT CHANGE UP
BLD GP AB SCN SERPL QL: SIGNIFICANT CHANGE UP
BLD GP AB SCN SERPL QL: SIGNIFICANT CHANGE UP
BUN SERPL-MCNC: 27 MG/DL — HIGH (ref 10–20)
BUN SERPL-MCNC: 27 MG/DL — HIGH (ref 10–20)
CALCIUM SERPL-MCNC: 9.6 MG/DL — SIGNIFICANT CHANGE UP (ref 8.4–10.5)
CALCIUM SERPL-MCNC: 9.6 MG/DL — SIGNIFICANT CHANGE UP (ref 8.4–10.5)
CHLORIDE SERPL-SCNC: 101 MMOL/L — SIGNIFICANT CHANGE UP (ref 98–110)
CHLORIDE SERPL-SCNC: 101 MMOL/L — SIGNIFICANT CHANGE UP (ref 98–110)
CO2 SERPL-SCNC: 26 MMOL/L — SIGNIFICANT CHANGE UP (ref 17–32)
CO2 SERPL-SCNC: 26 MMOL/L — SIGNIFICANT CHANGE UP (ref 17–32)
COLOR SPEC: YELLOW — SIGNIFICANT CHANGE UP
COLOR SPEC: YELLOW — SIGNIFICANT CHANGE UP
CREAT SERPL-MCNC: 1 MG/DL — SIGNIFICANT CHANGE UP (ref 0.7–1.5)
CREAT SERPL-MCNC: 1 MG/DL — SIGNIFICANT CHANGE UP (ref 0.7–1.5)
DIFF PNL FLD: NEGATIVE — SIGNIFICANT CHANGE UP
DIFF PNL FLD: NEGATIVE — SIGNIFICANT CHANGE UP
EGFR: 56 ML/MIN/1.73M2 — LOW
EGFR: 56 ML/MIN/1.73M2 — LOW
EOSINOPHIL # BLD AUTO: 0.1 K/UL — SIGNIFICANT CHANGE UP (ref 0–0.7)
EOSINOPHIL # BLD AUTO: 0.1 K/UL — SIGNIFICANT CHANGE UP (ref 0–0.7)
EOSINOPHIL NFR BLD AUTO: 1.7 % — SIGNIFICANT CHANGE UP (ref 0–8)
EOSINOPHIL NFR BLD AUTO: 1.7 % — SIGNIFICANT CHANGE UP (ref 0–8)
ESTIMATED AVERAGE GLUCOSE: 123 MG/DL — HIGH (ref 68–114)
ESTIMATED AVERAGE GLUCOSE: 123 MG/DL — HIGH (ref 68–114)
GLUCOSE SERPL-MCNC: 72 MG/DL — SIGNIFICANT CHANGE UP (ref 70–99)
GLUCOSE SERPL-MCNC: 72 MG/DL — SIGNIFICANT CHANGE UP (ref 70–99)
GLUCOSE UR QL: >=1000 MG/DL
GLUCOSE UR QL: >=1000 MG/DL
HCT VFR BLD CALC: 39.3 % — SIGNIFICANT CHANGE UP (ref 37–47)
HCT VFR BLD CALC: 39.3 % — SIGNIFICANT CHANGE UP (ref 37–47)
HGB BLD-MCNC: 12.1 G/DL — SIGNIFICANT CHANGE UP (ref 12–16)
HGB BLD-MCNC: 12.1 G/DL — SIGNIFICANT CHANGE UP (ref 12–16)
IMM GRANULOCYTES NFR BLD AUTO: 0.3 % — SIGNIFICANT CHANGE UP (ref 0.1–0.3)
IMM GRANULOCYTES NFR BLD AUTO: 0.3 % — SIGNIFICANT CHANGE UP (ref 0.1–0.3)
INR BLD: 1.1 RATIO — SIGNIFICANT CHANGE UP (ref 0.65–1.3)
INR BLD: 1.1 RATIO — SIGNIFICANT CHANGE UP (ref 0.65–1.3)
KETONES UR-MCNC: NEGATIVE MG/DL — SIGNIFICANT CHANGE UP
KETONES UR-MCNC: NEGATIVE MG/DL — SIGNIFICANT CHANGE UP
LEUKOCYTE ESTERASE UR-ACNC: NEGATIVE — SIGNIFICANT CHANGE UP
LEUKOCYTE ESTERASE UR-ACNC: NEGATIVE — SIGNIFICANT CHANGE UP
LYMPHOCYTES # BLD AUTO: 1.31 K/UL — SIGNIFICANT CHANGE UP (ref 1.2–3.4)
LYMPHOCYTES # BLD AUTO: 1.31 K/UL — SIGNIFICANT CHANGE UP (ref 1.2–3.4)
LYMPHOCYTES # BLD AUTO: 22.2 % — SIGNIFICANT CHANGE UP (ref 20.5–51.1)
LYMPHOCYTES # BLD AUTO: 22.2 % — SIGNIFICANT CHANGE UP (ref 20.5–51.1)
MCHC RBC-ENTMCNC: 29.2 PG — SIGNIFICANT CHANGE UP (ref 27–31)
MCHC RBC-ENTMCNC: 29.2 PG — SIGNIFICANT CHANGE UP (ref 27–31)
MCHC RBC-ENTMCNC: 30.8 G/DL — LOW (ref 32–37)
MCHC RBC-ENTMCNC: 30.8 G/DL — LOW (ref 32–37)
MCV RBC AUTO: 94.7 FL — SIGNIFICANT CHANGE UP (ref 81–99)
MCV RBC AUTO: 94.7 FL — SIGNIFICANT CHANGE UP (ref 81–99)
MONOCYTES # BLD AUTO: 0.59 K/UL — SIGNIFICANT CHANGE UP (ref 0.1–0.6)
MONOCYTES # BLD AUTO: 0.59 K/UL — SIGNIFICANT CHANGE UP (ref 0.1–0.6)
MONOCYTES NFR BLD AUTO: 10 % — HIGH (ref 1.7–9.3)
MONOCYTES NFR BLD AUTO: 10 % — HIGH (ref 1.7–9.3)
MRSA PCR RESULT.: NEGATIVE — SIGNIFICANT CHANGE UP
MRSA PCR RESULT.: NEGATIVE — SIGNIFICANT CHANGE UP
NEUTROPHILS # BLD AUTO: 3.85 K/UL — SIGNIFICANT CHANGE UP (ref 1.4–6.5)
NEUTROPHILS # BLD AUTO: 3.85 K/UL — SIGNIFICANT CHANGE UP (ref 1.4–6.5)
NEUTROPHILS NFR BLD AUTO: 65.5 % — SIGNIFICANT CHANGE UP (ref 42.2–75.2)
NEUTROPHILS NFR BLD AUTO: 65.5 % — SIGNIFICANT CHANGE UP (ref 42.2–75.2)
NITRITE UR-MCNC: NEGATIVE — SIGNIFICANT CHANGE UP
NITRITE UR-MCNC: NEGATIVE — SIGNIFICANT CHANGE UP
NRBC # BLD: 0 /100 WBCS — SIGNIFICANT CHANGE UP (ref 0–0)
NRBC # BLD: 0 /100 WBCS — SIGNIFICANT CHANGE UP (ref 0–0)
NT-PROBNP SERPL-SCNC: 1454 PG/ML — HIGH (ref 0–300)
NT-PROBNP SERPL-SCNC: 1454 PG/ML — HIGH (ref 0–300)
PH UR: 5.5 — SIGNIFICANT CHANGE UP (ref 5–8)
PH UR: 5.5 — SIGNIFICANT CHANGE UP (ref 5–8)
PLATELET # BLD AUTO: 272 K/UL — SIGNIFICANT CHANGE UP (ref 130–400)
PLATELET # BLD AUTO: 272 K/UL — SIGNIFICANT CHANGE UP (ref 130–400)
PMV BLD: 11.6 FL — HIGH (ref 7.4–10.4)
PMV BLD: 11.6 FL — HIGH (ref 7.4–10.4)
POTASSIUM SERPL-MCNC: 4.9 MMOL/L — SIGNIFICANT CHANGE UP (ref 3.5–5)
POTASSIUM SERPL-MCNC: 4.9 MMOL/L — SIGNIFICANT CHANGE UP (ref 3.5–5)
POTASSIUM SERPL-SCNC: 4.9 MMOL/L — SIGNIFICANT CHANGE UP (ref 3.5–5)
POTASSIUM SERPL-SCNC: 4.9 MMOL/L — SIGNIFICANT CHANGE UP (ref 3.5–5)
PROT SERPL-MCNC: 7.7 G/DL — SIGNIFICANT CHANGE UP (ref 6–8)
PROT SERPL-MCNC: 7.7 G/DL — SIGNIFICANT CHANGE UP (ref 6–8)
PROT UR-MCNC: SIGNIFICANT CHANGE UP MG/DL
PROT UR-MCNC: SIGNIFICANT CHANGE UP MG/DL
PROTHROM AB SERPL-ACNC: 12.5 SEC — SIGNIFICANT CHANGE UP (ref 9.95–12.87)
PROTHROM AB SERPL-ACNC: 12.5 SEC — SIGNIFICANT CHANGE UP (ref 9.95–12.87)
RBC # BLD: 4.15 M/UL — LOW (ref 4.2–5.4)
RBC # BLD: 4.15 M/UL — LOW (ref 4.2–5.4)
RBC # FLD: 16.1 % — HIGH (ref 11.5–14.5)
RBC # FLD: 16.1 % — HIGH (ref 11.5–14.5)
SODIUM SERPL-SCNC: 140 MMOL/L — SIGNIFICANT CHANGE UP (ref 135–146)
SODIUM SERPL-SCNC: 140 MMOL/L — SIGNIFICANT CHANGE UP (ref 135–146)
SP GR SPEC: >1.03 — HIGH (ref 1–1.03)
SP GR SPEC: >1.03 — HIGH (ref 1–1.03)
UROBILINOGEN FLD QL: 1 MG/DL — SIGNIFICANT CHANGE UP (ref 0.2–1)
UROBILINOGEN FLD QL: 1 MG/DL — SIGNIFICANT CHANGE UP (ref 0.2–1)
WBC # BLD: 5.89 K/UL — SIGNIFICANT CHANGE UP (ref 4.8–10.8)
WBC # BLD: 5.89 K/UL — SIGNIFICANT CHANGE UP (ref 4.8–10.8)
WBC # FLD AUTO: 5.89 K/UL — SIGNIFICANT CHANGE UP (ref 4.8–10.8)
WBC # FLD AUTO: 5.89 K/UL — SIGNIFICANT CHANGE UP (ref 4.8–10.8)

## 2023-11-06 PROCEDURE — 87641 MR-STAPH DNA AMP PROBE: CPT

## 2023-11-06 PROCEDURE — 87640 STAPH A DNA AMP PROBE: CPT

## 2023-11-06 PROCEDURE — 86900 BLOOD TYPING SEROLOGIC ABO: CPT

## 2023-11-06 PROCEDURE — 85730 THROMBOPLASTIN TIME PARTIAL: CPT

## 2023-11-06 PROCEDURE — 36415 COLL VENOUS BLD VENIPUNCTURE: CPT

## 2023-11-06 PROCEDURE — 85610 PROTHROMBIN TIME: CPT

## 2023-11-06 PROCEDURE — 80053 COMPREHEN METABOLIC PANEL: CPT

## 2023-11-06 PROCEDURE — 99214 OFFICE O/P EST MOD 30 MIN: CPT | Mod: 25

## 2023-11-06 PROCEDURE — 85025 COMPLETE CBC W/AUTO DIFF WBC: CPT

## 2023-11-06 PROCEDURE — 93005 ELECTROCARDIOGRAM TRACING: CPT

## 2023-11-06 PROCEDURE — 81003 URINALYSIS AUTO W/O SCOPE: CPT

## 2023-11-06 PROCEDURE — 71046 X-RAY EXAM CHEST 2 VIEWS: CPT | Mod: 26

## 2023-11-06 PROCEDURE — 71046 X-RAY EXAM CHEST 2 VIEWS: CPT

## 2023-11-06 PROCEDURE — 83880 ASSAY OF NATRIURETIC PEPTIDE: CPT

## 2023-11-06 PROCEDURE — 83036 HEMOGLOBIN GLYCOSYLATED A1C: CPT

## 2023-11-06 PROCEDURE — 86850 RBC ANTIBODY SCREEN: CPT

## 2023-11-06 PROCEDURE — 86901 BLOOD TYPING SEROLOGIC RH(D): CPT

## 2023-11-06 PROCEDURE — 93010 ELECTROCARDIOGRAM REPORT: CPT

## 2023-11-06 NOTE — H&P PST ADULT - NSICDXPASTMEDICALHX_GEN_ALL_CORE_FT
PAST MEDICAL HISTORY:  Aortic stenosis     Atrial fibrillation     CAD (coronary artery disease)     Former smoker     H/O: GI bleed     H/O: obesity     HTN (hypertension)     Hypercholesteremia     Hypothyroid

## 2023-11-06 NOTE — H&P PST ADULT - NSICDXPASTSURGICALHX_GEN_ALL_CORE_FT
PAST SURGICAL HISTORY:  Presence of Amulet left atrial appendage closure device     Presence of Watchman left atrial appendage closure device     S/P appendectomy     Stented coronary artery

## 2023-11-06 NOTE — H&P PST ADULT - PSYCHIATRIC
[Difficulty Breathing During Exertion] : denies dyspnea on exertion [Feelings Of Weakness On Exertion] : denies exercise intolerance [Cough] : denies coughing [Wheezing] : denies wheezing [Regional Soft Tissue Swelling Both Lower Extremities] : denies lower extremity edema [Chest Pain Or Discomfort] : denies chest pain [Wt Gain ___ Lbs] : no recent weight gain [Wt Loss ___ Lbs] : no recent weight loss [0  -  Nothing at all] : 0, nothing at all [Class I - No Symptoms and No Limitations] : I [Former] : is a former smoker [___ Year Quit] : ~He/She~ quit smoking in [unfilled] [___ Pack Year History] : [unfilled] pack year history [None] : The patient is not currently on any medications [Goals--Doing Well] : the patient is doing well with ~his/her~ goals [de-identified] : 1/2 PPD x 17rs [FreeTextEntry1] : The patient is a 63-year-old former 1/2 PPD smoker who quit in 1990 with a past medical history significant for alpha-1 antitrypsin deficiency. His father tested positive and he had all 3 of his children tested. The patient's youngest sibling, his only sister is on Prolastin and has been encouraging him to be evaluated. Patient has been able to exercise without difficulty he may have had one set of PFTs about 15 years ago but he is able to exercise without difficulty he spends 3 times a week without shortness of breath and does not notice increasing shortness of breath with changes in weather or season. Denies chest pain cough hemoptysis leg swelling joint swelling fevers chills night sweats or anorexia normal/normal affect/alert and oriented x3/normal behavior

## 2023-11-06 NOTE — H&P PST ADULT - HISTORY OF PRESENT ILLNESS
PT PRESENTS TO PAST WITH NO SOB, CP, PALPITATIONS, DYSURIA, UTI OR URI AT PRESENT.  AS PER THE PT, THIS IS HIS/HER COMPLETE MEDICAL AND SURGICAL HX, INCLUDING MEDICATIONS PRESCRIBED AND OVER THE COUNTER    pt denies any covid s/s, or tested positive in the past  pt advised self quarantine till day of procedure  denies travel outside the [place] in the past 30 days    Anesthesia Alert  +  Difficult Airway (CLASS IV )  NO--History of neck surgery or radiation  NO--Limited ROM of neck  NO--History of Malignant hyperthermia  NO--Personal or family history of Pseudocholinesterase deficiency  NO--Prior Anesthesia Complication  NO--Latex Allergy  NO--Loose teeth  NO--History of Rheumatoid Arthritis  YES  --MARIETTA- PT UNCERTAIN  ADVISED FOLLOW UP   + BLEEDING RISK (ON ASA & PLAVIX)        D[facility] Activity Status Index (DASI)   RESULT SUMMARY:  13.45 points  The higher the score (maximum 58.2), the higher the functional status.  4.40 METs  INPUTS:  Take care of self —> 2.75 = Yes  Walk indoors —> 1.75 = Yes  Walk 1&ndash;2 blocks on level ground —> 2.75 = Yes  Climb a flight of stairs or walk up a hill —> 0 = No  Run a short distance —> 0 = No  Do light work around the house —> 2.7 = Yes  Do moderate work around the house —> 3.5 = Yes  Do heavy work around the house —> 0 = No  Do yardwork —> 0 = No  Have sexual relations —> 0 = No  Participate in moderate recreational activities —> 0 = No  Participate in strenuous sports —> 0 = No    Revised Cardiac Risk Index for Pre-Operative Risk   RESULT SUMMARY:  0 points  Class I Risk  3.9 %  30-day risk of death, MI, or cardiac arrest  INPUTS:  Elevated-risk surgery —> 0 = No  History of ischemic heart disease —> 0 = No  History of congestive heart failure —> 0 = No  History of cerebrovascular disease —> 0 = No  Pre-operative treatment with insulin —> 0 = No  Pre-operative creatinine >2 mg/dL / 176.8 µmol/L —> 0 = No

## 2023-11-06 NOTE — H&P PST ADULT - REASON FOR ADMISSION
81 y/o woman w PMHx of former smoker, AF and off anticoagulation 2/2 GIB, s/p Watchman, HTN, HLD, diverticulosis, non-erosive gastritis, AS s/p recent BAV, CAD s/p recent PCI to dLM/pLAD on 10/12/23, hypothyroidism presented for STRONG/fatigue 4 days after discharge. Patient w recent admission for TAVR but cath showed significant dLM/pLAD disease and patient underwent PCI with BAV and was discharged.  NOW FOR SCHEDULED TAVR.    PATIENT HAS BEEN C/O DARK/BLACK STOOLS X1 WEEK. MICHELLE NP MADE AWARE.  ?

## 2023-11-07 DIAGNOSIS — Z01.818 ENCOUNTER FOR OTHER PREPROCEDURAL EXAMINATION: ICD-10-CM

## 2023-11-07 DIAGNOSIS — I35.0 NONRHEUMATIC AORTIC (VALVE) STENOSIS: ICD-10-CM

## 2023-11-08 NOTE — H&P ADULT - NSICDXNOFAMILYHX_GEN_ALL_CORE
I have reviewed patient's recent ER visit, EKG, lab work that showed no evidence of acute coronary syndrome.  I have reviewed most recent coronary angiogram.  Recommend exercise nuclear stress test if patient has recurrent chest.  Continue current cardiac medication.    Fermin Bartlett MD    
<-- Click to add NO pertinent Family History

## 2023-11-14 VITALS — HEIGHT: 62.99 IN | WEIGHT: 211.64 LBS

## 2023-11-14 NOTE — PRE-OP CHECKLIST - SELECT TESTS ORDERED
CBC/CMP/PT/PTT/INR/Hepatic Function/Spirometry/Type and Cross/Type and Screen/Urinalysis/EKG/CXR/Results in MD note

## 2023-11-15 ENCOUNTER — TRANSCRIPTION ENCOUNTER (OUTPATIENT)
Age: 83
End: 2023-11-15

## 2023-11-15 ENCOUNTER — APPOINTMENT (OUTPATIENT)
Dept: CARDIOTHORACIC SURGERY | Facility: HOSPITAL | Age: 83
End: 2023-11-15

## 2023-11-15 ENCOUNTER — INPATIENT (INPATIENT)
Facility: HOSPITAL | Age: 83
LOS: 1 days | Discharge: HOME | DRG: 267 | End: 2023-11-17
Attending: THORACIC SURGERY (CARDIOTHORACIC VASCULAR SURGERY) | Admitting: THORACIC SURGERY (CARDIOTHORACIC VASCULAR SURGERY)
Payer: MEDICARE

## 2023-11-15 DIAGNOSIS — I35.0 NONRHEUMATIC AORTIC (VALVE) STENOSIS: ICD-10-CM

## 2023-11-15 DIAGNOSIS — Z95.818 PRESENCE OF OTHER CARDIAC IMPLANTS AND GRAFTS: Chronic | ICD-10-CM

## 2023-11-15 DIAGNOSIS — Z90.49 ACQUIRED ABSENCE OF OTHER SPECIFIED PARTS OF DIGESTIVE TRACT: Chronic | ICD-10-CM

## 2023-11-15 DIAGNOSIS — Z95.5 PRESENCE OF CORONARY ANGIOPLASTY IMPLANT AND GRAFT: Chronic | ICD-10-CM

## 2023-11-15 LAB
ALBUMIN SERPL ELPH-MCNC: 3.4 G/DL — LOW (ref 3.5–5.2)
ALBUMIN SERPL ELPH-MCNC: 3.4 G/DL — LOW (ref 3.5–5.2)
ALP SERPL-CCNC: 93 U/L — SIGNIFICANT CHANGE UP (ref 30–115)
ALP SERPL-CCNC: 93 U/L — SIGNIFICANT CHANGE UP (ref 30–115)
ALT FLD-CCNC: 12 U/L — SIGNIFICANT CHANGE UP (ref 0–41)
ALT FLD-CCNC: 12 U/L — SIGNIFICANT CHANGE UP (ref 0–41)
ANION GAP SERPL CALC-SCNC: 10 MMOL/L — SIGNIFICANT CHANGE UP (ref 7–14)
ANION GAP SERPL CALC-SCNC: 10 MMOL/L — SIGNIFICANT CHANGE UP (ref 7–14)
APTT BLD: 70.9 SEC — CRITICAL HIGH (ref 27–39.2)
APTT BLD: 70.9 SEC — CRITICAL HIGH (ref 27–39.2)
AST SERPL-CCNC: 21 U/L — SIGNIFICANT CHANGE UP (ref 0–41)
AST SERPL-CCNC: 21 U/L — SIGNIFICANT CHANGE UP (ref 0–41)
BASOPHILS # BLD AUTO: 0.02 K/UL — SIGNIFICANT CHANGE UP (ref 0–0.2)
BASOPHILS # BLD AUTO: 0.02 K/UL — SIGNIFICANT CHANGE UP (ref 0–0.2)
BASOPHILS NFR BLD AUTO: 0.2 % — SIGNIFICANT CHANGE UP (ref 0–1)
BASOPHILS NFR BLD AUTO: 0.2 % — SIGNIFICANT CHANGE UP (ref 0–1)
BILIRUB SERPL-MCNC: 0.6 MG/DL — SIGNIFICANT CHANGE UP (ref 0.2–1.2)
BILIRUB SERPL-MCNC: 0.6 MG/DL — SIGNIFICANT CHANGE UP (ref 0.2–1.2)
BLD GP AB SCN SERPL QL: SIGNIFICANT CHANGE UP
BLD GP AB SCN SERPL QL: SIGNIFICANT CHANGE UP
BUN SERPL-MCNC: 19 MG/DL — SIGNIFICANT CHANGE UP (ref 10–20)
BUN SERPL-MCNC: 19 MG/DL — SIGNIFICANT CHANGE UP (ref 10–20)
CALCIUM SERPL-MCNC: 8.1 MG/DL — LOW (ref 8.4–10.5)
CALCIUM SERPL-MCNC: 8.1 MG/DL — LOW (ref 8.4–10.5)
CHLORIDE SERPL-SCNC: 99 MMOL/L — SIGNIFICANT CHANGE UP (ref 98–110)
CHLORIDE SERPL-SCNC: 99 MMOL/L — SIGNIFICANT CHANGE UP (ref 98–110)
CO2 SERPL-SCNC: 25 MMOL/L — SIGNIFICANT CHANGE UP (ref 17–32)
CO2 SERPL-SCNC: 25 MMOL/L — SIGNIFICANT CHANGE UP (ref 17–32)
CREAT SERPL-MCNC: 0.9 MG/DL — SIGNIFICANT CHANGE UP (ref 0.7–1.5)
CREAT SERPL-MCNC: 0.9 MG/DL — SIGNIFICANT CHANGE UP (ref 0.7–1.5)
EGFR: 63 ML/MIN/1.73M2 — SIGNIFICANT CHANGE UP
EGFR: 63 ML/MIN/1.73M2 — SIGNIFICANT CHANGE UP
EOSINOPHIL # BLD AUTO: 0.09 K/UL — SIGNIFICANT CHANGE UP (ref 0–0.7)
EOSINOPHIL # BLD AUTO: 0.09 K/UL — SIGNIFICANT CHANGE UP (ref 0–0.7)
EOSINOPHIL NFR BLD AUTO: 0.7 % — SIGNIFICANT CHANGE UP (ref 0–8)
EOSINOPHIL NFR BLD AUTO: 0.7 % — SIGNIFICANT CHANGE UP (ref 0–8)
GLUCOSE SERPL-MCNC: 112 MG/DL — HIGH (ref 70–99)
GLUCOSE SERPL-MCNC: 112 MG/DL — HIGH (ref 70–99)
HCT VFR BLD CALC: 30.5 % — LOW (ref 37–47)
HCT VFR BLD CALC: 30.5 % — LOW (ref 37–47)
HGB BLD-MCNC: 9.6 G/DL — LOW (ref 12–16)
HGB BLD-MCNC: 9.6 G/DL — LOW (ref 12–16)
IMM GRANULOCYTES NFR BLD AUTO: 1 % — HIGH (ref 0.1–0.3)
IMM GRANULOCYTES NFR BLD AUTO: 1 % — HIGH (ref 0.1–0.3)
INR BLD: 1.18 RATIO — SIGNIFICANT CHANGE UP (ref 0.65–1.3)
INR BLD: 1.18 RATIO — SIGNIFICANT CHANGE UP (ref 0.65–1.3)
LYMPHOCYTES # BLD AUTO: 1.12 K/UL — LOW (ref 1.2–3.4)
LYMPHOCYTES # BLD AUTO: 1.12 K/UL — LOW (ref 1.2–3.4)
LYMPHOCYTES # BLD AUTO: 9.1 % — LOW (ref 20.5–51.1)
LYMPHOCYTES # BLD AUTO: 9.1 % — LOW (ref 20.5–51.1)
MCHC RBC-ENTMCNC: 28.2 PG — SIGNIFICANT CHANGE UP (ref 27–31)
MCHC RBC-ENTMCNC: 28.2 PG — SIGNIFICANT CHANGE UP (ref 27–31)
MCHC RBC-ENTMCNC: 31.5 G/DL — LOW (ref 32–37)
MCHC RBC-ENTMCNC: 31.5 G/DL — LOW (ref 32–37)
MCV RBC AUTO: 89.7 FL — SIGNIFICANT CHANGE UP (ref 81–99)
MCV RBC AUTO: 89.7 FL — SIGNIFICANT CHANGE UP (ref 81–99)
MONOCYTES # BLD AUTO: 0.3 K/UL — SIGNIFICANT CHANGE UP (ref 0.1–0.6)
MONOCYTES # BLD AUTO: 0.3 K/UL — SIGNIFICANT CHANGE UP (ref 0.1–0.6)
MONOCYTES NFR BLD AUTO: 2.4 % — SIGNIFICANT CHANGE UP (ref 1.7–9.3)
MONOCYTES NFR BLD AUTO: 2.4 % — SIGNIFICANT CHANGE UP (ref 1.7–9.3)
NEUTROPHILS # BLD AUTO: 10.69 K/UL — HIGH (ref 1.4–6.5)
NEUTROPHILS # BLD AUTO: 10.69 K/UL — HIGH (ref 1.4–6.5)
NEUTROPHILS NFR BLD AUTO: 86.6 % — HIGH (ref 42.2–75.2)
NEUTROPHILS NFR BLD AUTO: 86.6 % — HIGH (ref 42.2–75.2)
NRBC # BLD: 0 /100 WBCS — SIGNIFICANT CHANGE UP (ref 0–0)
NRBC # BLD: 0 /100 WBCS — SIGNIFICANT CHANGE UP (ref 0–0)
PLATELET # BLD AUTO: 242 K/UL — SIGNIFICANT CHANGE UP (ref 130–400)
PLATELET # BLD AUTO: 242 K/UL — SIGNIFICANT CHANGE UP (ref 130–400)
PMV BLD: 10.8 FL — HIGH (ref 7.4–10.4)
PMV BLD: 10.8 FL — HIGH (ref 7.4–10.4)
POTASSIUM SERPL-MCNC: 4.4 MMOL/L — SIGNIFICANT CHANGE UP (ref 3.5–5)
POTASSIUM SERPL-MCNC: 4.4 MMOL/L — SIGNIFICANT CHANGE UP (ref 3.5–5)
POTASSIUM SERPL-SCNC: 4.4 MMOL/L — SIGNIFICANT CHANGE UP (ref 3.5–5)
POTASSIUM SERPL-SCNC: 4.4 MMOL/L — SIGNIFICANT CHANGE UP (ref 3.5–5)
PROT SERPL-MCNC: 6.2 G/DL — SIGNIFICANT CHANGE UP (ref 6–8)
PROT SERPL-MCNC: 6.2 G/DL — SIGNIFICANT CHANGE UP (ref 6–8)
PROTHROM AB SERPL-ACNC: 13.5 SEC — HIGH (ref 9.95–12.87)
PROTHROM AB SERPL-ACNC: 13.5 SEC — HIGH (ref 9.95–12.87)
RBC # BLD: 3.4 M/UL — LOW (ref 4.2–5.4)
RBC # BLD: 3.4 M/UL — LOW (ref 4.2–5.4)
RBC # FLD: 15.5 % — HIGH (ref 11.5–14.5)
RBC # FLD: 15.5 % — HIGH (ref 11.5–14.5)
SODIUM SERPL-SCNC: 134 MMOL/L — LOW (ref 135–146)
SODIUM SERPL-SCNC: 134 MMOL/L — LOW (ref 135–146)
WBC # BLD: 12.34 K/UL — HIGH (ref 4.8–10.8)
WBC # BLD: 12.34 K/UL — HIGH (ref 4.8–10.8)
WBC # FLD AUTO: 12.34 K/UL — HIGH (ref 4.8–10.8)
WBC # FLD AUTO: 12.34 K/UL — HIGH (ref 4.8–10.8)

## 2023-11-15 PROCEDURE — 86902 BLOOD TYPE ANTIGEN DONOR EA: CPT

## 2023-11-15 PROCEDURE — 85610 PROTHROMBIN TIME: CPT

## 2023-11-15 PROCEDURE — 36415 COLL VENOUS BLD VENIPUNCTURE: CPT

## 2023-11-15 PROCEDURE — 85025 COMPLETE CBC W/AUTO DIFF WBC: CPT

## 2023-11-15 PROCEDURE — 33361 REPLACE AORTIC VALVE PERQ: CPT | Mod: 79,62

## 2023-11-15 PROCEDURE — 71045 X-RAY EXAM CHEST 1 VIEW: CPT | Mod: 26

## 2023-11-15 PROCEDURE — 33361 REPLACE AORTIC VALVE PERQ: CPT | Mod: Q0,62

## 2023-11-15 PROCEDURE — 93005 ELECTROCARDIOGRAM TRACING: CPT

## 2023-11-15 PROCEDURE — 80053 COMPREHEN METABOLIC PANEL: CPT

## 2023-11-15 PROCEDURE — 93306 TTE W/DOPPLER COMPLETE: CPT | Mod: 26

## 2023-11-15 PROCEDURE — 83735 ASSAY OF MAGNESIUM: CPT

## 2023-11-15 PROCEDURE — 86900 BLOOD TYPING SEROLOGIC ABO: CPT

## 2023-11-15 PROCEDURE — C1894: CPT

## 2023-11-15 PROCEDURE — C1760: CPT

## 2023-11-15 PROCEDURE — 93306 TTE W/DOPPLER COMPLETE: CPT

## 2023-11-15 PROCEDURE — 93010 ELECTROCARDIOGRAM REPORT: CPT

## 2023-11-15 PROCEDURE — 85730 THROMBOPLASTIN TIME PARTIAL: CPT

## 2023-11-15 PROCEDURE — 71045 X-RAY EXAM CHEST 1 VIEW: CPT

## 2023-11-15 PROCEDURE — 86901 BLOOD TYPING SEROLOGIC RH(D): CPT

## 2023-11-15 PROCEDURE — 86850 RBC ANTIBODY SCREEN: CPT

## 2023-11-15 PROCEDURE — 33361 REPLACE AORTIC VALVE PERQ: CPT | Mod: 62,Q0

## 2023-11-15 PROCEDURE — C1769: CPT

## 2023-11-15 PROCEDURE — 86922 COMPATIBILITY TEST ANTIGLOB: CPT

## 2023-11-15 PROCEDURE — C1889: CPT

## 2023-11-15 PROCEDURE — C1887: CPT

## 2023-11-15 RX ORDER — HYDROXYZINE HCL 10 MG
25 TABLET ORAL DAILY
Refills: 0 | Status: DISCONTINUED | OUTPATIENT
Start: 2023-11-16 | End: 2023-11-17

## 2023-11-15 RX ORDER — OXYCODONE HYDROCHLORIDE 5 MG/1
5 TABLET ORAL EVERY 6 HOURS
Refills: 0 | Status: DISCONTINUED | OUTPATIENT
Start: 2023-11-15 | End: 2023-11-17

## 2023-11-15 RX ORDER — LEVOTHYROXINE SODIUM 125 MCG
112 TABLET ORAL DAILY
Refills: 0 | Status: DISCONTINUED | OUTPATIENT
Start: 2023-11-16 | End: 2023-11-17

## 2023-11-15 RX ORDER — CHLORHEXIDINE GLUCONATE 213 G/1000ML
1 SOLUTION TOPICAL DAILY
Refills: 0 | Status: DISCONTINUED | OUTPATIENT
Start: 2023-11-16 | End: 2023-11-17

## 2023-11-15 RX ORDER — DEXMEDETOMIDINE HYDROCHLORIDE IN 0.9% SODIUM CHLORIDE 4 UG/ML
0.25 INJECTION INTRAVENOUS
Qty: 200 | Refills: 0 | Status: DISCONTINUED | OUTPATIENT
Start: 2023-11-15 | End: 2023-11-16

## 2023-11-15 RX ORDER — NICARDIPINE HYDROCHLORIDE 30 MG/1
5 CAPSULE, EXTENDED RELEASE ORAL
Qty: 40 | Refills: 0 | Status: DISCONTINUED | OUTPATIENT
Start: 2023-11-15 | End: 2023-11-16

## 2023-11-15 RX ORDER — ASPIRIN/CALCIUM CARB/MAGNESIUM 324 MG
81 TABLET ORAL DAILY
Refills: 0 | Status: DISCONTINUED | OUTPATIENT
Start: 2023-11-16 | End: 2023-11-17

## 2023-11-15 RX ORDER — ACETAMINOPHEN 500 MG
650 TABLET ORAL EVERY 6 HOURS
Refills: 0 | Status: DISCONTINUED | OUTPATIENT
Start: 2023-11-15 | End: 2023-11-17

## 2023-11-15 RX ORDER — SODIUM CHLORIDE 9 MG/ML
1000 INJECTION INTRAMUSCULAR; INTRAVENOUS; SUBCUTANEOUS
Refills: 0 | Status: DISCONTINUED | OUTPATIENT
Start: 2023-11-15 | End: 2023-11-16

## 2023-11-15 RX ORDER — CLOPIDOGREL BISULFATE 75 MG/1
75 TABLET, FILM COATED ORAL DAILY
Refills: 0 | Status: DISCONTINUED | OUTPATIENT
Start: 2023-11-15 | End: 2023-11-16

## 2023-11-15 RX ORDER — ACETAMINOPHEN 500 MG
1000 TABLET ORAL ONCE
Refills: 0 | Status: COMPLETED | OUTPATIENT
Start: 2023-11-15 | End: 2023-11-16

## 2023-11-15 RX ORDER — ASPIRIN/CALCIUM CARB/MAGNESIUM 324 MG
81 TABLET ORAL DAILY
Refills: 0 | Status: DISCONTINUED | OUTPATIENT
Start: 2023-11-15 | End: 2023-11-16

## 2023-11-15 RX ORDER — FUROSEMIDE 40 MG
40 TABLET ORAL DAILY
Refills: 0 | Status: DISCONTINUED | OUTPATIENT
Start: 2023-11-16 | End: 2023-11-17

## 2023-11-15 RX ORDER — CLOPIDOGREL BISULFATE 75 MG/1
75 TABLET, FILM COATED ORAL DAILY
Refills: 0 | Status: DISCONTINUED | OUTPATIENT
Start: 2023-11-16 | End: 2023-11-17

## 2023-11-15 RX ORDER — CHLORHEXIDINE GLUCONATE 213 G/1000ML
5 SOLUTION TOPICAL
Refills: 0 | Status: DISCONTINUED | OUTPATIENT
Start: 2023-11-15 | End: 2023-11-17

## 2023-11-15 RX ORDER — NITROGLYCERIN 6.5 MG
5 CAPSULE, EXTENDED RELEASE ORAL
Qty: 50 | Refills: 0 | Status: DISCONTINUED | OUTPATIENT
Start: 2023-11-15 | End: 2023-11-16

## 2023-11-15 RX ORDER — PANTOPRAZOLE SODIUM 20 MG/1
40 TABLET, DELAYED RELEASE ORAL DAILY
Refills: 0 | Status: DISCONTINUED | OUTPATIENT
Start: 2023-11-16 | End: 2023-11-17

## 2023-11-15 RX ORDER — VANCOMYCIN HCL 1 G
1000 VIAL (EA) INTRAVENOUS EVERY 12 HOURS
Refills: 0 | Status: COMPLETED | OUTPATIENT
Start: 2023-11-15 | End: 2023-11-16

## 2023-11-15 RX ORDER — POLYETHYLENE GLYCOL 3350 17 G/17G
17 POWDER, FOR SOLUTION ORAL DAILY
Refills: 0 | Status: DISCONTINUED | OUTPATIENT
Start: 2023-11-15 | End: 2023-11-17

## 2023-11-15 RX ORDER — ONDANSETRON 8 MG/1
4 TABLET, FILM COATED ORAL ONCE
Refills: 0 | Status: DISCONTINUED | OUTPATIENT
Start: 2023-11-15 | End: 2023-11-17

## 2023-11-15 RX ADMIN — Medication 250 MILLIGRAM(S): at 20:50

## 2023-11-15 NOTE — DISCHARGE NOTE PROVIDER - CARE PROVIDERS DIRECT ADDRESSES
,DirectAddress_Unknown,DirectAddress_Unknown,[email or web address] ,DirectAddress_Unknown,[email or web address],DirectAddress_Unknown

## 2023-11-15 NOTE — DISCHARGE NOTE PROVIDER - CARE PROVIDER_API CALL
[facility],   [address], [address]  [place] [address]  Phone: [phone]  Fax: (   )    -  Follow Up Time:     MEGGAN DEMPSEY MD  [address] [place] [address]  Phone: [phone]  Fax: ()-  Follow Up Time: 1 month    Yasir Spaulding  Interventional Cardiology  [address], [address]  [place] [address]  Phone: [phone]  Fax: [phone]  Follow Up Time: 1 week   ROSELYN SELBY, MEGGAN MARTINEZ  [address] [place] [address]  Phone: [phone]  Fax: ()-  Follow Up Time: 1 month    Yasir Spaulding  Interventional Cardiology  [address] [place] [address], [address]  [place] [address]  Phone: [phone]  Fax: [phone]  Scheduled Appointment: 11/22/2023 09:30 AM    [facility],   [address] [place] [address], [address]  [place] [address]  Phone: [phone]  Fax: (   )    -  Follow Up Time:

## 2023-11-15 NOTE — DISCHARGE NOTE PROVIDER - NSDCFUSCHEDAPPT_GEN_ALL_CORE_FT
Yasir Spaulding  [facility] Physician [facility]  CARDIOLOGY [address]  Scheduled Appointment: 11/22/2023    Jose Alan  [facility] Physician [facility]  ELECTROPH [address]  Scheduled Appointment: 01/11/2024

## 2023-11-15 NOTE — DISCHARGE NOTE PROVIDER - NSDCMRMEDTOKEN_GEN_ALL_CORE_FT
aspirin 81 mg oral delayed release tablet: 1 tab(s) orally once a day  clopidogrel 75 mg oral tablet: 1 tab(s) orally once a day  furosemide 40 mg oral tablet: 1 tab(s) orally once a day  hydrOXYzine hydrochloride 25 mg oral tablet: 1 tab(s) orally once a day  metoprolol succinate 50 mg oral capsule, extended release: 1 cap(s) orally 2 times a day  Multiple Vitamins oral capsule: 1 cap(s) orally once a day  PreserVision AREDS 2 oral capsule: orally once a day  Synthroid 112 mcg (0.112 mg) oral tablet: 1 tab(s) orally once a day   acetaminophen 325 mg oral tablet: 2 tab(s) orally every 6 hours As needed Temp greater or equal to 38C (100.4F), Mild Pain (1 - 3)  aspirin 81 mg oral delayed release tablet: 1 tab(s) orally once a day  clopidogrel 75 mg oral tablet: 1 tab(s) orally once a day  furosemide 40 mg oral tablet: 1 tab(s) orally once a day  hydrOXYzine hydrochloride 25 mg oral tablet: 1 tab(s) orally once a day  levothyroxine 112 mcg (0.112 mg) oral tablet: 1 tab(s) orally once a day  metoprolol succinate 50 mg oral tablet, extended release: 1 tab(s) orally once a day  Multiple Vitamins oral capsule: 1 cap(s) orally once a day  oxyCODONE 5 mg oral tablet: 1 tab(s) orally every 6 hours as needed for Moderate Pain (4 - 6) MDD: 4  pantoprazole 40 mg oral delayed release tablet: 1 tab(s) orally once a day  PreserVision AREDS 2 oral capsule: orally once a day

## 2023-11-15 NOTE — DISCHARGE NOTE PROVIDER - HOSPITAL COURSE
83 year old woman ex smoker with past medical history of GIB, A fib (off anticoagulation due to GIB), s/p Watchman device insertion, HTN, HLD, diverticulosis, non-erosive gastritis, aortic stenosis and hypothyroidism presented early October for elective TAVR due to progressive dyspnea and fatigue related to AS.  On 10/11/23, she went to the cath lab for TAVR. Pre-op TTE though, revealed decreased EF from previous study in June. June cardiac cath did not reveal significant CAD, Decision was made to shoot coronaries prior to inserting valve. Upon coronary injection patient was found to have critical left main coronary artery disease. TAVR canceled, following day patient underwent successful PCI of LM (Plavix / ASA) and unsuccessful BAV of AV. She was discharged with plan of elective TAVR at a later date.    Patient presents today for TAVR. Her post-op course was         A discussion was conducted with the patient regarding the importance and benefits of participating in a cardiothoracic rehabilitation program. Contact information given to the patient. Patient instructed to inquire further upon seeing their cardiologist post op.      A MCOT device was applied to patient prior to discharge. Patient inform it is used to monitor for rhythm disturbances after TAVR for 30 days. Patient instructed on use and care of device with understanding of instructions confirmed by the teach back method. 83 year old woman ex smoker with past medical history of GIB, A fib (off anticoagulation due to GIB), s/p Watchman device insertion, HTN, HLD, diverticulosis, non-erosive gastritis, aortic stenosis and hypothyroidism presented early October for elective TAVR due to progressive dyspnea and fatigue related to AS.  On 10/11/23, she went to the cath lab for TAVR. Pre-op TTE though, revealed decreased EF from previous study in June. June cardiac cath did not reveal significant CAD, Decision was made to shoot coronaries prior to inserting valve. Upon coronary injection patient was found to have critical left main coronary artery disease. TAVR canceled, following day patient underwent successful PCI of LM (Plavix / ASA) and successful BAV of AV. She was discharged with plan of elective TAVR.     Patient presents today for TAVR. Her post-op course was         A discussion was conducted with the patient regarding the importance and benefits of participating in a cardiothoracic rehabilitation program. Contact information given to the patient. Patient instructed to inquire further upon seeing their cardiologist post op.      A MCOT device was applied to patient prior to discharge. Patient inform it is used to monitor for rhythm disturbances after TAVR for 30 days. Patient instructed on use and care of device with understanding of instructions confirmed by the teach back method. 83 year old woman ex smoker with past medical history of GIB, A fib (off anticoagulation due to GIB), s/p Watchman device insertion, HTN, HLD, diverticulosis, non-erosive gastritis, aortic stenosis and hypothyroidism presented early October for elective TAVR due to progressive dyspnea and fatigue related to AS.  On 10/11/23, she went to the cath lab for TAVR. Pre-op TTE though, revealed decreased EF from previous study in June. June cardiac cath did not reveal significant CAD, Decision was made to shoot coronaries prior to inserting valve. Upon coronary injection patient was found to have critical left main coronary artery disease. TAVR canceled, following day patient underwent successful PCI of LM (Plavix / ASA) and successful BAV of AV. She was discharged with plan of elective TAVR.     Patient presents today for TAVR. Her post-op course was uncomplicated and the patient was discharged home in stable condition on POD#2 with an MCOT monitor.         A discussion was conducted with the patient regarding the importance and benefits of participating in a cardiothoracic rehabilitation program. Contact information given to the patient. Patient instructed to inquire further upon seeing their cardiologist post op.      A MCOT device was applied to patient prior to discharge. Patient inform it is used to monitor for rhythm disturbances after TAVR for 30 days. Patient instructed on use and care of device with understanding of instructions confirmed by the teach back method.

## 2023-11-15 NOTE — DISCHARGE NOTE PROVIDER - PROVIDER TOKENS
FREE:[LAST:[[facility]],PHONE:[phone]],FAX:[(   [phone]    -],ADDRESS:[[address] [place] [address], [place] [address]]],PROVIDER:[TOKEN:[[id number]],FOLLOWUP:[1 month]],PROVIDER:[TOKEN:[[id number]],FOLLOWUP:[1 week]] PROVIDER:[TOKEN:[[id number]:[id number]],FOLLOWUP:[1 month]],PROVIDER:[TOKEN:[[id number]],SCHEDULEDAPPT:[11/22/2023],SCHEDULEDAPPTTIME:[09:30 AM]],FREE:[LAST:[[facility]],PHONE:[phone]],FAX:[(   )    -],ADDRESS:[[address] [place] [address]]]

## 2023-11-15 NOTE — BRIEF OPERATIVE NOTE - NSICDXBRIEFPROCEDURE_GEN_ALL_CORE_FT
PROCEDURES:  Percutaneous transcatheter aortic valve replacement (TAVR) by femoral artery approach 15-Nov-2023 17:49:06 utilizing a 29mm Medtronic Evolut FX transcatheter Aortic Valve, Serial # [id number] Oscar Jordan

## 2023-11-15 NOTE — PRE-ANESTHESIA EVALUATION ADULT - NSANTHOSAYNRD_GEN_A_CORE
No. MARIETTA screening performed.  STOP BANG Legend: 0-2 = LOW Risk; 3-4 = INTERMEDIATE Risk; 5-8 = HIGH Risk

## 2023-11-16 ENCOUNTER — APPOINTMENT (OUTPATIENT)
Dept: ELECTROPHYSIOLOGY | Facility: CLINIC | Age: 83
End: 2023-11-16

## 2023-11-16 LAB
ALBUMIN SERPL ELPH-MCNC: 3.6 G/DL — SIGNIFICANT CHANGE UP (ref 3.5–5.2)
ALBUMIN SERPL ELPH-MCNC: 3.6 G/DL — SIGNIFICANT CHANGE UP (ref 3.5–5.2)
ALP SERPL-CCNC: 96 U/L — SIGNIFICANT CHANGE UP (ref 30–115)
ALP SERPL-CCNC: 96 U/L — SIGNIFICANT CHANGE UP (ref 30–115)
ALT FLD-CCNC: 12 U/L — SIGNIFICANT CHANGE UP (ref 0–41)
ALT FLD-CCNC: 12 U/L — SIGNIFICANT CHANGE UP (ref 0–41)
ANION GAP SERPL CALC-SCNC: 9 MMOL/L — SIGNIFICANT CHANGE UP (ref 7–14)
ANION GAP SERPL CALC-SCNC: 9 MMOL/L — SIGNIFICANT CHANGE UP (ref 7–14)
AST SERPL-CCNC: 21 U/L — SIGNIFICANT CHANGE UP (ref 0–41)
AST SERPL-CCNC: 21 U/L — SIGNIFICANT CHANGE UP (ref 0–41)
BASOPHILS # BLD AUTO: 0 K/UL — SIGNIFICANT CHANGE UP (ref 0–0.2)
BASOPHILS # BLD AUTO: 0 K/UL — SIGNIFICANT CHANGE UP (ref 0–0.2)
BASOPHILS NFR BLD AUTO: 0 % — SIGNIFICANT CHANGE UP (ref 0–1)
BASOPHILS NFR BLD AUTO: 0 % — SIGNIFICANT CHANGE UP (ref 0–1)
BILIRUB SERPL-MCNC: 0.5 MG/DL — SIGNIFICANT CHANGE UP (ref 0.2–1.2)
BILIRUB SERPL-MCNC: 0.5 MG/DL — SIGNIFICANT CHANGE UP (ref 0.2–1.2)
BUN SERPL-MCNC: 18 MG/DL — SIGNIFICANT CHANGE UP (ref 10–20)
BUN SERPL-MCNC: 18 MG/DL — SIGNIFICANT CHANGE UP (ref 10–20)
CALCIUM SERPL-MCNC: 8.8 MG/DL — SIGNIFICANT CHANGE UP (ref 8.4–10.5)
CALCIUM SERPL-MCNC: 8.8 MG/DL — SIGNIFICANT CHANGE UP (ref 8.4–10.5)
CHLORIDE SERPL-SCNC: 100 MMOL/L — SIGNIFICANT CHANGE UP (ref 98–110)
CHLORIDE SERPL-SCNC: 100 MMOL/L — SIGNIFICANT CHANGE UP (ref 98–110)
CO2 SERPL-SCNC: 26 MMOL/L — SIGNIFICANT CHANGE UP (ref 17–32)
CO2 SERPL-SCNC: 26 MMOL/L — SIGNIFICANT CHANGE UP (ref 17–32)
CREAT SERPL-MCNC: 0.9 MG/DL — SIGNIFICANT CHANGE UP (ref 0.7–1.5)
CREAT SERPL-MCNC: 0.9 MG/DL — SIGNIFICANT CHANGE UP (ref 0.7–1.5)
EGFR: 63 ML/MIN/1.73M2 — SIGNIFICANT CHANGE UP
EGFR: 63 ML/MIN/1.73M2 — SIGNIFICANT CHANGE UP
EOSINOPHIL # BLD AUTO: 0 K/UL — SIGNIFICANT CHANGE UP (ref 0–0.7)
EOSINOPHIL # BLD AUTO: 0 K/UL — SIGNIFICANT CHANGE UP (ref 0–0.7)
EOSINOPHIL NFR BLD AUTO: 0 % — SIGNIFICANT CHANGE UP (ref 0–8)
EOSINOPHIL NFR BLD AUTO: 0 % — SIGNIFICANT CHANGE UP (ref 0–8)
GLUCOSE SERPL-MCNC: 137 MG/DL — HIGH (ref 70–99)
GLUCOSE SERPL-MCNC: 137 MG/DL — HIGH (ref 70–99)
HCT VFR BLD CALC: 30 % — LOW (ref 37–47)
HCT VFR BLD CALC: 30 % — LOW (ref 37–47)
HGB BLD-MCNC: 9.4 G/DL — LOW (ref 12–16)
HGB BLD-MCNC: 9.4 G/DL — LOW (ref 12–16)
IMM GRANULOCYTES NFR BLD AUTO: 0.3 % — SIGNIFICANT CHANGE UP (ref 0.1–0.3)
IMM GRANULOCYTES NFR BLD AUTO: 0.3 % — SIGNIFICANT CHANGE UP (ref 0.1–0.3)
LYMPHOCYTES # BLD AUTO: 0.97 K/UL — LOW (ref 1.2–3.4)
LYMPHOCYTES # BLD AUTO: 0.97 K/UL — LOW (ref 1.2–3.4)
LYMPHOCYTES # BLD AUTO: 15.3 % — LOW (ref 20.5–51.1)
LYMPHOCYTES # BLD AUTO: 15.3 % — LOW (ref 20.5–51.1)
MAGNESIUM SERPL-MCNC: 2.3 MG/DL — SIGNIFICANT CHANGE UP (ref 1.8–2.4)
MAGNESIUM SERPL-MCNC: 2.3 MG/DL — SIGNIFICANT CHANGE UP (ref 1.8–2.4)
MCHC RBC-ENTMCNC: 28.4 PG — SIGNIFICANT CHANGE UP (ref 27–31)
MCHC RBC-ENTMCNC: 28.4 PG — SIGNIFICANT CHANGE UP (ref 27–31)
MCHC RBC-ENTMCNC: 31.3 G/DL — LOW (ref 32–37)
MCHC RBC-ENTMCNC: 31.3 G/DL — LOW (ref 32–37)
MCV RBC AUTO: 90.6 FL — SIGNIFICANT CHANGE UP (ref 81–99)
MCV RBC AUTO: 90.6 FL — SIGNIFICANT CHANGE UP (ref 81–99)
MONOCYTES # BLD AUTO: 0.12 K/UL — SIGNIFICANT CHANGE UP (ref 0.1–0.6)
MONOCYTES # BLD AUTO: 0.12 K/UL — SIGNIFICANT CHANGE UP (ref 0.1–0.6)
MONOCYTES NFR BLD AUTO: 1.9 % — SIGNIFICANT CHANGE UP (ref 1.7–9.3)
MONOCYTES NFR BLD AUTO: 1.9 % — SIGNIFICANT CHANGE UP (ref 1.7–9.3)
NEUTROPHILS # BLD AUTO: 5.22 K/UL — SIGNIFICANT CHANGE UP (ref 1.4–6.5)
NEUTROPHILS # BLD AUTO: 5.22 K/UL — SIGNIFICANT CHANGE UP (ref 1.4–6.5)
NEUTROPHILS NFR BLD AUTO: 82.5 % — HIGH (ref 42.2–75.2)
NEUTROPHILS NFR BLD AUTO: 82.5 % — HIGH (ref 42.2–75.2)
NRBC # BLD: 0 /100 WBCS — SIGNIFICANT CHANGE UP (ref 0–0)
NRBC # BLD: 0 /100 WBCS — SIGNIFICANT CHANGE UP (ref 0–0)
PLATELET # BLD AUTO: 212 K/UL — SIGNIFICANT CHANGE UP (ref 130–400)
PLATELET # BLD AUTO: 212 K/UL — SIGNIFICANT CHANGE UP (ref 130–400)
PMV BLD: 11.3 FL — HIGH (ref 7.4–10.4)
PMV BLD: 11.3 FL — HIGH (ref 7.4–10.4)
POTASSIUM SERPL-MCNC: 4.5 MMOL/L — SIGNIFICANT CHANGE UP (ref 3.5–5)
POTASSIUM SERPL-MCNC: 4.5 MMOL/L — SIGNIFICANT CHANGE UP (ref 3.5–5)
POTASSIUM SERPL-SCNC: 4.5 MMOL/L — SIGNIFICANT CHANGE UP (ref 3.5–5)
POTASSIUM SERPL-SCNC: 4.5 MMOL/L — SIGNIFICANT CHANGE UP (ref 3.5–5)
PROT SERPL-MCNC: 6.3 G/DL — SIGNIFICANT CHANGE UP (ref 6–8)
PROT SERPL-MCNC: 6.3 G/DL — SIGNIFICANT CHANGE UP (ref 6–8)
RBC # BLD: 3.31 M/UL — LOW (ref 4.2–5.4)
RBC # BLD: 3.31 M/UL — LOW (ref 4.2–5.4)
RBC # FLD: 15.3 % — HIGH (ref 11.5–14.5)
RBC # FLD: 15.3 % — HIGH (ref 11.5–14.5)
SODIUM SERPL-SCNC: 135 MMOL/L — SIGNIFICANT CHANGE UP (ref 135–146)
SODIUM SERPL-SCNC: 135 MMOL/L — SIGNIFICANT CHANGE UP (ref 135–146)
WBC # BLD: 6.33 K/UL — SIGNIFICANT CHANGE UP (ref 4.8–10.8)
WBC # BLD: 6.33 K/UL — SIGNIFICANT CHANGE UP (ref 4.8–10.8)
WBC # FLD AUTO: 6.33 K/UL — SIGNIFICANT CHANGE UP (ref 4.8–10.8)
WBC # FLD AUTO: 6.33 K/UL — SIGNIFICANT CHANGE UP (ref 4.8–10.8)

## 2023-11-16 PROCEDURE — 93010 ELECTROCARDIOGRAM REPORT: CPT

## 2023-11-16 PROCEDURE — 99232 SBSQ HOSP IP/OBS MODERATE 35: CPT

## 2023-11-16 PROCEDURE — 93306 TTE W/DOPPLER COMPLETE: CPT | Mod: 26

## 2023-11-16 PROCEDURE — 71045 X-RAY EXAM CHEST 1 VIEW: CPT | Mod: 26

## 2023-11-16 RX ADMIN — Medication 81 MILLIGRAM(S): at 12:28

## 2023-11-16 RX ADMIN — CLOPIDOGREL BISULFATE 75 MILLIGRAM(S): 75 TABLET, FILM COATED ORAL at 12:28

## 2023-11-16 RX ADMIN — CHLORHEXIDINE GLUCONATE 5 MILLILITER(S): 213 SOLUTION TOPICAL at 06:23

## 2023-11-16 RX ADMIN — Medication 112 MICROGRAM(S): at 06:23

## 2023-11-16 RX ADMIN — POLYETHYLENE GLYCOL 3350 17 GRAM(S): 17 POWDER, FOR SOLUTION ORAL at 12:28

## 2023-11-16 RX ADMIN — Medication 400 MILLIGRAM(S): at 11:16

## 2023-11-16 RX ADMIN — CHLORHEXIDINE GLUCONATE 1 APPLICATION(S): 213 SOLUTION TOPICAL at 12:29

## 2023-11-16 RX ADMIN — Medication 40 MILLIGRAM(S): at 06:23

## 2023-11-16 RX ADMIN — Medication 250 MILLIGRAM(S): at 19:41

## 2023-11-16 RX ADMIN — PANTOPRAZOLE SODIUM 40 MILLIGRAM(S): 20 TABLET, DELAYED RELEASE ORAL at 12:28

## 2023-11-16 RX ADMIN — Medication 25 MILLIGRAM(S): at 12:28

## 2023-11-16 RX ADMIN — Medication 250 MILLIGRAM(S): at 08:17

## 2023-11-16 NOTE — PROGRESS NOTE ADULT - SUBJECTIVE AND OBJECTIVE BOX
OPERATIVE PROCEDURE(s):                POD #                       83yFemale  SURGEON(s): NAM Cardenas  SUBJECTIVE ASSESSMENT:  Patient has no complaints at this time.    Vital Signs Last 24 Hrs  T(F): 96.4 (15 Nov 2023 20:00), Max: 96.4 (15 Nov 2023 20:00)  HR: 85 (2023 08:00) (77 - 96)  BP: 100/78 (2023 08:00) (100/78 - 158/71)  BP(mean): 84 (2023 08:00) (82 - 102)  ABP: 105/33 (2023 08:00) (86/15 - 131/50)  ABP(mean): 61 (2023 08:00)  RR: 35 (2023 08:00) (20 - 39)  SpO2: 93% (2023 08:00) (93% - 100%)  CVP(mm Hg): --  CVP(cm H2O): --  CO: --  CI: --  PA: --  SVR: --    I&O's Detail    15 Nov 2023 07:01  -  2023 07:00  --------------------------------------------------------  IN:    IV PiggyBack: 250 mL    Oral Fluid: 240 mL    sodium chloride 0.9%: 60 mL  Total IN: 550 mL    OUT:    Indwelling Catheter - Urethral (mL): 370 mL    Voided (mL): 950 mL  Total OUT: 1320 mL        Net:   I&O's Detail    15 Nov 2023 07:01  -  2023 07:00  --------------------------------------------------------  Total NET: -770 mL        CAPILLARY BLOOD GLUCOSE        Physical Exam:  General: NAD; A&Ox3/Patient is intubated and sedated  Cardiac: S1/S2, RRR, no murmur, no rubs  Lungs: unlabored respirations, CTA b/l, no wheeze, no rales, no crackles  Abdomen: Soft/NT/ND; positive bowel sounds x 4  Sternum: Intact, no click, incision healing well with no drainage  Incisions: Incisions clean/dry/intact  Extremities: No edema b/l lower extremities; good capillary refill; no cyanosis; palpable 1+ pedal pulses b/l    Central Venous Catheter: Yes[]  No[] , If Yes indication:           Day #  Hdez Catheter: Yes  [] , No  [] , If yes indication:                      Day #  NGT: Yes [] No [] ,    If Yes Placement:                                     Day #  EPICARDIAL WIRES:  [] YES [] NO                                              Day #  BOWEL MOVEMENT:  [] YES [] NO, If No, Timing since last BM:      Day #  CHEST TUBE(Left/Right):  [] YES [] NO, If yes -  AIR LEAKS:  [] YES [] NO        LABS:                        9.4<L>  6.33  )-----------( 212      ( 2023 04:25 )             30.0<L>                        9.6<L>  12.34<H> )-----------( 242      ( 15 Nov 2023 18:42 )             30.5<L>    11-16    135  |  100  |  18  ----------------------------<  137<H>  4.5   |  26  |  0.9  11-15    134<L>  |  99  |  19  ----------------------------<  112<H>  4.4   |  25  |  0.9    Ca    8.8      2023 04:25  Mg     2.3     11-16    TPro  6.3 [6.0 - 8.0]  /  Alb  3.6 [3.5 - 5.2]  /  TBili  0.5 [0.2 - 1.2]  /  DBili  x   /  AST  21 [0 - 41]  /  ALT  12 [0 - 41]  /  AlkPhos  96 [30 - 115]  11-16    PT/INR - ( 15 Nov 2023 18:42 )   PT: ;   INR: 1.18 ratio         PTT - ( 15 Nov 2023 18:42 )  PTT:70.9 sec  Urinalysis Basic - ( 2023 04:25 )    Color: x / Appearance: x / SG: x / pH: x  Gluc: 137 mg/dL / Ketone: x  / Bili: x / Urobili: x   Blood: x / Protein: x / Nitrite: x   Leuk Esterase: x / RBC: x / WBC x   Sq Epi: x / Non Sq Epi: x / Bacteria: x        RADIOLOGY & ADDITIONAL TESTS:  CXR:   EK Lead ECG:   Ventricular Rate 89 BPM    QRS Duration 142 ms    Q-T Interval 438 ms    QTC Calculation(Bazett) 532 ms    R Axis -60 degrees    T Axis 30 degrees    Diagnosis Line Atrial fibrillation  Left axis deviation  Left bundle branch block  Abnormal ECG    Confirmed by LOIDA SELBY, [facility] ([id number]) on 11/15/2023 10:59:42 PM (11-15-23 @ 17:43)    MEDICATIONS  (STANDING):  aspirin enteric coated 81 milliGRAM(s) Oral daily  aspirin enteric coated 81 milliGRAM(s) Oral daily  chlorhexidine 0.12% Liquid 5 milliLiter(s) Oral Mucosa two times a day  chlorhexidine 2% Cloths 1 Application(s) Topical daily  clopidogrel Tablet 75 milliGRAM(s) Oral daily  clopidogrel Tablet 75 milliGRAM(s) Oral daily  dexMEDEtomidine Infusion 0.25 MICROgram(s)/kG/Hr (6 mL/Hr) IV Continuous <Continuous>  furosemide    Tablet 40 milliGRAM(s) Oral daily  hydrOXYzine hydrochloride 25 milliGRAM(s) Oral daily  levothyroxine 112 MICROGram(s) Oral daily  niCARdipine Infusion 5 mG/Hr (25 mL/Hr) IV Continuous <Continuous>  nitroglycerin  Infusion 5 MICROgram(s)/Min (1.5 mL/Hr) IV Continuous <Continuous>  pantoprazole    Tablet 40 milliGRAM(s) Oral daily  polyethylene glycol 3350 17 Gram(s) Oral daily  sodium chloride 0.9%. 1000 milliLiter(s) (20 mL/Hr) IV Continuous <Continuous>  vancomycin  IVPB 1000 milliGRAM(s) IV Intermittent every 12 hours    MEDICATIONS  (PRN):  acetaminophen     Tablet .. 650 milliGRAM(s) Oral every 6 hours PRN Temp greater or equal to 38C (100.4F), Mild Pain (1 - 3)  acetaminophen   IVPB .. 1000 milliGRAM(s) IV Intermittent once PRN Moderate Pain (4 - 6)  ondansetron  IVPB 4 milliGRAM(s) IV Intermittent once PRN Nausea and/or Vomiting  oxyCODONE    IR 5 milliGRAM(s) Oral every 6 hours PRN Moderate Pain (4 - 6)    HEPARIN:  [] YES [] NO  Dose: XX UNITS/HR UNITS Q8H  LOVENOX:[] YES [] NO  Dose: XX mg Q24H  COUMADIN: []  YES [] NO  Dose: XX mg  Q24H  SCD's: YES b/l  GI Prophylaxis: Protonix [], Pepcid [], None [], (Contra-indication:.....)    Post-Op Aspirin: Yes [],  No [], If No, then contra-indication:  Post-Op Statin: Yes [], No[], If No, then contra-indication:  Post-Op Beta-Blockers: Yes [], No [], If No, then contra-indication:    Allergies:  penicillins (Rash)      Ambulation/Activity Status: Ambulates several times daily with assistance.    Assessment/Plan:  83y Female status-post .....  - Case and plan discussed with CTU Intensivist and CT Surgeon - Dr. Chou/Thang/Theresa  - Continue CTU supportive care    - Continue DVT/GI prophylaxis  - Incentive Spirometry 10 times an hour  - Continue to advance physical activity as tolerated and continue PT/OT as directed  1. CAD: Continue ASA, statin, BB  2. HTN:   3. A. Fib:   4. COPD/Hypoxia:   5. DM/Glucose Control:                              OPERATIVE PROCEDURE(s):       TAVR         POD #  1                    83yFemale  SURGEON(s): NAM Cardenas  SUBJECTIVE ASSESSMENT:  Patient has no complaints at this time.    Vital Signs Last 24 Hrs  T(F): 96.4 (15 Nov 2023 20:00), Max: 96.4 (15 Nov 2023 20:00)  HR: 85 (2023 08:00) (77 - 96)  BP: 100/78 (2023 08:00) (100/78 - 158/71)  BP(mean): 84 (2023 08:00) (82 - 102)  ABP: 105/33 (2023 08:00) (86/15 - 131/50)  ABP(mean): 61 (2023 08:00)  RR: 35 (2023 08:00) (20 - 39)  SpO2: 93% (2023 08:00) (93% - 100%)       I&O's Detail    15 Nov 2023 07:01  -  2023 07:00  --------------------------------------------------------  IN:    IV PiggyBack: 250 mL    Oral Fluid: 240 mL    sodium chloride 0.9%: 60 mL  Total IN: 550 mL    OUT:    Indwelling Catheter - Urethral (mL): 370 mL    Voided (mL): 950 mL  Total OUT: 1320 mL        Net:   I&O's Detail    15 Nov 2023 07:01  -  2023 07:00  --------------------------------------------------------  Total NET: -770 mL        Physical Exam:  General: NAD; A&Ox3  Cardiac: S1/S2, RRR, no murmur, no rubs  Lungs: unlabored respirations, CTA b/l, no wheeze, no rales, no crackles  Abdomen: Soft/NT/ND; positive bowel sounds x 4  Extremities: No edema b/l lower extremities; good capillary refill; no cyanosis; palpable 1+ pedal pulses b/l         LABS:                        9.4<L>  6.33  )-----------( 212      ( 2023 04:25 )             30.0<L>                        9.6<L>  12.34<H> )-----------( 242      ( 15 Nov 2023 18:42 )             30.5<L>        135  |  100  |  18  ----------------------------<  137<H>  4.5   |  26  |  0.9  11-15    134<L>  |  99  |  19  ----------------------------<  112<H>  4.4   |  25  |  0.9    Ca    8.8      2023 04:25  Mg     2.3         TPro  6.3 [6.0 - 8.0]  /  Alb  3.6 [3.5 - 5.2]  /  TBili  0.5 [0.2 - 1.2]  /  DBili  x   /  AST  21 [0 - 41]  /  ALT  12 [0 - 41]  /  AlkPhos  96 [30 - 115]      PT/INR - ( 15 Nov 2023 18:42 )   PT: ;   INR: 1.18 ratio         PTT - ( 15 Nov 2023 18:42 )  PTT:70.9 sec  Urinalysis Basic - ( 2023 04:25 )    Color: x / Appearance: x / SG: x / pH: x  Gluc: 137 mg/dL / Ketone: x  / Bili: x / Urobili: x   Blood: x / Protein: x / Nitrite: x   Leuk Esterase: x / RBC: x / WBC x   Sq Epi: x / Non Sq Epi: x / Bacteria: x        RADIOLOGY & ADDITIONAL TESTS:     EK Lead ECG:   Ventricular Rate 89 BPM    QRS Duration 142 ms    Q-T Interval 438 ms    QTC Calculation(Bazett) 532 ms    R Axis -60 degrees    T Axis 30 degrees    Diagnosis Line Atrial fibrillation  Left axis deviation  Left bundle branch block  Abnormal ECG    Confirmed by LOIDA SELBY, [facility] ([id number]) on 11/15/2023 10:59:42 PM (11-15-23 @ 17:43)    MEDICATIONS  (STANDING):  aspirin enteric coated 81 milliGRAM(s) Oral daily  aspirin enteric coated 81 milliGRAM(s) Oral daily  chlorhexidine 0.12% Liquid 5 milliLiter(s) Oral Mucosa two times a day  chlorhexidine 2% Cloths 1 Application(s) Topical daily  clopidogrel Tablet 75 milliGRAM(s) Oral daily  clopidogrel Tablet 75 milliGRAM(s) Oral daily  dexMEDEtomidine Infusion 0.25 MICROgram(s)/kG/Hr (6 mL/Hr) IV Continuous <Continuous>  furosemide    Tablet 40 milliGRAM(s) Oral daily  hydrOXYzine hydrochloride 25 milliGRAM(s) Oral daily  levothyroxine 112 MICROGram(s) Oral daily  niCARdipine Infusion 5 mG/Hr (25 mL/Hr) IV Continuous <Continuous>  nitroglycerin  Infusion 5 MICROgram(s)/Min (1.5 mL/Hr) IV Continuous <Continuous>  pantoprazole    Tablet 40 milliGRAM(s) Oral daily  polyethylene glycol 3350 17 Gram(s) Oral daily  sodium chloride 0.9%. 1000 milliLiter(s) (20 mL/Hr) IV Continuous <Continuous>  vancomycin  IVPB 1000 milliGRAM(s) IV Intermittent every 12 hours    MEDICATIONS  (PRN):  acetaminophen     Tablet .. 650 milliGRAM(s) Oral every 6 hours PRN Temp greater or equal to 38C (100.4F), Mild Pain (1 - 3)  acetaminophen   IVPB .. 1000 milliGRAM(s) IV Intermittent once PRN Moderate Pain (4 - 6)  ondansetron  IVPB 4 milliGRAM(s) IV Intermittent once PRN Nausea and/or Vomiting  oxyCODONE    IR 5 milliGRAM(s) Oral every 6 hours PRN Moderate Pain (4 - 6)       Allergies:  penicillins (Rash)      Ambulation/Activity Status: Ambulates several times daily with assistance.    Assessment/Plan:  83y Female status-post  TAVR         POD #  1       - Case and plan discussed with CTU Intensivist and CT Surgeon - Dr. Chou/Thang/Theresa  - Continue CTU supportive care    - Continue DVT/GI prophylaxis  - Incentive Spirometry 10 times an hour  - Continue to advance physical activity as tolerated and continue PT/OT as directed  1. D/C fem lines  2. Downgrade to tele

## 2023-11-16 NOTE — PROGRESS NOTE ADULT - NS ATTEND AMEND GEN_ALL_CORE FT
CT surgery attending note    Patient seen and examined on rounds as described above    Postop day 1  Status post transcatheter aortic valve replacement    Doing very well  Hemodynamically stable  Neurologically intact  Groins are clean and dry without hematomas    Heart rhythm is stable    Overall the patient is making an excellent recovery    The patient, the family and the care team updated regarding the plan and progress

## 2023-11-17 ENCOUNTER — TRANSCRIPTION ENCOUNTER (OUTPATIENT)
Age: 83
End: 2023-11-17

## 2023-11-17 VITALS
RESPIRATION RATE: 18 BRPM | TEMPERATURE: 98 F | HEART RATE: 119 BPM | DIASTOLIC BLOOD PRESSURE: 66 MMHG | OXYGEN SATURATION: 96 % | SYSTOLIC BLOOD PRESSURE: 115 MMHG

## 2023-11-17 PROBLEM — I25.10 ATHEROSCLEROTIC HEART DISEASE OF NATIVE CORONARY ARTERY WITHOUT ANGINA PECTORIS: Chronic | Status: ACTIVE | Noted: 2023-11-06

## 2023-11-17 PROBLEM — Z87.19 PERSONAL HISTORY OF OTHER DISEASES OF THE DIGESTIVE SYSTEM: Chronic | Status: ACTIVE | Noted: 2023-11-06

## 2023-11-17 LAB
ALBUMIN SERPL ELPH-MCNC: 3.5 G/DL — SIGNIFICANT CHANGE UP (ref 3.5–5.2)
ALBUMIN SERPL ELPH-MCNC: 3.5 G/DL — SIGNIFICANT CHANGE UP (ref 3.5–5.2)
ALP SERPL-CCNC: 93 U/L — SIGNIFICANT CHANGE UP (ref 30–115)
ALP SERPL-CCNC: 93 U/L — SIGNIFICANT CHANGE UP (ref 30–115)
ALT FLD-CCNC: 13 U/L — SIGNIFICANT CHANGE UP (ref 0–41)
ALT FLD-CCNC: 13 U/L — SIGNIFICANT CHANGE UP (ref 0–41)
ANION GAP SERPL CALC-SCNC: 10 MMOL/L — SIGNIFICANT CHANGE UP (ref 7–14)
ANION GAP SERPL CALC-SCNC: 10 MMOL/L — SIGNIFICANT CHANGE UP (ref 7–14)
AST SERPL-CCNC: 23 U/L — SIGNIFICANT CHANGE UP (ref 0–41)
AST SERPL-CCNC: 23 U/L — SIGNIFICANT CHANGE UP (ref 0–41)
BASOPHILS # BLD AUTO: 0.03 K/UL — SIGNIFICANT CHANGE UP (ref 0–0.2)
BASOPHILS # BLD AUTO: 0.03 K/UL — SIGNIFICANT CHANGE UP (ref 0–0.2)
BASOPHILS NFR BLD AUTO: 0.3 % — SIGNIFICANT CHANGE UP (ref 0–1)
BASOPHILS NFR BLD AUTO: 0.3 % — SIGNIFICANT CHANGE UP (ref 0–1)
BILIRUB SERPL-MCNC: 0.5 MG/DL — SIGNIFICANT CHANGE UP (ref 0.2–1.2)
BILIRUB SERPL-MCNC: 0.5 MG/DL — SIGNIFICANT CHANGE UP (ref 0.2–1.2)
BUN SERPL-MCNC: 20 MG/DL — SIGNIFICANT CHANGE UP (ref 10–20)
BUN SERPL-MCNC: 20 MG/DL — SIGNIFICANT CHANGE UP (ref 10–20)
CALCIUM SERPL-MCNC: 8.4 MG/DL — SIGNIFICANT CHANGE UP (ref 8.4–10.5)
CALCIUM SERPL-MCNC: 8.4 MG/DL — SIGNIFICANT CHANGE UP (ref 8.4–10.5)
CHLORIDE SERPL-SCNC: 101 MMOL/L — SIGNIFICANT CHANGE UP (ref 98–110)
CHLORIDE SERPL-SCNC: 101 MMOL/L — SIGNIFICANT CHANGE UP (ref 98–110)
CO2 SERPL-SCNC: 27 MMOL/L — SIGNIFICANT CHANGE UP (ref 17–32)
CO2 SERPL-SCNC: 27 MMOL/L — SIGNIFICANT CHANGE UP (ref 17–32)
CREAT SERPL-MCNC: 0.9 MG/DL — SIGNIFICANT CHANGE UP (ref 0.7–1.5)
CREAT SERPL-MCNC: 0.9 MG/DL — SIGNIFICANT CHANGE UP (ref 0.7–1.5)
EGFR: 63 ML/MIN/1.73M2 — SIGNIFICANT CHANGE UP
EGFR: 63 ML/MIN/1.73M2 — SIGNIFICANT CHANGE UP
EOSINOPHIL # BLD AUTO: 0.12 K/UL — SIGNIFICANT CHANGE UP (ref 0–0.7)
EOSINOPHIL # BLD AUTO: 0.12 K/UL — SIGNIFICANT CHANGE UP (ref 0–0.7)
EOSINOPHIL NFR BLD AUTO: 1.2 % — SIGNIFICANT CHANGE UP (ref 0–8)
EOSINOPHIL NFR BLD AUTO: 1.2 % — SIGNIFICANT CHANGE UP (ref 0–8)
GLUCOSE SERPL-MCNC: 98 MG/DL — SIGNIFICANT CHANGE UP (ref 70–99)
GLUCOSE SERPL-MCNC: 98 MG/DL — SIGNIFICANT CHANGE UP (ref 70–99)
HCT VFR BLD CALC: 27.7 % — LOW (ref 37–47)
HCT VFR BLD CALC: 27.7 % — LOW (ref 37–47)
HGB BLD-MCNC: 8.8 G/DL — LOW (ref 12–16)
HGB BLD-MCNC: 8.8 G/DL — LOW (ref 12–16)
IMM GRANULOCYTES NFR BLD AUTO: 0.4 % — HIGH (ref 0.1–0.3)
IMM GRANULOCYTES NFR BLD AUTO: 0.4 % — HIGH (ref 0.1–0.3)
LYMPHOCYTES # BLD AUTO: 1.22 K/UL — SIGNIFICANT CHANGE UP (ref 1.2–3.4)
LYMPHOCYTES # BLD AUTO: 1.22 K/UL — SIGNIFICANT CHANGE UP (ref 1.2–3.4)
LYMPHOCYTES # BLD AUTO: 11.8 % — LOW (ref 20.5–51.1)
LYMPHOCYTES # BLD AUTO: 11.8 % — LOW (ref 20.5–51.1)
MAGNESIUM SERPL-MCNC: 2.2 MG/DL — SIGNIFICANT CHANGE UP (ref 1.8–2.4)
MAGNESIUM SERPL-MCNC: 2.2 MG/DL — SIGNIFICANT CHANGE UP (ref 1.8–2.4)
MCHC RBC-ENTMCNC: 28.7 PG — SIGNIFICANT CHANGE UP (ref 27–31)
MCHC RBC-ENTMCNC: 28.7 PG — SIGNIFICANT CHANGE UP (ref 27–31)
MCHC RBC-ENTMCNC: 31.8 G/DL — LOW (ref 32–37)
MCHC RBC-ENTMCNC: 31.8 G/DL — LOW (ref 32–37)
MCV RBC AUTO: 90.2 FL — SIGNIFICANT CHANGE UP (ref 81–99)
MCV RBC AUTO: 90.2 FL — SIGNIFICANT CHANGE UP (ref 81–99)
MONOCYTES # BLD AUTO: 0.95 K/UL — HIGH (ref 0.1–0.6)
MONOCYTES # BLD AUTO: 0.95 K/UL — HIGH (ref 0.1–0.6)
MONOCYTES NFR BLD AUTO: 9.2 % — SIGNIFICANT CHANGE UP (ref 1.7–9.3)
MONOCYTES NFR BLD AUTO: 9.2 % — SIGNIFICANT CHANGE UP (ref 1.7–9.3)
NEUTROPHILS # BLD AUTO: 7.95 K/UL — HIGH (ref 1.4–6.5)
NEUTROPHILS # BLD AUTO: 7.95 K/UL — HIGH (ref 1.4–6.5)
NEUTROPHILS NFR BLD AUTO: 77.1 % — HIGH (ref 42.2–75.2)
NEUTROPHILS NFR BLD AUTO: 77.1 % — HIGH (ref 42.2–75.2)
NRBC # BLD: 0 /100 WBCS — SIGNIFICANT CHANGE UP (ref 0–0)
NRBC # BLD: 0 /100 WBCS — SIGNIFICANT CHANGE UP (ref 0–0)
PLATELET # BLD AUTO: 188 K/UL — SIGNIFICANT CHANGE UP (ref 130–400)
PLATELET # BLD AUTO: 188 K/UL — SIGNIFICANT CHANGE UP (ref 130–400)
PMV BLD: 11.4 FL — HIGH (ref 7.4–10.4)
PMV BLD: 11.4 FL — HIGH (ref 7.4–10.4)
POTASSIUM SERPL-MCNC: 4.2 MMOL/L — SIGNIFICANT CHANGE UP (ref 3.5–5)
POTASSIUM SERPL-MCNC: 4.2 MMOL/L — SIGNIFICANT CHANGE UP (ref 3.5–5)
POTASSIUM SERPL-SCNC: 4.2 MMOL/L — SIGNIFICANT CHANGE UP (ref 3.5–5)
POTASSIUM SERPL-SCNC: 4.2 MMOL/L — SIGNIFICANT CHANGE UP (ref 3.5–5)
PROT SERPL-MCNC: 6.1 G/DL — SIGNIFICANT CHANGE UP (ref 6–8)
PROT SERPL-MCNC: 6.1 G/DL — SIGNIFICANT CHANGE UP (ref 6–8)
RBC # BLD: 3.07 M/UL — LOW (ref 4.2–5.4)
RBC # BLD: 3.07 M/UL — LOW (ref 4.2–5.4)
RBC # FLD: 15.6 % — HIGH (ref 11.5–14.5)
RBC # FLD: 15.6 % — HIGH (ref 11.5–14.5)
SODIUM SERPL-SCNC: 138 MMOL/L — SIGNIFICANT CHANGE UP (ref 135–146)
SODIUM SERPL-SCNC: 138 MMOL/L — SIGNIFICANT CHANGE UP (ref 135–146)
WBC # BLD: 10.31 K/UL — SIGNIFICANT CHANGE UP (ref 4.8–10.8)
WBC # BLD: 10.31 K/UL — SIGNIFICANT CHANGE UP (ref 4.8–10.8)
WBC # FLD AUTO: 10.31 K/UL — SIGNIFICANT CHANGE UP (ref 4.8–10.8)
WBC # FLD AUTO: 10.31 K/UL — SIGNIFICANT CHANGE UP (ref 4.8–10.8)

## 2023-11-17 PROCEDURE — 71045 X-RAY EXAM CHEST 1 VIEW: CPT | Mod: 26

## 2023-11-17 PROCEDURE — 99232 SBSQ HOSP IP/OBS MODERATE 35: CPT

## 2023-11-17 PROCEDURE — 93010 ELECTROCARDIOGRAM REPORT: CPT

## 2023-11-17 RX ORDER — METOPROLOL TARTRATE 50 MG
50 TABLET ORAL DAILY
Refills: 0 | Status: DISCONTINUED | OUTPATIENT
Start: 2023-11-17 | End: 2023-11-17

## 2023-11-17 RX ORDER — FUROSEMIDE 40 MG
1 TABLET ORAL
Qty: 30 | Refills: 0
Start: 2023-11-17 | End: 2023-12-16

## 2023-11-17 RX ORDER — LEVOTHYROXINE SODIUM 125 MCG
1 TABLET ORAL
Qty: 30 | Refills: 0
Start: 2023-11-17 | End: 2023-12-16

## 2023-11-17 RX ORDER — OXYCODONE HYDROCHLORIDE 5 MG/1
1 TABLET ORAL
Qty: 20 | Refills: 0
Start: 2023-11-17 | End: 2023-11-21

## 2023-11-17 RX ORDER — METOPROLOL TARTRATE 50 MG
1 TABLET ORAL
Qty: 30 | Refills: 0
Start: 2023-11-17 | End: 2023-12-16

## 2023-11-17 RX ORDER — METOPROLOL TARTRATE 50 MG
1 TABLET ORAL
Refills: 0 | DISCHARGE

## 2023-11-17 RX ORDER — HYDROXYZINE HCL 10 MG
1 TABLET ORAL
Qty: 30 | Refills: 0
Start: 2023-11-17 | End: 2023-12-16

## 2023-11-17 RX ORDER — ACETAMINOPHEN 500 MG
2 TABLET ORAL
Qty: 0 | Refills: 0 | DISCHARGE
Start: 2023-11-17

## 2023-11-17 RX ORDER — CLOPIDOGREL BISULFATE 75 MG/1
1 TABLET, FILM COATED ORAL
Qty: 90 | Refills: 0
Start: 2023-11-17 | End: 2024-02-14

## 2023-11-17 RX ORDER — PANTOPRAZOLE SODIUM 20 MG/1
1 TABLET, DELAYED RELEASE ORAL
Qty: 30 | Refills: 0
Start: 2023-11-17 | End: 2023-12-16

## 2023-11-17 RX ORDER — HYDROXYZINE HCL 10 MG
1 TABLET ORAL
Refills: 0 | DISCHARGE

## 2023-11-17 RX ORDER — ASPIRIN/CALCIUM CARB/MAGNESIUM 324 MG
1 TABLET ORAL
Qty: 30 | Refills: 0
Start: 2023-11-17 | End: 2023-12-16

## 2023-11-17 RX ADMIN — Medication 81 MILLIGRAM(S): at 11:01

## 2023-11-17 RX ADMIN — CLOPIDOGREL BISULFATE 75 MILLIGRAM(S): 75 TABLET, FILM COATED ORAL at 11:01

## 2023-11-17 RX ADMIN — PANTOPRAZOLE SODIUM 40 MILLIGRAM(S): 20 TABLET, DELAYED RELEASE ORAL at 11:01

## 2023-11-17 RX ADMIN — Medication 112 MICROGRAM(S): at 05:02

## 2023-11-17 RX ADMIN — Medication 40 MILLIGRAM(S): at 10:04

## 2023-11-17 RX ADMIN — Medication 25 MILLIGRAM(S): at 11:01

## 2023-11-17 RX ADMIN — Medication 50 MILLIGRAM(S): at 11:01

## 2023-11-17 RX ADMIN — CHLORHEXIDINE GLUCONATE 5 MILLILITER(S): 213 SOLUTION TOPICAL at 05:01

## 2023-11-17 NOTE — ASU PATIENT PROFILE, ADULT - INTERNATIONAL TRAVEL
History: follow up prediabetes: has been making lifestyle changes: Yes, stays active.   Assessment: established condition   Plan: Reviewed recent A1c from 11/14/2023 that is elevated at 5.7, this puts patient in the prediabetes range.  Counseled on what prediabetes is, long-term effects if it turns into diabetes type 2 and need for lifestyle changes to stop progression.  Advised on following a low carbohydrate, heart healthy eating plan, 30 minutes exercise daily to help promote weight loss.    We will recheck A1c in 6 months.   No

## 2023-11-17 NOTE — DISCHARGE NOTE NURSING/CASE MANAGEMENT/SOCIAL WORK - NSDCPEFALRISK_GEN_ALL_CORE
For information on Fall & Injury Prevention, visit: [email or web address] OR  [email or web address] OR  https://www.cdc.gov/steadi/patient.html

## 2023-11-17 NOTE — PROGRESS NOTE ADULT - SUBJECTIVE AND OBJECTIVE BOX
OPERATIVE PROCEDURE(s):                POD #                       83yFemale  SURGEON(s): NAM Cardenas  SUBJECTIVE ASSESSMENT:  Patient has no complaints at this time.    Vital Signs Last 24 Hrs  T(F): 98 (2023 07:07), Max: 98.4 (2023 21:45)  HR: 119 (2023 07:07) (87 - 119)  BP: 115/66 (2023 07:07) (85/56 - 142/64)  BP(mean): 83 (2023 07:07) (65 - 92)  ABP: 119/45 (2023 17:00) (104/42 - 129/61)  ABP(mean): 71 (2023 17:00)  RR: 18 (2023 07:07) (14 - 42)  SpO2: 96% (2023 07:07) (93% - 100%)  CVP(mm Hg): --  CVP(cm H2O): --  CO: --  CI: --  PA: --  SVR: --    I&O's Detail    2023 07:01  -  2023 07:00  --------------------------------------------------------  IN:    Oral Fluid: 300 mL  Total IN: 300 mL    OUT:    Indwelling Catheter - Urethral (mL): 2100 mL    Voided (mL): 850 mL  Total OUT: 2950 mL        Net:   I&O's Detail    15 Nov 2023 07:01  -  2023 07:00  --------------------------------------------------------  Total NET: -770 mL      2023 07:01  -  2023 07:00  --------------------------------------------------------  Total NET: -2650 mL        CAPILLARY BLOOD GLUCOSE        Physical Exam:  General: NAD; A&Ox3/Patient is intubated and sedated  Cardiac: S1/S2, RRR, no murmur, no rubs  Lungs: unlabored respirations, CTA b/l, no wheeze, no rales, no crackles  Abdomen: Soft/NT/ND; positive bowel sounds x 4  Sternum: Intact, no click, incision healing well with no drainage  Incisions: Incisions clean/dry/intact  Extremities: No edema b/l lower extremities; good capillary refill; no cyanosis; palpable 1+ pedal pulses b/l    Central Venous Catheter: Yes[]  No[] , If Yes indication:           Day #  Hdez Catheter: Yes  [] , No  [] , If yes indication:                      Day #  NGT: Yes [] No [] ,    If Yes Placement:                                     Day #  EPICARDIAL WIRES:  [] YES [] NO                                              Day #  BOWEL MOVEMENT:  [] YES [] NO, If No, Timing since last BM:      Day #  CHEST TUBE(Left/Right):  [] YES [] NO, If yes -  AIR LEAKS:  [] YES [] NO        LABS:                        8.8<L>  10.31 )-----------( 188      ( 2023 04:37 )             27.7<L>                        9.4<L>  6.33  )-----------( 212      ( 2023 04:25 )             30.0<L>    11-17    138  |  101  |  20  ----------------------------<  98  4.2   |  27  |  0.9  -    135  |  100  |  18  ----------------------------<  137<H>  4.5   |  26  |  0.9    Ca    8.4      2023 04:37  Mg     2.2         TPro  6.1 [6.0 - 8.0]  /  Alb  3.5 [3.5 - 5.2]  /  TBili  0.5 [0.2 - 1.2]  /  DBili  x   /  AST  23 [0 - 41]  /  ALT  13 [0 - 41]  /  AlkPhos  93 [30 - 115]  11-17    PT/INR - ( 15 Nov 2023 18:42 )   PT: ;   INR: 1.18 ratio         PTT - ( 15 Nov 2023 18:42 )  PTT:70.9 sec  Urinalysis Basic - ( 2023 04:37 )    Color: x / Appearance: x / SG: x / pH: x  Gluc: 98 mg/dL / Ketone: x  / Bili: x / Urobili: x   Blood: x / Protein: x / Nitrite: x   Leuk Esterase: x / RBC: x / WBC x   Sq Epi: x / Non Sq Epi: x / Bacteria: x        RADIOLOGY & ADDITIONAL TESTS:  CXR:   EK Lead ECG:   Ventricular Rate 88 BPM    QRS Duration 138 ms    Q-T Interval 420 ms    QTC Calculation(Bazett) 508 ms    R Axis 132 degrees    T Axis -21 degrees    Diagnosis Line Atrial fibrillation  Non-specific intra-ventricular conduction block  Possible Anterolateral infarct , age undetermined  Abnormal ECG    Confirmed by Enrique Solitario ([id number]) on 2023 6:35:07 PM (23 @ 15:08)    MEDICATIONS  (STANDING):  aspirin enteric coated 81 milliGRAM(s) Oral daily  chlorhexidine 0.12% Liquid 5 milliLiter(s) Oral Mucosa two times a day  chlorhexidine 2% Cloths 1 Application(s) Topical daily  clopidogrel Tablet 75 milliGRAM(s) Oral daily  furosemide    Tablet 40 milliGRAM(s) Oral daily  hydrOXYzine hydrochloride 25 milliGRAM(s) Oral daily  levothyroxine 112 MICROGram(s) Oral daily  pantoprazole    Tablet 40 milliGRAM(s) Oral daily  polyethylene glycol 3350 17 Gram(s) Oral daily    MEDICATIONS  (PRN):  acetaminophen     Tablet .. 650 milliGRAM(s) Oral every 6 hours PRN Temp greater or equal to 38C (100.4F), Mild Pain (1 - 3)  ondansetron  IVPB 4 milliGRAM(s) IV Intermittent once PRN Nausea and/or Vomiting  oxyCODONE    IR 5 milliGRAM(s) Oral every 6 hours PRN Moderate Pain (4 - 6)    HEPARIN:  [] YES [] NO  Dose: XX UNITS/HR UNITS Q8H  LOVENOX:[] YES [] NO  Dose: XX mg Q24H  COUMADIN: []  YES [] NO  Dose: XX mg  Q24H  SCD's: YES b/l  GI Prophylaxis: Protonix [], Pepcid [], None [], (Contra-indication:.....)    Post-Op Aspirin: Yes [],  No [], If No, then contra-indication:  Post-Op Statin: Yes [], No[], If No, then contra-indication:  Post-Op Beta-Blockers: Yes [], No [], If No, then contra-indication:    Allergies:  penicillins (Rash)      Ambulation/Activity Status: Ambulates several times daily with assistance.    Assessment/Plan:  83y Female status-post .....  - Case and plan discussed with CTU Intensivist and CT Surgeon - Dr. Chou/Thang/Theresa  - Continue CTU supportive care    - Continue DVT/GI prophylaxis  - Incentive Spirometry 10 times an hour  - Continue to advance physical activity as tolerated and continue PT/OT as directed  1. CAD: Continue ASA, statin, BB  2. HTN:   3. A. Fib:   4. COPD/Hypoxia:   5. DM/Glucose Control:                              OPERATIVE PROCEDURE(s):       TAVR         POD #  2           83yFemale  SURGEON(s): NAM Cardenas  SUBJECTIVE ASSESSMENT:  Patient has no complaints at this time.    Vital Signs Last 24 Hrs  T(F): 98 (2023 07:07), Max: 98.4 (2023 21:45)  HR: 119 (2023 07:07) (87 - 119)  BP: 115/66 (2023 07:07) (85/56 - 142/64)  BP(mean): 83 (2023 07:07) (65 - 92)  ABP: 119/45 (2023 17:00) (104/42 - 129/61)  ABP(mean): 71 (2023 17:00)  RR: 18 (2023 07:07) (14 - 42)  SpO2: 96% (2023 07:07) (93% - 100%)      I&O's Detail    2023 07:01  -  2023 07:00  --------------------------------------------------------  IN:    Oral Fluid: 300 mL  Total IN: 300 mL    OUT:    Indwelling Catheter - Urethral (mL): 2100 mL    Voided (mL): 850 mL  Total OUT: 2950 mL        Net:   I&O's Detail    15 Nov 2023 07:01  -  2023 07:00  --------------------------------------------------------  Total NET: -770 mL      2023 07:01  -  2023 07:00  --------------------------------------------------------  Total NET: -2650 mL    Physical Exam:  General: NAD; A&Ox3  Cardiac: S1/S2, RRR, no murmur, no rubs  Lungs: unlabored respirations, CTA b/l, no wheeze, no rales, no crackles  Abdomen: Soft/NT/ND; positive bowel sounds x 4  Extremities: No edema b/l lower extremities; good capillary refill; no cyanosis; palpable 1+ pedal pulses b/l        LABS:                        8.8<L>  10.31 )-----------( 188      ( 2023 04:37 )             27.7<L>                        9.4<L>  6.33  )-----------( 212      ( 2023 04:25 )             30.0<L>    11-17    138  |  101  |  20  ----------------------------<  98  4.2   |  27  |  0.9  11-16    135  |  100  |  18  ----------------------------<  137<H>  4.5   |  26  |  0.9    Ca    8.4      2023 04:37  Mg     2.2     11-17    TPro  6.1 [6.0 - 8.0]  /  Alb  3.5 [3.5 - 5.2]  /  TBili  0.5 [0.2 - 1.2]  /  DBili  x   /  AST  23 [0 - 41]  /  ALT  13 [0 - 41]  /  AlkPhos  93 [30 - 115]  11-17    PT/INR - ( 15 Nov 2023 18:42 )   PT: ;   INR: 1.18 ratio         PTT - ( 15 Nov 2023 18:42 )  PTT:70.9 sec  Urinalysis Basic - ( 2023 04:37 )    Color: x / Appearance: x / SG: x / pH: x  Gluc: 98 mg/dL / Ketone: x  / Bili: x / Urobili: x   Blood: x / Protein: x / Nitrite: x   Leuk Esterase: x / RBC: x / WBC x   Sq Epi: x / Non Sq Epi: x / Bacteria: x      EK Lead ECG:   Ventricular Rate 88 BPM    QRS Duration 138 ms    Q-T Interval 420 ms    QTC Calculation(Bazett) 508 ms    R Axis 132 degrees    T Axis -21 degrees    Diagnosis Line Atrial fibrillation  Non-specific intra-ventricular conduction block  Possible Anterolateral infarct , age undetermined  Abnormal ECG    Confirmed by Enrique Solitario ([id number]) on 2023 6:35:07 PM (23 @ 15:08)    MEDICATIONS  (STANDING):  aspirin enteric coated 81 milliGRAM(s) Oral daily  chlorhexidine 0.12% Liquid 5 milliLiter(s) Oral Mucosa two times a day  chlorhexidine 2% Cloths 1 Application(s) Topical daily  clopidogrel Tablet 75 milliGRAM(s) Oral daily  furosemide    Tablet 40 milliGRAM(s) Oral daily  hydrOXYzine hydrochloride 25 milliGRAM(s) Oral daily  levothyroxine 112 MICROGram(s) Oral daily  pantoprazole    Tablet 40 milliGRAM(s) Oral daily  polyethylene glycol 3350 17 Gram(s) Oral daily    MEDICATIONS  (PRN):  acetaminophen     Tablet .. 650 milliGRAM(s) Oral every 6 hours PRN Temp greater or equal to 38C (100.4F), Mild Pain (1 - 3)  ondansetron  IVPB 4 milliGRAM(s) IV Intermittent once PRN Nausea and/or Vomiting  oxyCODONE    IR 5 milliGRAM(s) Oral every 6 hours PRN Moderate Pain (4 - 6)        Allergies:  penicillins (Rash)      Ambulation/Activity Status: Ambulates several times daily with assistance.    Assessment/Plan:  83y Female status-post   TAVR         POD #2  - Case and plan discussed with CTU Intensivist and CT Surgeon - Dr. Chou/Thang/Theresa  - Continue CTU supportive care    - Continue DVT/GI prophylaxis  - Incentive Spirometry 10 times an hour  - Continue to advance physical activity as tolerated and continue PT/OT as directed  1. D/C Home today with OT

## 2023-11-17 NOTE — DISCHARGE NOTE NURSING/CASE MANAGEMENT/SOCIAL WORK - PATIENT PORTAL LINK FT
You can access the FollowMyHealth Patient Portal offered by [facility] by registering at the following website: [email or web address]. By joining [facility]’s FollowMyHealth portal, you will also be able to view your health information using other applications (apps) compatible with our system.

## 2023-11-17 NOTE — PROGRESS NOTE ADULT - NS ATTEND AMEND GEN_ALL_CORE FT
CT surgery attending note    Patient seen and examined on morning rounds as described    Postop day 2  Status post transcatheter aortic valve replacement    Doing very well  Hemodynamically stable  Her heart rhythm has stabilized  Neurologically intact  Ambulating comfortably  Groins are clean and dry without hematomas    Patient is elderly and frail    Overall she is making an excellent recovery  Discharge planning in progress    The patient, the family and the care team updated regarding the plan and progress

## 2023-11-20 ENCOUNTER — NON-APPOINTMENT (OUTPATIENT)
Age: 83
End: 2023-11-20

## 2023-11-20 RX ORDER — METOPROLOL TARTRATE 50 MG/1
50 TABLET, FILM COATED ORAL
Qty: 60 | Refills: 3 | Status: DISCONTINUED | COMMUNITY
Start: 2022-10-12 | End: 2023-11-20

## 2023-11-21 ENCOUNTER — APPOINTMENT (OUTPATIENT)
Dept: CARE COORDINATION | Facility: HOME HEALTH | Age: 83
End: 2023-11-21

## 2023-11-21 ENCOUNTER — NON-APPOINTMENT (OUTPATIENT)
Age: 83
End: 2023-11-21

## 2023-11-21 VITALS
DIASTOLIC BLOOD PRESSURE: 72 MMHG | SYSTOLIC BLOOD PRESSURE: 120 MMHG | HEART RATE: 78 BPM | RESPIRATION RATE: 12 BRPM | OXYGEN SATURATION: 95 %

## 2023-11-22 ENCOUNTER — APPOINTMENT (OUTPATIENT)
Dept: CARDIOLOGY | Facility: CLINIC | Age: 83
End: 2023-11-22
Payer: MEDICARE

## 2023-11-22 VITALS
RESPIRATION RATE: 12 BRPM | HEIGHT: 63 IN | SYSTOLIC BLOOD PRESSURE: 111 MMHG | TEMPERATURE: 98.2 F | BODY MASS INDEX: 34.38 KG/M2 | HEART RATE: 82 BPM | OXYGEN SATURATION: 96 % | WEIGHT: 194 LBS | DIASTOLIC BLOOD PRESSURE: 68 MMHG

## 2023-11-22 PROCEDURE — 99214 OFFICE O/P EST MOD 30 MIN: CPT | Mod: 24

## 2023-11-24 DIAGNOSIS — I48.91 UNSPECIFIED ATRIAL FIBRILLATION: ICD-10-CM

## 2023-11-24 DIAGNOSIS — I35.0 NONRHEUMATIC AORTIC (VALVE) STENOSIS: ICD-10-CM

## 2023-11-24 DIAGNOSIS — I25.10 ATHEROSCLEROTIC HEART DISEASE OF NATIVE CORONARY ARTERY WITHOUT ANGINA PECTORIS: ICD-10-CM

## 2023-11-24 DIAGNOSIS — I11.0 HYPERTENSIVE HEART DISEASE WITH HEART FAILURE: ICD-10-CM

## 2023-11-24 DIAGNOSIS — Z95.5 PRESENCE OF CORONARY ANGIOPLASTY IMPLANT AND GRAFT: ICD-10-CM

## 2023-11-24 DIAGNOSIS — I50.32 CHRONIC DIASTOLIC (CONGESTIVE) HEART FAILURE: ICD-10-CM

## 2023-11-24 DIAGNOSIS — Z88.0 ALLERGY STATUS TO PENICILLIN: ICD-10-CM

## 2023-11-24 DIAGNOSIS — Z00.6 ENCOUNTER FOR EXAMINATION FOR NORMAL COMPARISON AND CONTROL IN CLINICAL RESEARCH PROGRAM: ICD-10-CM

## 2023-12-13 LAB
HCT VFR BLD CALC: 36 %
HGB BLD-MCNC: 10.2 G/DL
MCHC RBC-ENTMCNC: 28.3 G/DL
MCHC RBC-ENTMCNC: 29.1 PG
MCV RBC AUTO: 102.6 FL
PLATELET # BLD AUTO: 249 K/UL
PMV BLD: 11.4 FL
RBC # BLD: 3.51 M/UL
RBC # FLD: 21.6 %
WBC # FLD AUTO: 5.44 K/UL

## 2023-12-14 LAB
ALBUMIN SERPL ELPH-MCNC: 4 G/DL
ALP BLD-CCNC: 108 U/L
ALT SERPL-CCNC: 8 U/L
ANION GAP SERPL CALC-SCNC: 11 MMOL/L
AST SERPL-CCNC: 17 U/L
BILIRUB SERPL-MCNC: 0.5 MG/DL
BUN SERPL-MCNC: 16 MG/DL
CALCIUM SERPL-MCNC: 9.7 MG/DL
CHLORIDE SERPL-SCNC: 103 MMOL/L
CO2 SERPL-SCNC: 27 MMOL/L
CREAT SERPL-MCNC: 0.8 MG/DL
EGFR: 73 ML/MIN/1.73M2
GLUCOSE SERPL-MCNC: 95 MG/DL
POTASSIUM SERPL-SCNC: 5.5 MMOL/L
PROT SERPL-MCNC: 6.8 G/DL
SODIUM SERPL-SCNC: 141 MMOL/L

## 2023-12-26 ENCOUNTER — APPOINTMENT (OUTPATIENT)
Dept: HEMATOLOGY ONCOLOGY | Facility: CLINIC | Age: 83
End: 2023-12-26
Payer: MEDICARE

## 2023-12-26 ENCOUNTER — OUTPATIENT (OUTPATIENT)
Dept: OUTPATIENT SERVICES | Facility: HOSPITAL | Age: 83
LOS: 1 days | End: 2023-12-26
Payer: MEDICARE

## 2023-12-26 ENCOUNTER — LABORATORY RESULT (OUTPATIENT)
Age: 83
End: 2023-12-26

## 2023-12-26 VITALS
DIASTOLIC BLOOD PRESSURE: 75 MMHG | BODY MASS INDEX: 35.44 KG/M2 | HEIGHT: 63 IN | HEART RATE: 104 BPM | WEIGHT: 200 LBS | TEMPERATURE: 97.3 F | RESPIRATION RATE: 12 BRPM | SYSTOLIC BLOOD PRESSURE: 130 MMHG

## 2023-12-26 DIAGNOSIS — D64.9 ANEMIA, UNSPECIFIED: ICD-10-CM

## 2023-12-26 DIAGNOSIS — R53.83 OTHER FATIGUE: ICD-10-CM

## 2023-12-26 DIAGNOSIS — Z95.5 PRESENCE OF CORONARY ANGIOPLASTY IMPLANT AND GRAFT: Chronic | ICD-10-CM

## 2023-12-26 DIAGNOSIS — Z95.818 PRESENCE OF OTHER CARDIAC IMPLANTS AND GRAFTS: Chronic | ICD-10-CM

## 2023-12-26 DIAGNOSIS — Z90.49 ACQUIRED ABSENCE OF OTHER SPECIFIED PARTS OF DIGESTIVE TRACT: Chronic | ICD-10-CM

## 2023-12-26 DIAGNOSIS — Z84.1 FAMILY HISTORY OF DISORDERS OF KIDNEY AND URETER: ICD-10-CM

## 2023-12-26 PROCEDURE — 83540 ASSAY OF IRON: CPT

## 2023-12-26 PROCEDURE — 82728 ASSAY OF FERRITIN: CPT

## 2023-12-26 PROCEDURE — 84165 PROTEIN E-PHORESIS SERUM: CPT

## 2023-12-26 PROCEDURE — 84443 ASSAY THYROID STIM HORMONE: CPT

## 2023-12-26 PROCEDURE — 83010 ASSAY OF HAPTOGLOBIN QUANT: CPT

## 2023-12-26 PROCEDURE — 99204 OFFICE O/P NEW MOD 45 MIN: CPT

## 2023-12-26 PROCEDURE — 82607 VITAMIN B-12: CPT

## 2023-12-26 PROCEDURE — 86880 COOMBS TEST DIRECT: CPT

## 2023-12-26 PROCEDURE — 86334 IMMUNOFIX E-PHORESIS SERUM: CPT

## 2023-12-26 PROCEDURE — 83921 ORGANIC ACID SINGLE QUANT: CPT

## 2023-12-26 PROCEDURE — 84155 ASSAY OF PROTEIN SERUM: CPT

## 2023-12-26 PROCEDURE — 82746 ASSAY OF FOLIC ACID SERUM: CPT

## 2023-12-26 PROCEDURE — 85027 COMPLETE CBC AUTOMATED: CPT

## 2023-12-26 PROCEDURE — 83550 IRON BINDING TEST: CPT

## 2023-12-26 NOTE — HISTORY OF PRESENT ILLNESS
[de-identified] : This is a 82 yo female referred by interventional cardiologist, Dr. Spaulding for anemia workup. Here with her daughter, Britney ([phone]) She has PMH of GIB, Afib (off anticoagulation due to GIB), s/p Watchman device insertion, HTN, HLD, diverticulosis, non-erosive gastritis, aortic stenosis and hypothyroidism. S/P TAVR She continues to feel fatigued. Latest CBC on 12/13/23 showed Wbc=5.44, Hgb=10.2, Hct=36.0, Ast=158V h/o blood transfusion last year.  Denies chest pain, SOB, palpitations. Stent procedure 10/2023 Valve procedure 11/2023 Endoscopy 6/23 normal Colonoscopy 10/22 normal

## 2023-12-26 NOTE — ASSESSMENT
[FreeTextEntry1] : This is a 82 yo female referred by interventional cardiologist, Dr. Spaulding for anemia workup. Here with her daughter, Britney ([phone]) She has PMH of GIB, Afib (off anticoagulation due to GIB), s/p Watchman device insertion, HTN, HLD, diverticulosis, non-erosive gastritis, aortic stenosis and hypothyroidism. S/P TAVR She continues to feel fatigued. Latest CBC on 12/13/23 showed Wbc=5.44, Hgb=10.2, Hct=36.0, Kbm=978K h/o blood transfusion last year.  Denies chest pain, SOB, palpitations. Stent procedure 10/2023 Valve procedure 11/2023 Endoscopy 6/23 normal Colonoscopy 10/22 normal  Impression:  Anemia, unspecified type  Plan:  -Previous chart/pertinent labs reviewed. -Bloodwork ordered: Anemia panel- CBC, iron studies, ferritin, Folate, Haptoglobin, Direct Elin, Serum Immunofixation, SPEP, MMA, Vitamin B12 -I will call pt's daughter for lab results. -Follow up with Dr. Floyd in ~ 1 month.

## 2023-12-27 ENCOUNTER — APPOINTMENT (OUTPATIENT)
Dept: CARDIOTHORACIC SURGERY | Facility: CLINIC | Age: 83
End: 2023-12-27
Payer: MEDICARE

## 2023-12-27 ENCOUNTER — APPOINTMENT (OUTPATIENT)
Dept: HEMATOLOGY ONCOLOGY | Facility: CLINIC | Age: 83
End: 2023-12-27

## 2023-12-27 ENCOUNTER — APPOINTMENT (OUTPATIENT)
Dept: CARDIOLOGY | Facility: CLINIC | Age: 83
End: 2023-12-27
Payer: MEDICARE

## 2023-12-27 VITALS
TEMPERATURE: 98.5 F | SYSTOLIC BLOOD PRESSURE: 129 MMHG | WEIGHT: 200 LBS | RESPIRATION RATE: 12 BRPM | HEIGHT: 63 IN | DIASTOLIC BLOOD PRESSURE: 84 MMHG | BODY MASS INDEX: 35.44 KG/M2 | OXYGEN SATURATION: 96 % | HEART RATE: 84 BPM

## 2023-12-27 DIAGNOSIS — D64.9 ANEMIA, UNSPECIFIED: ICD-10-CM

## 2023-12-27 PROCEDURE — 93306 TTE W/DOPPLER COMPLETE: CPT

## 2023-12-27 PROCEDURE — 99214 OFFICE O/P EST MOD 30 MIN: CPT

## 2023-12-28 NOTE — ASSESSMENT
[FreeTextEntry1] : Plan: #Aortic Stenosis Independant Echo review, Normal Functioning Bioprosthetic Valve Echo repeat for TAVR 1 year will routine follow up  with cardiology   #Mitral Regurgitation Moderate to Severe by eval. Discussed with Dr. Fletcher Will review serial echo May potentially need clip in near future Will call patient with plan  #CAD Recent Stenting Continue DAPT F/U with General Cardiology  #Anemia Referred to Hematology for evaluation and treatment

## 2023-12-28 NOTE — PHYSICAL EXAM
[facility] [place], [address]  [address]  [facility] [address]  [phone]      Rolando Goldman :1966 MRN:[id number]    2023 Time Session Began: 7:25 AM   Time Session Ended: 8:23 AM     This visit was performed via live interactive two-way Video visit with patient's verbal consent.   Clinician Location:Home  Patient Location: Home.  Verified patient identity:  [x] Yes    Session Type: Therapy 53+ minutes (68639)  Others Present: Pt only     Suicide/Homicide/Violence Ideation: No  If Yes, explain: N/a    Need for Community Resources Assessed: Yes  Resources Needed: No  If Yes, what resources: N/a    Current Outpatient Medications   Medication Sig   • ziprasidone (GEODON) 20 MG capsule Take 1 capsule by mouth in the morning and 1 capsule in the evening. Take with meals.   • OXcarbazepine (TRILEPTAL) 300 MG tablet TAKE 1 TABLET BY MOUTH IN THE MORNING AND IN THE EVENING   • Omega-3 Fatty Acids (Fish Oil) 1000 MG capsule 1,000 mg.   • acyclovir (ZOVIRAX) 800 MG tablet 400 mg.   • atorvastatin (LIPITOR) 80 MG tablet 80 mg.   • diclofenac (VOLTAREN) 75 MG EC tablet 75 mg.   • ketotifen (ZADITOR) 0.025 % ophthalmic solution PLACE 1 DROP IN EACH EYE TWICE A DAY AS NEEDED   • metformin (GLUCOPHAGE) 1000 MG tablet 1,000 mg.   • Semaglutide-Weight Management (WEGOVY) 0.5 MG/0.5ML Solution Auto-injector INJECT 0.25MG/0.5ML UNDER THE SKIN EVERY WEEK FOR WEIGHT LOSS   • sildenafil (VIAGRA) 100 MG tablet 100 mg.   • doxepin (SINEquan) 10 MG capsule Take 1 capsule by mouth nightly.   • busPIRone (BUSPAR) 30 MG tablet Take 1 tablet by mouth in the morning and 1 tablet in the evening.   • prazosin (MINIPRESS) 2 MG capsule Take 1 capsule by mouth nightly.   • gabapentin (NEURONTIN) 100 MG capsule Take 1 capsule by mouth in the morning and 1 capsule at noon and 1 capsule in the evening.   • omeprazole (PrilOSEC) 20 MG capsule Take 1 capsule by mouth daily.   • sertraline  (ZOLOFT) 100 MG tablet Take 2 tablets by mouth daily.   • acyclovir (ZOVIRAX) 400 MG tablet Take 1 tablet by mouth in the morning and 1 tablet at noon and 1 tablet in the evening.   • aspirin (Aspirin 81) 81 MG chewable tablet Chew 1 tablet by mouth in the morning and 1 tablet in the evening.   • docusate sodium-sennosides (SENOKOT S) 50-8.6 MG per tablet Take 1 tablet by mouth daily.   • Multiple Vitamins-Minerals (vitamin - therapeutic multivitamins w/minerals) tablet    • Eszopiclone (Lunesta) 3 MG tablet Take 1 tablet by mouth nightly.   • amoxicillin (AMOXIL) 500 MG capsule Take 4 capsules 1 hour prior to dental procedure.   • metFORMIN (GLUCOPHAGE) 500 MG tablet Take 1,000 mg by mouth in the morning and 1,000 mg in the evening. Take with meals.   • Omega-3 Fatty Acids (Fish Oil) 1000 MG capsule Take 1,000 mg by mouth daily.   • lisinopril-hydroCHLOROthiazide (ZESTORETIC) 20-25 MG per tablet Take 1 tablet by mouth daily.   • ciclopirox (PENLAC) 8 % topical solution Apply externally to the affected area nightly (Patient taking differently: Apply externally to the affected area nightly- scalp- PRN)   • magnesium gluconate (MAGONATE) 500 MG tablet Take 1 tablet by mouth daily.   • zinc sulfate (ZINCATE) 220 (50 Zn) MG capsule Take by mouth daily.   • sildenafil (VIAGRA) 100 MG tablet TAKE ONE TABLET BY MOUTH AS NEEDED ON EMPTY STOMACH, 1 HOUR PRIOR TO SEX; NOT TO EXCEED 1 DOSE IN 24 HOURS ** 90 DAY SUPPLY, NO EARLY REFILLS **   • cholecalciferol (VITAMIN D3) 1000 UNITS tablet Take 2,000 Units by mouth daily.    • fluticasone (FLONASE) 50 MCG/ACT nasal spray Spray 1 spray in each nostril daily.     No current facility-administered medications for this visit.       Change in Medication(s) Reported: No    If Yes, explain: N/a    Patient/Family Education Provided: Yes  Patient/Family Displays Understanding: Yes  If No, explain: N/a    Chief complaint in patient's own words: \"anxiety and sleep.\"    Progress Note  containing chief complaint and symptoms/problems related to the complaint:    Data: Patient reported that he has been more on edge and irritable and feels more anxious than he had been. Stated that his sleep has been very poor and his mind keeps going for hours. Patient stated \"I will feel very tired and lay in bed and before I know it will be 3 AM. Patient stated that he will try using his sound machines for sleep tonight instead of putting tv on. Discussed some struggles with wanting to do things in the morning and then not having the energy to get up early before work. Stated that he would like to have more time in the mornings instead of just rushing for work. Stated that his thoughts at night are all random thoughts and not really anxiety based when being kept up at night. Patient stated that on days where anxiety is much higher he has no problem falling asleep at night. Discussed patterns with laying in bed and body feeling sore from the day and having thoughts of how much pain he is in. Patient stated \"The racing thoughts are there before bed, but not during the day, but the anxiety gets me during the day.\" Discussed pressure he puts on himself to fall asleep early enough to decrease sleep anxiety and his struggles with falling asleep. Discussed struggles with wanting things to be more perfect and sleep 7-8 hours like people are supposed to. Patient voiced lack of motivation with cleaning garage and basement and having feelings of being overwhelmed. Discussed avoidance and putting things off. Discussed doing very well with things that are structured. Patient stated \"When I can get into a routine and get structured and then just make things work.      Action of the provider: Patient was provided with support. Patient's report of anxiety and sleep were processed and reframed to build insight. Cognitions shared by patient were acknowledged and exploration was encouraged. Provider recommended patient work on  [Sclera] : the sclera and conjunctiva were normal healthy thinking and processing skills. Worked through anxiety surrounding sleep and discussed ideas to decrease pressure surrounding sleep. Intervention: Cognitive Behavioral     Response of patient: Rolando was alert and oriented x4. Patient presented motivated. Patient presented as calm and cooperative. Mood appeared anxious with congruent affect. Eye contact was appropriate. Speech was normal in rate, tone, and volume. Motor activity was unremarkable. Thought process was future oriented and goal directed. Patient denied any suicidal ideation, homicidal ideation, or self-harm ideation.      Plan: Rolando  will return in 6 weeks or sooner if needed. Patient will practice healthy thinking and processing skills discussed in session.    Diagnosis: Major Depression Recurrent : 2 Moderate  PTSD Chronic Condition    Treatment Plan:  Unchanged     Discharge Plan: Strategies Discussed to Maintain Gains     Next Appointment: 9/15/2023  Warren Parham LCSW   [PERRL With Normal Accommodation] : pupils were equal in size, round, and reactive to light [Extraocular Movements] : extraocular movements were intact [Neck Appearance] : the appearance of the neck was normal [Neck Cervical Mass (___cm)] : no neck mass was observed [Jugular Venous Distention Increased] : there was no jugular-venous distention [Thyroid Diffuse Enlargement] : the thyroid was not enlarged [Thyroid Nodule] : there were no palpable thyroid nodules [Auscultation Breath Sounds / Voice Sounds] : lungs were clear to auscultation bilaterally [Normal Rate] : normal [IV] : a grade 4 [Bowel Sounds] : normal bowel sounds [Abdomen Soft] : soft [Abdomen Tenderness] : non-tender [Abdomen Mass (___ Cm)] : no abdominal mass palpated [Abnormal Walk] : normal gait [Nail Clubbing] : no clubbing  or cyanosis of the fingernails [Musculoskeletal - Swelling] : no joint swelling seen [Motor Tone] : muscle strength and tone were normal [Skin Color & Pigmentation] : normal skin color and pigmentation [Skin Turgor] : normal skin turgor [] : no rash [Deep Tendon Reflexes (DTR)] : deep tendon reflexes were 2+ and symmetric [Sensation] : the sensory exam was normal to light touch and pinprick [No Focal Deficits] : no focal deficits [Oriented To Time, Place, And Person] : oriented to person, place, and time [Impaired Insight] : insight and judgment were intact [Affect] : the affect was normal [III] : a grade 3

## 2023-12-28 NOTE — HISTORY OF PRESENT ILLNESS
[FreeTextEntry1] : Mrs. Mcgee is a 83-year-old woman ex smoker with past medical history of GIB, A fib (off anticoagulation due to GIB), s/p Watchman device insertion, HTN, HLD, diverticulosis, non-erosive gastritis, aortic stenosis and hypothyroidism presented early October for elective TAVR due to progressive dyspnea and fatigue related to AS. On 10/11/23, she went to the cath lab for TAVR. Pre-op TTE though, revealed decreased EF from previous study in June. June cardiac cath did not reveal significant CAD, Decision was made to shoot coronaries prior to inserting valve. She was staged for definitive TAVR and arrives today for her one month visit.  [Hypertension] : Hypertension

## 2023-12-29 LAB
DIRECT COOMBS: NORMAL
FERRITIN SERPL-MCNC: 46 NG/ML
FOLATE SERPL-MCNC: 19.3 NG/ML
HAPTOGLOB SERPL-MCNC: 61 MG/DL
HCT VFR BLD CALC: 34.4 %
HGB BLD-MCNC: 10.7 G/DL
IRON SATN MFR SERPL: 82 %
IRON SERPL-MCNC: 335 UG/DL
MCHC RBC-ENTMCNC: 30 PG
MCHC RBC-ENTMCNC: 31.1 G/DL
MCV RBC AUTO: 96.4 FL
PLATELET # BLD AUTO: 215 K/UL
PMV BLD: 11 FL
RBC # BLD: 3.57 M/UL
RBC # FLD: 17.4 %
TIBC SERPL-MCNC: 407 UG/DL
TSH SERPL-ACNC: 0.31 UIU/ML
UIBC SERPL-MCNC: 72 UG/DL
VIT B12 SERPL-MCNC: 540 PG/ML
WBC # FLD AUTO: 5.45 K/UL

## 2024-01-05 LAB
ALBUMIN MFR SERPL ELPH: 54.7 %
ALBUMIN SERPL-MCNC: 3.8 G/DL
ALBUMIN/GLOB SERPL: 1.2 RATIO
ALPHA1 GLOB MFR SERPL ELPH: 5.1 %
ALPHA1 GLOB SERPL ELPH-MCNC: 0.4 G/DL
ALPHA2 GLOB MFR SERPL ELPH: 8 %
ALPHA2 GLOB SERPL ELPH-MCNC: 0.6 G/DL
B-GLOBULIN MFR SERPL ELPH: 12.4 %
B-GLOBULIN SERPL ELPH-MCNC: 0.9 G/DL
GAMMA GLOB FLD ELPH-MCNC: 1.4 G/DL
GAMMA GLOB MFR SERPL ELPH: 19.8 %
INR PPP: 1.19 RATIO
INTERPRETATION SERPL IEP-IMP: NORMAL
M PROTEIN MFR SERPL ELPH: 3 %
M PROTEIN SPEC IFE-MCNC: NORMAL
METHYLMALONATE SERPL-SCNC: 373 NMOL/L
MONOCLON BAND OBS SERPL: 0.2 G/DL
PROT SERPL-MCNC: 7 G/DL
PROT SERPL-MCNC: 7 G/DL
PT BLD: 13.6 SEC

## 2024-01-08 RX ORDER — MULTIVIT-MIN/FERROUS GLUCONATE 9 MG/15 ML
0 LIQUID (ML) ORAL
Refills: 0 | DISCHARGE

## 2024-01-09 ENCOUNTER — OUTPATIENT (OUTPATIENT)
Dept: OUTPATIENT SERVICES | Facility: HOSPITAL | Age: 84
LOS: 1 days | End: 2024-01-09
Payer: MEDICARE

## 2024-01-09 DIAGNOSIS — Z95.818 PRESENCE OF OTHER CARDIAC IMPLANTS AND GRAFTS: Chronic | ICD-10-CM

## 2024-01-09 DIAGNOSIS — I35.0 NONRHEUMATIC AORTIC (VALVE) STENOSIS: ICD-10-CM

## 2024-01-09 DIAGNOSIS — Z95.5 PRESENCE OF CORONARY ANGIOPLASTY IMPLANT AND GRAFT: Chronic | ICD-10-CM

## 2024-01-09 DIAGNOSIS — Z90.49 ACQUIRED ABSENCE OF OTHER SPECIFIED PARTS OF DIGESTIVE TRACT: Chronic | ICD-10-CM

## 2024-01-09 PROCEDURE — 93325 DOPPLER ECHO COLOR FLOW MAPG: CPT | Mod: 26

## 2024-01-09 PROCEDURE — 93320 DOPPLER ECHO COMPLETE: CPT | Mod: 26

## 2024-01-09 PROCEDURE — 93312 ECHO TRANSESOPHAGEAL: CPT

## 2024-01-09 PROCEDURE — 93312 ECHO TRANSESOPHAGEAL: CPT | Mod: 26,XU

## 2024-01-09 PROCEDURE — 93325 DOPPLER ECHO COLOR FLOW MAPG: CPT

## 2024-01-09 PROCEDURE — 93320 DOPPLER ECHO COMPLETE: CPT

## 2024-01-09 NOTE — PRE-ANESTHESIA EVALUATION ADULT - NSRADCARDRESULTSFT_GEN_ALL_CORE
TTE 11/16/23  Summary:   1. Normal global left ventricular systolic function with a biplane EF of   56%. The left ventricular diastolic function could not be assessed in   this study due to atrial fibrillation. Mild concentric   2. Normal right ventricular size and function.   3. Moderately enlarged left atrium.   4. Mildly enlarged right atrium.   5. 29 mm Medtronic Evolute Pro + in the Aortic position. Appropriately   positioned well expanded valve. Peak transvalvular velocity is 2.1m/s.   Peak/mean transvalvular pressure gradient is 18/10 mmHg. Aortic valve   area by VTI 1.8 cm2. No evidence of significant valvular or paravalvular   regurgitation. Findings are within normal range for the above-mentioned   valve.   6. Mitral annular calcification.   7. Mild to moderate mitral valve regurgitation.   8. Mild-moderate tricuspid regurgitation.   9. Mild pulmonary hypertension (PASP = 48mmHg).  10. No pericardial effusion.

## 2024-01-09 NOTE — H&P CARDIOLOGY - HISTORY OF PRESENT ILLNESS
84 YO F ex-smoker (quit 40 years ago) with PMHx of Leo (off anticoagulation due to GIB, s/p Watchman), HTN, HLD, diverticulosis, non-erosive gastritis, aortic stenosis s/p recent BAV, CAD s/p recent PCI to dLM/pLAD, hypothyroidism presented for BALDEMAR for MR and TR evaluation    REVIEW OF SYSTEMS:  CONSTITUTIONAL: Weakness  EYES/ENT: No visual changes;  No vertigo or throat pain   NECK: No pain or stiffness  RESPIRATORY: No cough, wheezing, hemoptysis; SEE HPI  CARDIOVASCULAR: SEE HPI  GASTROINTESTINAL: No abdominal or epigastric pain. No nausea, vomiting, or hematemesis; No diarrhea or constipation. No melena or hematochezia.  GENITOURINARY: No dysuria, frequency or hematuria  NEUROLOGICAL: No numbness or weakness  SKIN: No itching, rashes      PHYSICAL EXAM:  T(C): --  HR: --  BP: --  RR: --  SpO2: --  GENERAL: NAD  HEAD:  Atraumatic, Normocephalic  EYES: conjunctiva and sclera clear  NECK: No JVD  CHEST/LUNG: Dec BS  HEART: Regular rate and rhythm; No murmurs  ABDOMEN: Soft, Nontender, Nondistended; Bowel sounds present  EXTREMITIES:  2+ Peripheral Pulses, No clubbing, cyanosis, or edema  NEUROLOGY:  A&Ox3, appropriate  SKIN: No rashes or lesions           82 YO F ex-smoker (quit 40 years ago) with PMHx of Leo (off anticoagulation due to GIB, s/p Watchman), HTN, HLD, diverticulosis, non-erosive gastritis, aortic stenosis s/p recent BAV, CAD s/p recent PCI to dLM/pLAD, hypothyroidism presented for BALDEMAR for MR and TR evaluation    REVIEW OF SYSTEMS:  CONSTITUTIONAL: Weakness  EYES/ENT: No visual changes;  No vertigo or throat pain   NECK: No pain or stiffness  RESPIRATORY: No cough, wheezing, hemoptysis; SEE HPI  CARDIOVASCULAR: SEE HPI  GASTROINTESTINAL: No abdominal or epigastric pain. No nausea, vomiting, or hematemesis; No diarrhea or constipation. No melena or hematochezia.  GENITOURINARY: No dysuria, frequency or hematuria  NEUROLOGICAL: No numbness or weakness  SKIN: No itching, rashes      PHYSICAL EXAM:  T(C): --  HR: --  BP: --  RR: --  SpO2: --  GENERAL: NAD  HEAD:  Atraumatic, Normocephalic  EYES: conjunctiva and sclera clear  NECK: No JVD  CHEST/LUNG: Dec BS  HEART: Regular rate and rhythm; No murmurs  ABDOMEN: Soft, Nontender, Nondistended; Bowel sounds present  EXTREMITIES:  2+ Peripheral Pulses, No clubbing, cyanosis, or edema  NEUROLOGY:  A&Ox3, appropriate  SKIN: No rashes or lesions           84 YO F ex-smoker (quit 40 years ago) with PMHx of Leo (off anticoagulation due to GIB, s/p Watchman), HTN, HLD, diverticulosis, non-erosive gastritis, aortic stenosis s/p recent BAV, CAD s/p recent PCI to dLM/pLAD, hypothyroidism presented for BALDEMAR for MR and TR evaluation      REVIEW OF SYSTEMS:  CONSTITUTIONAL: Weakness  EYES/ENT: No visual changes;  No vertigo or throat pain   NECK: No pain or stiffness  RESPIRATORY: No cough, wheezing, hemoptysis; SEE HPI  CARDIOVASCULAR: SEE HPI  GASTROINTESTINAL: No abdominal or epigastric pain. No nausea, vomiting, or hematemesis; No diarrhea or constipation. No melena or hematochezia.  GENITOURINARY: No dysuria, frequency or hematuria  NEUROLOGICAL: No numbness or weakness  SKIN: No itching, rashes      GENERAL: NAD  HEAD:  Atraumatic, Normocephalic  EYES: conjunctiva and sclera clear  NECK: No JVD  CHEST/LUNG: Dec BS  HEART: Regular rate and rhythm; No murmurs  ABDOMEN: Soft, Nontender, Nondistended  EXTREMITIES:  2+ Peripheral Pulses, No clubbing, cyanosis, or edema  NEUROLOGY:  A&Ox3, appropriate  SKIN: No rashes or lesions   82 YO F ex-smoker (quit 40 years ago) with PMHx of Leo (off anticoagulation due to GIB, s/p Watchman), HTN, HLD, diverticulosis, non-erosive gastritis, aortic stenosis s/p recent BAV, CAD s/p recent PCI to dLM/pLAD, hypothyroidism presented for BALDEMAR for MR and TR evaluation      REVIEW OF SYSTEMS:  CONSTITUTIONAL: Weakness  EYES/ENT: No visual changes;  No vertigo or throat pain   NECK: No pain or stiffness  RESPIRATORY: No cough, wheezing, hemoptysis; SEE HPI  CARDIOVASCULAR: SEE HPI  GASTROINTESTINAL: No abdominal or epigastric pain. No nausea, vomiting, or hematemesis; No diarrhea or constipation. No melena or hematochezia.  GENITOURINARY: No dysuria, frequency or hematuria  NEUROLOGICAL: No numbness or weakness  SKIN: No itching, rashes      GENERAL: NAD  HEAD:  Atraumatic, Normocephalic  EYES: conjunctiva and sclera clear  NECK: No JVD  CHEST/LUNG: Dec BS  HEART: Regular rate and rhythm; No murmurs  ABDOMEN: Soft, Nontender, Nondistended  EXTREMITIES:  2+ Peripheral Pulses, No clubbing, cyanosis, or edema  NEUROLOGY:  A&Ox3, appropriate  SKIN: No rashes or lesions

## 2024-01-09 NOTE — ASU PATIENT PROFILE, ADULT - FALL HARM RISK - UNIVERSAL INTERVENTIONS
Bed in lowest position, wheels locked, appropriate side rails in place/Call bell, personal items and telephone in reach/Instruct patient to call for assistance before getting out of bed or chair/Non-slip footwear when patient is out of bed/[place] to call system/Physically safe environment - no spills, clutter or unnecessary equipment/Purposeful Proactive Rounding/Room/bathroom lighting operational, light cord in reach Bed in lowest position, wheels locked, appropriate side rails in place/Call bell, personal items and telephone in reach/Instruct patient to call for assistance before getting out of bed or chair/Non-slip footwear when patient is out of bed/[place] to call system/Physically safe environment - no spills, clutter or unnecessary equipment/Purposeful Proactive Rounding/Room/bathroom lighting operational, light cord in reach

## 2024-01-09 NOTE — CHART NOTE - NSCHARTNOTEFT_GEN_A_CORE
POST OPERATIVE PROCEDURAL DOCUMENTATION  PRE-OP DIAGNOSIS: STRONG    POST-OP DIAGNOSIS: Mod MR. Severe TR    PROCEDURE: Transesophageal Echocardiogram    Primary Physician: Dr. Grant   Cardiology Fellow: Dr. Fuller    ANESTHESIA TYPE  [  ] General Anesthesia  [ x ] Conscious Sedation  [  ] Local/Regional    CONDITION  [  ] Critical  [  ] Serious  [  ] Fair  [ x ] Good    SPECIMENS REMOVED (IF APPLICABLE): N/A    IMPLANTS (IF APPLICABLE): None    ESTIMATED BLOOD LOSS: None    COMPLICATIONS: None    After risks and benefits of procedures were explained, informed consent was obtained and placed in chart.   The patient received topical anesthetic to the oropharynx with viscous lidocaine and benzocaine spray.  Refer to Anesthesia note for sedation details.  The BALDEMAR probe was passed into the esophagus without difficulty.  Transesophageal and transgastric images were obtained.  The BALDEMAR probe was removed without difficulty and examined.  There was no evidence for bleeding.  The patient tolerated the procedure well without any immediate BALDEMAR-related complications.      Preliminary Findings:  LA: Moderately enlarged  MEGHA: Left atrial appendage occlusion device (Amulet) noted with no mika-device leak or thrombus   LV: LVEF was estimated at 55-60%  MV: Mod MR  AV: Aortic valve bioprosthesis with no PVL  RA: Moderately enlarged  RV: Mod reduced RV function  TV: Severe TR  PV: No FL, no PS  IAS: No PFO or ASD. No R-> L shunt   Aorta: There was mild, non-mobile atheroma seen in the thoracic aorta.    DIAGNOSIS/IMPRESSION: Severe TR    Full report to follow    PLAN OF CARE:  [x] Discharge home when stable and fully awake.  [x] No eating or drinking for 1 hour  [x] No driving for 24 hours    Results of procedure/ plan of care discussed with patient/  in detail. POST OPERATIVE PROCEDURAL DOCUMENTATION  PRE-OP DIAGNOSIS: STRONG    POST-OP DIAGNOSIS: Mod MR. Severe TR    PROCEDURE: Transesophageal Echocardiogram    Primary Physician: Dr. Grant   Cardiology Fellow: Dr. Fuller    ANESTHESIA TYPE  [  ] General Anesthesia  [ x ] Conscious Sedation  [  ] Local/Regional    CONDITION  [  ] Critical  [  ] Serious  [  ] Fair  [ x ] Good    SPECIMENS REMOVED (IF APPLICABLE): N/A    IMPLANTS (IF APPLICABLE): None    ESTIMATED BLOOD LOSS: None    COMPLICATIONS: None    After risks and benefits of procedures were explained, informed consent was obtained and placed in chart.   The patient received topical anesthetic to the oropharynx with viscous lidocaine and benzocaine spray.  Refer to Anesthesia note for sedation details.  The BALDEMAR probe was passed into the esophagus without difficulty.  Transesophageal and transgastric images were obtained.  The BALDEMAR probe was removed without difficulty and examined.  There was no evidence for bleeding.  The patient tolerated the procedure well without any immediate BALDEMAR-related complications.      Preliminary Findings:  LA: Moderately enlarged  MEGHA: Left atrial appendage occlusion device (Amulet) noted with no mika-device leak or thrombus   LV: LVEF was estimated at 55-60%  MV: Mod MR  AV: Aortic valve bioprosthesis with no PVL  RA: Moderately enlarged  RV: Mod reduced RV function  TV: Severe TR  PV: No CT, no PS  IAS: No PFO or ASD. No R-> L shunt   Aorta: There was mild, non-mobile atheroma seen in the thoracic aorta.    DIAGNOSIS/IMPRESSION: Severe TR    Full report to follow    PLAN OF CARE:  [x] Discharge home when stable and fully awake.  [x] No eating or drinking for 1 hour  [x] No driving for 24 hours    Results of procedure/ plan of care discussed with patient/  in detail. POST OPERATIVE PROCEDURAL DOCUMENTATION    PRE-OP DIAGNOSIS:  Evaluate MR and TR    POST-OP DIAGNOSIS:  Mod MR.  Severe TR.    PROCEDURE: Transesophageal Echocardiogram    Primary Physician: Dr. Grant   Cardiology Fellow: Dr. Fuller    ANESTHESIA TYPE  [  ] General Anesthesia  [ x ] Conscious Sedation  [  ] Local/Regional    CONDITION  [  ] Critical  [  ] Serious  [  ] Fair  [ x ] Good    SPECIMENS REMOVED (IF APPLICABLE): N/A    IMPLANTS (IF APPLICABLE): None    ESTIMATED BLOOD LOSS: None    COMPLICATIONS: None    After risks and benefits of procedures were explained, informed consent was obtained and placed in chart.   The patient received topical anesthetic to the oropharynx with viscous lidocaine and benzocaine spray.  Refer to Anesthesia note for sedation details.  The BALDEMAR probe was passed into the esophagus without difficulty.  Transesophageal and transgastric images were obtained.  The BALDEMAR probe was removed without difficulty and examined.  There was no evidence for bleeding.  The patient tolerated the procedure well without any immediate BALDEMAR-related complications.      Preliminary Findings:  LVEF 50-55%  LAE/SHAY  MEGHA: Left atrial appendage occlusion device (Amulet) noted with no mika-device leak or thrombus  MV: Moderate MR  AV: TAVR, Trivial PVL  RV: Moderately reduced RV function  TV: Severe TR  No PFO or ASD  Aorta: There was mild, non-mobile atheroma seen in the thoracic aorta    Full report to follow.      PLAN OF CARE:  [x] Discharge home when stable and fully awake.  [x] No eating or drinking for 1 hour  [x] No driving for 24 hours    Results of procedure/plan of care discussed with patient/ in detail.

## 2024-01-10 DIAGNOSIS — I35.0 NONRHEUMATIC AORTIC (VALVE) STENOSIS: ICD-10-CM

## 2024-01-11 ENCOUNTER — APPOINTMENT (OUTPATIENT)
Dept: ELECTROPHYSIOLOGY | Facility: CLINIC | Age: 84
End: 2024-01-11
Payer: MEDICARE

## 2024-01-11 VITALS
TEMPERATURE: 98 F | HEIGHT: 63 IN | BODY MASS INDEX: 35.44 KG/M2 | HEART RATE: 96 BPM | DIASTOLIC BLOOD PRESSURE: 67 MMHG | SYSTOLIC BLOOD PRESSURE: 105 MMHG | WEIGHT: 200 LBS

## 2024-01-11 PROCEDURE — 93000 ELECTROCARDIOGRAM COMPLETE: CPT | Mod: 59

## 2024-01-11 PROCEDURE — 99214 OFFICE O/P EST MOD 30 MIN: CPT | Mod: 25

## 2024-01-11 NOTE — ADDENDUM
[FreeTextEntry1] : Marilyn HERNANDEZ assisted in documentation on 01/11/2024 acting as a scribe for Dr. Jose Alan.

## 2024-01-11 NOTE — ASSESSMENT
[FreeTextEntry1] : ## Persistent AF s/p Amulet ## Recurrent Severe GI Bleed ## Moderate to Severe Aortic Stenosis s/p TAVR  ## Moderate to Severe Mitral Regurgitation     - CHADSVASC score of 4+ (age, female, AS). Patient denies history of HTN. However, documented to have HTN. - s/p Amulet.  - Will continue Aspirin and Plavix for now. When they do the next BALDEMAR, they will evaluate for Amulet. If Amulet is doing fine and it is okay with structural heart from coronary stent, they can DC Plavix. Obviously, if the melena is worsening or Hgb is worsening, both can be stopped. Preferably temporarily.   - GI follow-up.    - Will increase Lasix by an addition 20 mg in the late afternoon for orthopnea symptoms. She will follow with structural heart for further management of mitral valve.    - Discussed with Dr. Spaulding.   - RTC in 6 months.

## 2024-01-11 NOTE — HISTORY OF PRESENT ILLNESS
[FreeTextEntry1] : Ms. Mcgee is an 82 year-old female with hx of moderate to severe aortic stenosis, persistent AF, HTN, dyslipidemia, GI bleed, is here for discussion of management of AF.   Accompanied by daughter.  Had an episode of AF. Started on anticoagulation. Had a GI bleed - endoscopy, colonoscopy, and capsule endoscopy done without any source identified. Restarted anticoagulation. Had a re-bleed.   + STRONG   No SOB at rest.   Followed by structural team. Last appointment cancelled due to emergency on physician's part.   6/22: Continues to feel STRONG. Agreeable for LAAO. 7/27/23: s/p melena. Recommended to go to ER- didn't want to go despite understanding. Stopped DAPT. Melena gone. Not on SAPT/DAPT. Undergoing w-up for TAVR, but not GI. 01/11/2024: S/p Amulet. S/p TAVR. Some difference in STRONG. Now she walks 4-5 steps without feeling out of breath. However, it is not a significant difference. She continues to have melena. She is on DAPT due to her stenting. She had a blood test done last week, but the results are not available. She also had MR where she is being prepped for mitral clip. She feels SOB when lying flat, so she sleeps on the recliner.         Denies chest pain, shortness of breath, palpitation, dizziness or LOC except noted above.  EKG (01/11/2024): AF @ 96, , QTc 472  EKG (07/27/23): AF EKG (04/06/2023): AF @ 83 bpm Echo (11/2022): Normal EF, moderate to severe AS, mild AR, mild MR. Cardio: Dr. Behuria Structural heart: Dr. Spaulding

## 2024-01-25 ENCOUNTER — APPOINTMENT (OUTPATIENT)
Dept: CARDIOLOGY | Facility: CLINIC | Age: 84
End: 2024-01-25
Payer: MEDICARE

## 2024-01-25 ENCOUNTER — APPOINTMENT (OUTPATIENT)
Dept: CARDIOTHORACIC SURGERY | Facility: CLINIC | Age: 84
End: 2024-01-25
Payer: MEDICARE

## 2024-01-25 ENCOUNTER — OUTPATIENT (OUTPATIENT)
Dept: OUTPATIENT SERVICES | Facility: HOSPITAL | Age: 84
LOS: 1 days | End: 2024-01-25
Payer: MEDICARE

## 2024-01-25 ENCOUNTER — RESULT REVIEW (OUTPATIENT)
Age: 84
End: 2024-01-25

## 2024-01-25 ENCOUNTER — APPOINTMENT (OUTPATIENT)
Dept: ELECTROPHYSIOLOGY | Facility: CLINIC | Age: 84
End: 2024-01-25

## 2024-01-25 VITALS
HEIGHT: 63 IN | HEART RATE: 87 BPM | TEMPERATURE: 98 F | DIASTOLIC BLOOD PRESSURE: 68 MMHG | RESPIRATION RATE: 12 BRPM | SYSTOLIC BLOOD PRESSURE: 110 MMHG | BODY MASS INDEX: 35.44 KG/M2 | WEIGHT: 200 LBS

## 2024-01-25 VITALS
RESPIRATION RATE: 16 BRPM | TEMPERATURE: 98 F | HEIGHT: 63 IN | HEART RATE: 76 BPM | WEIGHT: 199.96 LBS | OXYGEN SATURATION: 98 % | DIASTOLIC BLOOD PRESSURE: 70 MMHG | SYSTOLIC BLOOD PRESSURE: 110 MMHG

## 2024-01-25 DIAGNOSIS — Z90.49 ACQUIRED ABSENCE OF OTHER SPECIFIED PARTS OF DIGESTIVE TRACT: Chronic | ICD-10-CM

## 2024-01-25 DIAGNOSIS — R06.02 SHORTNESS OF BREATH: ICD-10-CM

## 2024-01-25 DIAGNOSIS — Z95.818 PRESENCE OF OTHER CARDIAC IMPLANTS AND GRAFTS: Chronic | ICD-10-CM

## 2024-01-25 DIAGNOSIS — Z01.818 ENCOUNTER FOR OTHER PREPROCEDURAL EXAMINATION: ICD-10-CM

## 2024-01-25 DIAGNOSIS — I34.0 NONRHEUMATIC MITRAL (VALVE) INSUFFICIENCY: ICD-10-CM

## 2024-01-25 DIAGNOSIS — Z95.5 PRESENCE OF CORONARY ANGIOPLASTY IMPLANT AND GRAFT: Chronic | ICD-10-CM

## 2024-01-25 LAB
A1C WITH ESTIMATED AVERAGE GLUCOSE RESULT: 5.2 % — SIGNIFICANT CHANGE UP (ref 4–5.6)
ALBUMIN SERPL ELPH-MCNC: 4 G/DL — SIGNIFICANT CHANGE UP (ref 3.5–5.2)
ALP SERPL-CCNC: 103 U/L — SIGNIFICANT CHANGE UP (ref 30–115)
ALT FLD-CCNC: 14 U/L — SIGNIFICANT CHANGE UP (ref 0–41)
ANION GAP SERPL CALC-SCNC: 11 MMOL/L — SIGNIFICANT CHANGE UP (ref 7–14)
APPEARANCE UR: CLEAR — SIGNIFICANT CHANGE UP
APTT BLD: 55.1 SEC — HIGH (ref 27–39.2)
AST SERPL-CCNC: 22 U/L — SIGNIFICANT CHANGE UP (ref 0–41)
BACTERIA # UR AUTO: NEGATIVE /HPF — SIGNIFICANT CHANGE UP
BASOPHILS # BLD AUTO: 0.04 K/UL — SIGNIFICANT CHANGE UP (ref 0–0.2)
BASOPHILS NFR BLD AUTO: 0.6 % — SIGNIFICANT CHANGE UP (ref 0–1)
BILIRUB SERPL-MCNC: 0.4 MG/DL — SIGNIFICANT CHANGE UP (ref 0.2–1.2)
BILIRUB UR-MCNC: NEGATIVE — SIGNIFICANT CHANGE UP
BLD GP AB SCN SERPL QL: SIGNIFICANT CHANGE UP
BUN SERPL-MCNC: 23 MG/DL — HIGH (ref 10–20)
CALCIUM SERPL-MCNC: 8.9 MG/DL — SIGNIFICANT CHANGE UP (ref 8.4–10.5)
CAST: 8 /LPF — HIGH (ref 0–4)
CHLORIDE SERPL-SCNC: 103 MMOL/L — SIGNIFICANT CHANGE UP (ref 98–110)
CO2 SERPL-SCNC: 29 MMOL/L — SIGNIFICANT CHANGE UP (ref 17–32)
COLOR SPEC: SIGNIFICANT CHANGE UP
CREAT SERPL-MCNC: 0.9 MG/DL — SIGNIFICANT CHANGE UP (ref 0.7–1.5)
DIFF PNL FLD: NEGATIVE — SIGNIFICANT CHANGE UP
EGFR: 63 ML/MIN/1.73M2 — SIGNIFICANT CHANGE UP
EOSINOPHIL # BLD AUTO: 0.11 K/UL — SIGNIFICANT CHANGE UP (ref 0–0.7)
EOSINOPHIL NFR BLD AUTO: 1.6 % — SIGNIFICANT CHANGE UP (ref 0–8)
ESTIMATED AVERAGE GLUCOSE: 103 MG/DL — SIGNIFICANT CHANGE UP (ref 68–114)
GLUCOSE SERPL-MCNC: 89 MG/DL — SIGNIFICANT CHANGE UP (ref 70–99)
GLUCOSE UR QL: NEGATIVE MG/DL — SIGNIFICANT CHANGE UP
HCT VFR BLD CALC: 36.4 % — LOW (ref 37–47)
HGB BLD-MCNC: 11 G/DL — LOW (ref 12–16)
IMM GRANULOCYTES NFR BLD AUTO: 0.4 % — HIGH (ref 0.1–0.3)
INR BLD: 1.23 RATIO — SIGNIFICANT CHANGE UP (ref 0.65–1.3)
KETONES UR-MCNC: NEGATIVE MG/DL — SIGNIFICANT CHANGE UP
LEUKOCYTE ESTERASE UR-ACNC: NEGATIVE — SIGNIFICANT CHANGE UP
LYMPHOCYTES # BLD AUTO: 1.04 K/UL — LOW (ref 1.2–3.4)
LYMPHOCYTES # BLD AUTO: 14.8 % — LOW (ref 20.5–51.1)
MCHC RBC-ENTMCNC: 30.2 G/DL — LOW (ref 32–37)
MCHC RBC-ENTMCNC: 30.8 PG — SIGNIFICANT CHANGE UP (ref 27–31)
MCV RBC AUTO: 102 FL — HIGH (ref 81–99)
MONOCYTES # BLD AUTO: 0.53 K/UL — SIGNIFICANT CHANGE UP (ref 0.1–0.6)
MONOCYTES NFR BLD AUTO: 7.5 % — SIGNIFICANT CHANGE UP (ref 1.7–9.3)
MRSA PCR RESULT.: NEGATIVE — SIGNIFICANT CHANGE UP
NEUTROPHILS # BLD AUTO: 5.3 K/UL — SIGNIFICANT CHANGE UP (ref 1.4–6.5)
NEUTROPHILS NFR BLD AUTO: 75.1 % — SIGNIFICANT CHANGE UP (ref 42.2–75.2)
NITRITE UR-MCNC: NEGATIVE — SIGNIFICANT CHANGE UP
NRBC # BLD: 0 /100 WBCS — SIGNIFICANT CHANGE UP (ref 0–0)
NT-PROBNP SERPL-SCNC: 4496 PG/ML — HIGH (ref 0–300)
PH UR: 5.5 — SIGNIFICANT CHANGE UP (ref 5–8)
PLATELET # BLD AUTO: 217 K/UL — SIGNIFICANT CHANGE UP (ref 130–400)
PMV BLD: 11.7 FL — HIGH (ref 7.4–10.4)
POTASSIUM SERPL-MCNC: 5.1 MMOL/L — HIGH (ref 3.5–5)
POTASSIUM SERPL-SCNC: 5.1 MMOL/L — HIGH (ref 3.5–5)
PROT SERPL-MCNC: 7.1 G/DL — SIGNIFICANT CHANGE UP (ref 6–8)
PROT UR-MCNC: 30 MG/DL
PROTHROM AB SERPL-ACNC: 14.1 SEC — HIGH (ref 9.95–12.87)
RBC # BLD: 3.57 M/UL — LOW (ref 4.2–5.4)
RBC # FLD: 16.2 % — HIGH (ref 11.5–14.5)
RBC CASTS # UR COMP ASSIST: 1 /HPF — SIGNIFICANT CHANGE UP (ref 0–4)
SODIUM SERPL-SCNC: 143 MMOL/L — SIGNIFICANT CHANGE UP (ref 135–146)
SP GR SPEC: 1.03 — SIGNIFICANT CHANGE UP (ref 1–1.03)
SQUAMOUS # UR AUTO: 1 /HPF — SIGNIFICANT CHANGE UP (ref 0–5)
UROBILINOGEN FLD QL: 1 MG/DL — SIGNIFICANT CHANGE UP (ref 0.2–1)
WBC # BLD: 7.05 K/UL — SIGNIFICANT CHANGE UP (ref 4.8–10.8)
WBC # FLD AUTO: 7.05 K/UL — SIGNIFICANT CHANGE UP (ref 4.8–10.8)
WBC UR QL: 1 /HPF — SIGNIFICANT CHANGE UP (ref 0–5)

## 2024-01-25 PROCEDURE — 36415 COLL VENOUS BLD VENIPUNCTURE: CPT

## 2024-01-25 PROCEDURE — 86850 RBC ANTIBODY SCREEN: CPT

## 2024-01-25 PROCEDURE — 81001 URINALYSIS AUTO W/SCOPE: CPT

## 2024-01-25 PROCEDURE — 99214 OFFICE O/P EST MOD 30 MIN: CPT

## 2024-01-25 PROCEDURE — 71046 X-RAY EXAM CHEST 2 VIEWS: CPT

## 2024-01-25 PROCEDURE — 85730 THROMBOPLASTIN TIME PARTIAL: CPT

## 2024-01-25 PROCEDURE — 87641 MR-STAPH DNA AMP PROBE: CPT

## 2024-01-25 PROCEDURE — 93005 ELECTROCARDIOGRAM TRACING: CPT

## 2024-01-25 PROCEDURE — 85610 PROTHROMBIN TIME: CPT

## 2024-01-25 PROCEDURE — 85025 COMPLETE CBC W/AUTO DIFF WBC: CPT

## 2024-01-25 PROCEDURE — 86901 BLOOD TYPING SEROLOGIC RH(D): CPT

## 2024-01-25 PROCEDURE — 80053 COMPREHEN METABOLIC PANEL: CPT

## 2024-01-25 PROCEDURE — 71046 X-RAY EXAM CHEST 2 VIEWS: CPT | Mod: 26

## 2024-01-25 PROCEDURE — 99214 OFFICE O/P EST MOD 30 MIN: CPT | Mod: 25

## 2024-01-25 PROCEDURE — 83880 ASSAY OF NATRIURETIC PEPTIDE: CPT

## 2024-01-25 PROCEDURE — 83036 HEMOGLOBIN GLYCOSYLATED A1C: CPT

## 2024-01-25 PROCEDURE — 93010 ELECTROCARDIOGRAM REPORT: CPT

## 2024-01-25 PROCEDURE — 87086 URINE CULTURE/COLONY COUNT: CPT

## 2024-01-25 PROCEDURE — 86900 BLOOD TYPING SEROLOGIC ABO: CPT

## 2024-01-25 PROCEDURE — 87640 STAPH A DNA AMP PROBE: CPT

## 2024-01-25 RX ORDER — LEVOTHYROXINE SODIUM 0.11 MG/1
112 TABLET ORAL DAILY
Qty: 30 | Refills: 1 | Status: ACTIVE | COMMUNITY
Start: 1900-01-01 | End: 1900-01-01

## 2024-01-25 NOTE — H&P PST ADULT - NSICDXPASTSURGICALHX_GEN_ALL_CORE_FT
PAST SURGICAL HISTORY:  Presence of Amulet left atrial appendage closure device     Presence of Watchman left atrial appendage closure device     S/P appendectomy     Stented coronary artery      PAST SURGICAL HISTORY:  Presence of Amulet left atrial appendage closure device     S/P appendectomy     Stented coronary artery

## 2024-01-25 NOTE — H&P PST ADULT - HISTORY OF PRESENT ILLNESS
84 Y/O FEMALE PT TO PAST WITH HX              PT NOW FOR SCHEDULED PROCEDURE. PT DENIES ANY CP SOB PALP COUGH DYSURIA FEVER URI. PT ABLE TO TOREY 1-2 FOS W/O SOB  Anesthesia Alert  NO--Difficult Airway  NO--History of neck surgery or radiation  NO--Limited ROM of neck  NO--History of Malignant hyperthermia  NO--Personal or family history of Pseudocholinesterase deficiency.  NO--Prior Anesthesia Complication  NO--Latex Allergy  NO--Loose teeth  NO--History of Rheumatoid Arthritis  NO--MARIETTA  NO--Bleeding risk  NO--Other_____   82 Y/O FEMALE PT TO PAST WITH HX MITRAL INSUFFICIENCY PT C/O  INCREASE FATIGUE SOB PAST YR PT NOW FOR SCHEDULED PROCEDURE ( . PT DENIES ANY CP SOB PALP COUGH DYSURIA FEVER URI.   Anesthesia Alert  NO--Difficult Airway  NO--History of neck surgery or radiation  NO--Limited ROM of neck  NO--History of Malignant hyperthermia  NO--Personal or family history of Pseudocholinesterase deficiency.  NO--Prior Anesthesia Complication  NO--Latex Allergy  NO--Loose teeth  NO--History of Rheumatoid Arthritis  NO--MARIETTA  NO--Bleeding risk  NO--Other_____   82 Y/O FEMALE PT TO PAST WITH HX MITRAL INSUFFICIENCY PT C/O  INCREASE FATIGUE SOB PAST YR PT NOW FOR SCHEDULED PROCEDURE ( . PT DENIES ANY CP SOB PALP COUGH DYSURIA FEVER URI.   Anesthesia Alert  NO--Difficult Airway  NO--History of neck surgery or radiation  NO--Limited ROM of neck  NO--History of Malignant hyperthermia  NO--Personal or family history of Pseudocholinesterase deficiency.  NO--Prior Anesthesia Complication  NO--Latex Allergy  NO--Loose teeth  NO--History of Rheumatoid Arthritis  NO--MARIETTA  NO--Bleeding risk  NO--Other_____  [employer] Activity Status Index (DASI) from [employer]  on 1/25/2024  ** All calculations should be rechecked by clinician prior to use **    RESULT SUMMARY:  13.45 points  The higher the score (maximum 58.2), the higher the functional status.    4.40 METs        INPUTS:  Take care of self —> 2.75 = Yes  Walk indoors —> 1.75 = Yes  Walk 1&ndash;2 blocks on level ground —> 2.75 = Yes  Climb a flight of stairs or walk up a hill —> 0 = No  Run a short distance —> 0 = No  Do light work around the house —> 2.7 = Yes  Do moderate work around the house —> 3.5 = Yes  Do heavy work around the house —> 0 = No  Do yardwork —> 0 = No  Have sexual relations —> 0 = No  Participate in moderate recreational activities —> 0 = No  Participate in strenuous sports —> 0 = No   82 Y/O FEMALE PT TO PAST WITH HX MITRAL INSUFFICIENCY PT C/O  INCREASE FATIGUE SOB PAST YR PT NOW FOR SCHEDULED PROCEDURE ( . PT DENIES ANY CP SOB PALP COUGH DYSURIA FEVER URI.   Anesthesia Alert  NO--Difficult Airway  NO--History of neck surgery or radiation  NO--Limited ROM of neck  NO--History of Malignant hyperthermia  NO--Personal or family history of Pseudocholinesterase deficiency.  NO--Prior Anesthesia Complication  NO--Latex Allergy  NO--Loose teeth  NO--History of Rheumatoid Arthritis  NO--MARIETTA  NO--Bleeding risk  NO--Other_____      RESULT SUMMARY:  13.45 points  The higher the score (maximum 58.2), the higher the functional status.    4.40 METs        INPUTS:  Take care of self —> 2.75 = Yes  Walk indoors —> 1.75 = Yes  Walk 1&ndash;2 blocks on level ground —> 2.75 = Yes  Climb a flight of stairs or walk up a hill —> 0 = No  Run a short distance —> 0 = No  Do light work around the house —> 2.7 = Yes  Do moderate work around the house —> 3.5 = Yes  Do heavy work around the house —> 0 = No  Do yardwork —> 0 = No  Have sexual relations —> 0 = No  Participate in moderate recreational activities —> 0 = No  Participate in strenuous sports —> 0 = No

## 2024-01-26 VITALS
DIASTOLIC BLOOD PRESSURE: 68 MMHG | BODY MASS INDEX: 35.44 KG/M2 | TEMPERATURE: 98 F | OXYGEN SATURATION: 98 % | SYSTOLIC BLOOD PRESSURE: 110 MMHG | RESPIRATION RATE: 12 BRPM | WEIGHT: 200 LBS | HEART RATE: 87 BPM | HEIGHT: 63 IN

## 2024-01-26 DIAGNOSIS — Z01.818 ENCOUNTER FOR OTHER PREPROCEDURAL EXAMINATION: ICD-10-CM

## 2024-01-26 DIAGNOSIS — I34.0 NONRHEUMATIC MITRAL (VALVE) INSUFFICIENCY: ICD-10-CM

## 2024-01-26 PROBLEM — R06.02 SHORTNESS OF BREATH ON EXERTION: Status: ACTIVE | Noted: 2022-10-18

## 2024-01-26 LAB
CULTURE RESULTS: SIGNIFICANT CHANGE UP
SPECIMEN SOURCE: SIGNIFICANT CHANGE UP

## 2024-01-26 NOTE — PHYSICAL EXAM
[Sclera] : the sclera and conjunctiva were normal [PERRL With Normal Accommodation] : pupils were equal in size, round, and reactive to light [Extraocular Movements] : extraocular movements were intact [Neck Appearance] : the appearance of the neck was normal [Neck Cervical Mass (___cm)] : no neck mass was observed [Jugular Venous Distention Increased] : there was no jugular-venous distention [Thyroid Diffuse Enlargement] : the thyroid was not enlarged [Thyroid Nodule] : there were no palpable thyroid nodules [Auscultation Breath Sounds / Voice Sounds] : lungs were clear to auscultation bilaterally [III] : a grade 3 [Bowel Sounds] : normal bowel sounds [Abdomen Soft] : soft [Abdomen Tenderness] : non-tender [Abdomen Mass (___ Cm)] : no abdominal mass palpated [Skin Color & Pigmentation] : normal skin color and pigmentation [Skin Turgor] : normal skin turgor [] : no rash [Deep Tendon Reflexes (DTR)] : deep tendon reflexes were 2+ and symmetric [Sensation] : the sensory exam was normal to light touch and pinprick [No Focal Deficits] : no focal deficits [Oriented To Time, Place, And Person] : oriented to person, place, and time [Impaired Insight] : insight and judgment were intact [Affect] : the affect was normal

## 2024-01-30 ENCOUNTER — APPOINTMENT (OUTPATIENT)
Dept: HEMATOLOGY ONCOLOGY | Facility: CLINIC | Age: 84
End: 2024-01-30

## 2024-02-01 ENCOUNTER — NON-APPOINTMENT (OUTPATIENT)
Age: 84
End: 2024-02-01

## 2024-02-02 NOTE — ASSESSMENT
[FreeTextEntry1] : Plan: #Mitral Regurgitation Moderate to Severe by Independant Evaluation BALDEMAR is Moderate to Severe Will nee repair via Pilar Clip NYHA class II Lives home alone no aid Euvolemic on Exam ON GDMT  #CHF On Farxiga continue Continue Spironolactone   #CAD Continue Plavix and Aspirin Continue Statin  Patient seen and examined History and physical as per above Briefly, severe MR  Continue to planning for MitraClip  I, Dr. Chou, saw, examined, and reviewed the diagnostic images with the patient and agree with my nurse practitioners clinic note, physical exam findings, and treatment plan.  Lazaro Chou MD

## 2024-02-02 NOTE — HISTORY OF PRESENT ILLNESS
[FreeTextEntry1] : ASAD MENDOSA is a 83 year F. She arrives today to the Structural Office for evaluation of their mitral regurgitation. past medical history of GIB, A fib (off anticoagulation due to GIB), s/p Watchman device insertion, HTN, HLD, diverticulosis, non-erosive gastritis, aortic stenosis and hypothyroidism presented early October for elective TAVR due to progressive dyspnea and fatigue related to AS. On 10/11/23, she went to the cath lab for TAVR. Ms. MENDOSA was followed by the structural heart team. Her SOB has not improved since the first week of TAVR. She has moderate to severe MR. Imaging seen by hour Structural Echocardiographer who determined MR is more severe than moderate. Symptoms today are STRONG, fatigue. NYHA class II. Patient was examined. Shared Decision making was done with the patient using the STS risk score which was calculated and discussed with the patient. Surgery was recommended and all risk and alternatives to surgery were discussed with the patient.

## 2024-02-07 ENCOUNTER — INPATIENT (INPATIENT)
Facility: HOSPITAL | Age: 84
LOS: 1 days | Discharge: HOME | DRG: 267 | End: 2024-02-09
Attending: THORACIC SURGERY (CARDIOTHORACIC VASCULAR SURGERY) | Admitting: THORACIC SURGERY (CARDIOTHORACIC VASCULAR SURGERY)
Payer: MEDICARE

## 2024-02-07 ENCOUNTER — TRANSCRIPTION ENCOUNTER (OUTPATIENT)
Age: 84
End: 2024-02-07

## 2024-02-07 ENCOUNTER — RESULT REVIEW (OUTPATIENT)
Age: 84
End: 2024-02-07

## 2024-02-07 ENCOUNTER — APPOINTMENT (OUTPATIENT)
Dept: CARDIOTHORACIC SURGERY | Facility: HOSPITAL | Age: 84
End: 2024-02-07

## 2024-02-07 ENCOUNTER — RX RENEWAL (OUTPATIENT)
Age: 84
End: 2024-02-07

## 2024-02-07 VITALS
WEIGHT: 199.96 LBS | HEART RATE: 76 BPM | OXYGEN SATURATION: 100 % | TEMPERATURE: 98 F | SYSTOLIC BLOOD PRESSURE: 120 MMHG | HEIGHT: 62.99 IN | RESPIRATION RATE: 16 BRPM | DIASTOLIC BLOOD PRESSURE: 74 MMHG

## 2024-02-07 DIAGNOSIS — Z95.5 PRESENCE OF CORONARY ANGIOPLASTY IMPLANT AND GRAFT: Chronic | ICD-10-CM

## 2024-02-07 DIAGNOSIS — Z95.818 PRESENCE OF OTHER CARDIAC IMPLANTS AND GRAFTS: Chronic | ICD-10-CM

## 2024-02-07 DIAGNOSIS — Z90.49 ACQUIRED ABSENCE OF OTHER SPECIFIED PARTS OF DIGESTIVE TRACT: Chronic | ICD-10-CM

## 2024-02-07 DIAGNOSIS — I34.0 NONRHEUMATIC MITRAL (VALVE) INSUFFICIENCY: ICD-10-CM

## 2024-02-07 LAB
ALBUMIN SERPL ELPH-MCNC: 3.6 G/DL — SIGNIFICANT CHANGE UP (ref 3.5–5.2)
ALP SERPL-CCNC: 117 U/L — HIGH (ref 30–115)
ALT FLD-CCNC: 13 U/L — SIGNIFICANT CHANGE UP (ref 0–41)
ANION GAP SERPL CALC-SCNC: 10 MMOL/L — SIGNIFICANT CHANGE UP (ref 7–14)
ANION GAP SERPL CALC-SCNC: 14 MMOL/L — SIGNIFICANT CHANGE UP (ref 7–14)
APTT BLD: 54.6 SEC — HIGH (ref 27–39.2)
APTT BLD: >200 SEC — SIGNIFICANT CHANGE UP (ref 27–39.2)
AST SERPL-CCNC: 24 U/L — SIGNIFICANT CHANGE UP (ref 0–41)
BASOPHILS # BLD AUTO: 0.03 K/UL — SIGNIFICANT CHANGE UP (ref 0–0.2)
BASOPHILS NFR BLD AUTO: 0.3 % — SIGNIFICANT CHANGE UP (ref 0–1)
BILIRUB SERPL-MCNC: 0.6 MG/DL — SIGNIFICANT CHANGE UP (ref 0.2–1.2)
BLD GP AB SCN SERPL QL: SIGNIFICANT CHANGE UP
BUN SERPL-MCNC: 21 MG/DL — HIGH (ref 10–20)
BUN SERPL-MCNC: 23 MG/DL — HIGH (ref 10–20)
CALCIUM SERPL-MCNC: 7.3 MG/DL — LOW (ref 8.4–10.5)
CALCIUM SERPL-MCNC: 8.7 MG/DL — SIGNIFICANT CHANGE UP (ref 8.4–10.5)
CHLORIDE SERPL-SCNC: 102 MMOL/L — SIGNIFICANT CHANGE UP (ref 98–110)
CHLORIDE SERPL-SCNC: 107 MMOL/L — SIGNIFICANT CHANGE UP (ref 98–110)
CO2 SERPL-SCNC: 24 MMOL/L — SIGNIFICANT CHANGE UP (ref 17–32)
CO2 SERPL-SCNC: 25 MMOL/L — SIGNIFICANT CHANGE UP (ref 17–32)
CREAT SERPL-MCNC: 0.7 MG/DL — SIGNIFICANT CHANGE UP (ref 0.7–1.5)
CREAT SERPL-MCNC: 0.8 MG/DL — SIGNIFICANT CHANGE UP (ref 0.7–1.5)
EGFR: 73 ML/MIN/1.73M2 — SIGNIFICANT CHANGE UP
EGFR: 85 ML/MIN/1.73M2 — SIGNIFICANT CHANGE UP
EOSINOPHIL # BLD AUTO: 0.05 K/UL — SIGNIFICANT CHANGE UP (ref 0–0.7)
EOSINOPHIL NFR BLD AUTO: 0.6 % — SIGNIFICANT CHANGE UP (ref 0–8)
GAS PNL BLDA: SIGNIFICANT CHANGE UP
GLUCOSE BLDC GLUCOMTR-MCNC: 151 MG/DL — HIGH (ref 70–99)
GLUCOSE SERPL-MCNC: 140 MG/DL — HIGH (ref 70–99)
GLUCOSE SERPL-MCNC: 82 MG/DL — SIGNIFICANT CHANGE UP (ref 70–99)
HCT VFR BLD CALC: 34.5 % — LOW (ref 37–47)
HCT VFR BLD CALC: 38.8 % — SIGNIFICANT CHANGE UP (ref 37–47)
HGB BLD-MCNC: 10.9 G/DL — LOW (ref 12–16)
HGB BLD-MCNC: 11.9 G/DL — LOW (ref 12–16)
IMM GRANULOCYTES NFR BLD AUTO: 0.3 % — SIGNIFICANT CHANGE UP (ref 0.1–0.3)
INR BLD: 1.19 RATIO — SIGNIFICANT CHANGE UP (ref 0.65–1.3)
INR BLD: 1.38 RATIO — HIGH (ref 0.65–1.3)
LYMPHOCYTES # BLD AUTO: 0.69 K/UL — LOW (ref 1.2–3.4)
LYMPHOCYTES # BLD AUTO: 7.7 % — LOW (ref 20.5–51.1)
MAGNESIUM SERPL-MCNC: 2.3 MG/DL — SIGNIFICANT CHANGE UP (ref 1.8–2.4)
MCHC RBC-ENTMCNC: 29.7 PG — SIGNIFICANT CHANGE UP (ref 27–31)
MCHC RBC-ENTMCNC: 29.9 PG — SIGNIFICANT CHANGE UP (ref 27–31)
MCHC RBC-ENTMCNC: 30.7 G/DL — LOW (ref 32–37)
MCHC RBC-ENTMCNC: 31.6 G/DL — LOW (ref 32–37)
MCV RBC AUTO: 94.8 FL — SIGNIFICANT CHANGE UP (ref 81–99)
MCV RBC AUTO: 96.8 FL — SIGNIFICANT CHANGE UP (ref 81–99)
MONOCYTES # BLD AUTO: 0.14 K/UL — SIGNIFICANT CHANGE UP (ref 0.1–0.6)
MONOCYTES NFR BLD AUTO: 1.6 % — LOW (ref 1.7–9.3)
NEUTROPHILS # BLD AUTO: 8.05 K/UL — HIGH (ref 1.4–6.5)
NEUTROPHILS NFR BLD AUTO: 89.5 % — HIGH (ref 42.2–75.2)
NRBC # BLD: 0 /100 WBCS — SIGNIFICANT CHANGE UP (ref 0–0)
NRBC # BLD: 0 /100 WBCS — SIGNIFICANT CHANGE UP (ref 0–0)
PLATELET # BLD AUTO: 210 K/UL — SIGNIFICANT CHANGE UP (ref 130–400)
PLATELET # BLD AUTO: 238 K/UL — SIGNIFICANT CHANGE UP (ref 130–400)
PMV BLD: 11.5 FL — HIGH (ref 7.4–10.4)
PMV BLD: 11.5 FL — HIGH (ref 7.4–10.4)
POTASSIUM SERPL-MCNC: 3.6 MMOL/L — SIGNIFICANT CHANGE UP (ref 3.5–5)
POTASSIUM SERPL-MCNC: 4.2 MMOL/L — SIGNIFICANT CHANGE UP (ref 3.5–5)
POTASSIUM SERPL-SCNC: 3.6 MMOL/L — SIGNIFICANT CHANGE UP (ref 3.5–5)
POTASSIUM SERPL-SCNC: 4.2 MMOL/L — SIGNIFICANT CHANGE UP (ref 3.5–5)
PROT SERPL-MCNC: 6.9 G/DL — SIGNIFICANT CHANGE UP (ref 6–8)
PROTHROM AB SERPL-ACNC: 13.6 SEC — HIGH (ref 9.95–12.87)
PROTHROM AB SERPL-ACNC: 15.8 SEC — HIGH (ref 9.95–12.87)
RBC # BLD: 3.64 M/UL — LOW (ref 4.2–5.4)
RBC # BLD: 4.01 M/UL — LOW (ref 4.2–5.4)
RBC # FLD: 15.4 % — HIGH (ref 11.5–14.5)
RBC # FLD: 15.5 % — HIGH (ref 11.5–14.5)
SODIUM SERPL-SCNC: 140 MMOL/L — SIGNIFICANT CHANGE UP (ref 135–146)
SODIUM SERPL-SCNC: 142 MMOL/L — SIGNIFICANT CHANGE UP (ref 135–146)
WBC # BLD: 8.99 K/UL — SIGNIFICANT CHANGE UP (ref 4.8–10.8)
WBC # BLD: 9.55 K/UL — SIGNIFICANT CHANGE UP (ref 4.8–10.8)
WBC # FLD AUTO: 8.99 K/UL — SIGNIFICANT CHANGE UP (ref 4.8–10.8)
WBC # FLD AUTO: 9.55 K/UL — SIGNIFICANT CHANGE UP (ref 4.8–10.8)

## 2024-02-07 PROCEDURE — 33418 REPAIR TCAT MITRAL VALVE: CPT | Mod: 62,Q0

## 2024-02-07 PROCEDURE — 71045 X-RAY EXAM CHEST 1 VIEW: CPT

## 2024-02-07 PROCEDURE — C1894: CPT

## 2024-02-07 PROCEDURE — 86900 BLOOD TYPING SEROLOGIC ABO: CPT

## 2024-02-07 PROCEDURE — 36415 COLL VENOUS BLD VENIPUNCTURE: CPT

## 2024-02-07 PROCEDURE — 82962 GLUCOSE BLOOD TEST: CPT

## 2024-02-07 PROCEDURE — 92610 EVALUATE SWALLOWING FUNCTION: CPT | Mod: GN

## 2024-02-07 PROCEDURE — 85014 HEMATOCRIT: CPT

## 2024-02-07 PROCEDURE — 86902 BLOOD TYPE ANTIGEN DONOR EA: CPT

## 2024-02-07 PROCEDURE — 86850 RBC ANTIBODY SCREEN: CPT

## 2024-02-07 PROCEDURE — 80053 COMPREHEN METABOLIC PANEL: CPT

## 2024-02-07 PROCEDURE — 83735 ASSAY OF MAGNESIUM: CPT

## 2024-02-07 PROCEDURE — 83605 ASSAY OF LACTIC ACID: CPT

## 2024-02-07 PROCEDURE — 94640 AIRWAY INHALATION TREATMENT: CPT

## 2024-02-07 PROCEDURE — 86922 COMPATIBILITY TEST ANTIGLOB: CPT

## 2024-02-07 PROCEDURE — 97161 PT EVAL LOW COMPLEX 20 MIN: CPT | Mod: GP

## 2024-02-07 PROCEDURE — 80048 BASIC METABOLIC PNL TOTAL CA: CPT

## 2024-02-07 PROCEDURE — 85018 HEMOGLOBIN: CPT

## 2024-02-07 PROCEDURE — C1887: CPT

## 2024-02-07 PROCEDURE — 93318 ECHO TRANSESOPHAGEAL INTRAOP: CPT

## 2024-02-07 PROCEDURE — 93005 ELECTROCARDIOGRAM TRACING: CPT

## 2024-02-07 PROCEDURE — 31622 DX BRONCHOSCOPE/WASH: CPT

## 2024-02-07 PROCEDURE — 84132 ASSAY OF SERUM POTASSIUM: CPT

## 2024-02-07 PROCEDURE — 71045 X-RAY EXAM CHEST 1 VIEW: CPT | Mod: 26

## 2024-02-07 PROCEDURE — 43235 EGD DIAGNOSTIC BRUSH WASH: CPT

## 2024-02-07 PROCEDURE — 85730 THROMBOPLASTIN TIME PARTIAL: CPT

## 2024-02-07 PROCEDURE — 97110 THERAPEUTIC EXERCISES: CPT | Mod: GP

## 2024-02-07 PROCEDURE — C1769: CPT

## 2024-02-07 PROCEDURE — 85027 COMPLETE CBC AUTOMATED: CPT

## 2024-02-07 PROCEDURE — 85025 COMPLETE CBC W/AUTO DIFF WBC: CPT

## 2024-02-07 PROCEDURE — 71045 X-RAY EXAM CHEST 1 VIEW: CPT | Mod: 26,77

## 2024-02-07 PROCEDURE — 86901 BLOOD TYPING SEROLOGIC RH(D): CPT

## 2024-02-07 PROCEDURE — 82803 BLOOD GASES ANY COMBINATION: CPT

## 2024-02-07 PROCEDURE — 93306 TTE W/DOPPLER COMPLETE: CPT | Mod: 26

## 2024-02-07 PROCEDURE — C1889: CPT

## 2024-02-07 PROCEDURE — 82330 ASSAY OF CALCIUM: CPT

## 2024-02-07 PROCEDURE — 84295 ASSAY OF SERUM SODIUM: CPT

## 2024-02-07 PROCEDURE — 93306 TTE W/DOPPLER COMPLETE: CPT

## 2024-02-07 PROCEDURE — 85610 PROTHROMBIN TIME: CPT

## 2024-02-07 RX ORDER — POLYETHYLENE GLYCOL 3350 17 G/17G
17 POWDER, FOR SOLUTION ORAL DAILY
Refills: 0 | Status: DISCONTINUED | OUTPATIENT
Start: 2024-02-07 | End: 2024-02-09

## 2024-02-07 RX ORDER — DEXAMETHASONE 0.5 MG/5ML
6 ELIXIR ORAL EVERY 12 HOURS
Refills: 0 | Status: COMPLETED | OUTPATIENT
Start: 2024-02-08 | End: 2024-02-08

## 2024-02-07 RX ORDER — NITROGLYCERIN 6.5 MG
5 CAPSULE, EXTENDED RELEASE ORAL
Qty: 50 | Refills: 0 | Status: DISCONTINUED | OUTPATIENT
Start: 2024-02-07 | End: 2024-02-09

## 2024-02-07 RX ORDER — FUROSEMIDE 40 MG
40 TABLET ORAL DAILY
Refills: 0 | Status: DISCONTINUED | OUTPATIENT
Start: 2024-02-08 | End: 2024-02-09

## 2024-02-07 RX ORDER — LEVOTHYROXINE SODIUM 125 MCG
112 TABLET ORAL DAILY
Refills: 0 | Status: DISCONTINUED | OUTPATIENT
Start: 2024-02-08 | End: 2024-02-09

## 2024-02-07 RX ORDER — OXYCODONE HYDROCHLORIDE 5 MG/1
5 TABLET ORAL EVERY 6 HOURS
Refills: 0 | Status: DISCONTINUED | OUTPATIENT
Start: 2024-02-07 | End: 2024-02-09

## 2024-02-07 RX ORDER — DEXMEDETOMIDINE HYDROCHLORIDE IN 0.9% SODIUM CHLORIDE 4 UG/ML
0.25 INJECTION INTRAVENOUS
Qty: 200 | Refills: 0 | Status: DISCONTINUED | OUTPATIENT
Start: 2024-02-07 | End: 2024-02-09

## 2024-02-07 RX ORDER — FOLIC ACID 0.8 MG
1 TABLET ORAL DAILY
Refills: 0 | Status: DISCONTINUED | OUTPATIENT
Start: 2024-02-08 | End: 2024-02-09

## 2024-02-07 RX ORDER — EPINEPHRINE 11.25MG/ML
0.5 SOLUTION, NON-ORAL INHALATION ONCE
Refills: 0 | Status: COMPLETED | OUTPATIENT
Start: 2024-02-07 | End: 2024-02-07

## 2024-02-07 RX ORDER — NICARDIPINE HYDROCHLORIDE 30 MG/1
5 CAPSULE, EXTENDED RELEASE ORAL
Qty: 40 | Refills: 0 | Status: DISCONTINUED | OUTPATIENT
Start: 2024-02-07 | End: 2024-02-09

## 2024-02-07 RX ORDER — DEXAMETHASONE 0.5 MG/5ML
10 ELIXIR ORAL EVERY 12 HOURS
Refills: 0 | Status: DISCONTINUED | OUTPATIENT
Start: 2024-02-07 | End: 2024-02-07

## 2024-02-07 RX ORDER — ASPIRIN/CALCIUM CARB/MAGNESIUM 324 MG
81 TABLET ORAL DAILY
Refills: 0 | Status: DISCONTINUED | OUTPATIENT
Start: 2024-02-08 | End: 2024-02-09

## 2024-02-07 RX ORDER — CHLORHEXIDINE GLUCONATE 213 G/1000ML
1 SOLUTION TOPICAL DAILY
Refills: 0 | Status: DISCONTINUED | OUTPATIENT
Start: 2024-02-08 | End: 2024-02-09

## 2024-02-07 RX ORDER — ACETAMINOPHEN 500 MG
650 TABLET ORAL EVERY 6 HOURS
Refills: 0 | Status: DISCONTINUED | OUTPATIENT
Start: 2024-02-07 | End: 2024-02-09

## 2024-02-07 RX ORDER — FUROSEMIDE 40 MG
1 TABLET ORAL
Refills: 0 | DISCHARGE

## 2024-02-07 RX ORDER — ONDANSETRON 8 MG/1
4 TABLET, FILM COATED ORAL ONCE
Refills: 0 | Status: DISCONTINUED | OUTPATIENT
Start: 2024-02-07 | End: 2024-02-07

## 2024-02-07 RX ORDER — FOLIC ACID 0.8 MG
1 TABLET ORAL
Refills: 0 | DISCHARGE

## 2024-02-07 RX ORDER — ACETAMINOPHEN 500 MG
1000 TABLET ORAL ONCE
Refills: 0 | Status: COMPLETED | OUTPATIENT
Start: 2024-02-07 | End: 2024-02-07

## 2024-02-07 RX ORDER — ONDANSETRON 8 MG/1
4 TABLET, FILM COATED ORAL EVERY 6 HOURS
Refills: 0 | Status: DISCONTINUED | OUTPATIENT
Start: 2024-02-07 | End: 2024-02-09

## 2024-02-07 RX ORDER — METOPROLOL TARTRATE 50 MG
50 TABLET ORAL DAILY
Refills: 0 | Status: DISCONTINUED | OUTPATIENT
Start: 2024-02-08 | End: 2024-02-09

## 2024-02-07 RX ORDER — SODIUM CHLORIDE 9 MG/ML
1000 INJECTION INTRAMUSCULAR; INTRAVENOUS; SUBCUTANEOUS
Refills: 0 | Status: DISCONTINUED | OUTPATIENT
Start: 2024-02-07 | End: 2024-02-09

## 2024-02-07 RX ORDER — VANCOMYCIN HCL 1 G
1000 VIAL (EA) INTRAVENOUS EVERY 12 HOURS
Refills: 0 | Status: COMPLETED | OUTPATIENT
Start: 2024-02-07 | End: 2024-02-09

## 2024-02-07 RX ORDER — NOREPINEPHRINE BITARTRATE/D5W 8 MG/250ML
0.05 PLASTIC BAG, INJECTION (ML) INTRAVENOUS
Qty: 8 | Refills: 0 | Status: DISCONTINUED | OUTPATIENT
Start: 2024-02-07 | End: 2024-02-09

## 2024-02-07 RX ORDER — PANTOPRAZOLE SODIUM 20 MG/1
40 TABLET, DELAYED RELEASE ORAL
Refills: 0 | Status: DISCONTINUED | OUTPATIENT
Start: 2024-02-08 | End: 2024-02-09

## 2024-02-07 RX ORDER — CLOPIDOGREL BISULFATE 75 MG/1
75 TABLET, FILM COATED ORAL DAILY
Refills: 0 | Status: DISCONTINUED | OUTPATIENT
Start: 2024-02-08 | End: 2024-02-09

## 2024-02-07 RX ADMIN — Medication 400 MILLIGRAM(S): at 19:24

## 2024-02-07 RX ADMIN — Medication 0.5 MILLILITER(S): at 18:09

## 2024-02-07 NOTE — DISCHARGE NOTE PROVIDER - CARE PROVIDERS DIRECT ADDRESSES
,DirectAddress_Unknown,DirectAddress_Unknown,[email or web address] ,DirectAddress_Unknown,[email or web address],DirectAddress_Unknown

## 2024-02-07 NOTE — PROCEDURE NOTE - ADDITIONAL PROCEDURE DETAILS
Called emergently to assess for possible oral bleeding. Pt is s/p elective Mitral Clip and BALDEMAR. Presented to pt's bedside STAT. No signs of oral lacerations after gargling with water but patient kept coughing up blood. Decision made to perform laryngoscopy to assess for laryngeal laceration. No signs of laryngeal lacerations/bleeding; blood tinged secretions noted throughout larynx and around glottis. Unknown whether source of bleeding is from esophagus or trachea at this time. Recommend bronch vs EGD to investigate for tracheal/esophageal source of bleeding. Discussed with CT surgery PA and cardiac intensivist. Recall prn .

## 2024-02-07 NOTE — CHART NOTE - NSCHARTNOTEFT_GEN_A_CORE
PACU ANESTHESIA ADMISSION NOTE      Procedure: bronchoscopy, endoscopy      Post op diagnosis:  partial thickness tracheal tear        ____  Intubated  TV:______       Rate: ______      FiO2: ______    __x__  Patent Airway    _x___  Full return of protective reflexes    __x__  Full recovery from anesthesia / back to baseline     Vitals:   T:   98        R:  16                BP: 129/68                 Sat:  98                 P: 105      Mental Status:  _x___ Awake   _x____ Alert   _____ Drowsy   _____ Sedated    Nausea/Vomiting:  ___x_ NO  ______Yes,   See Post - Op Orders          Pain Scale (0-10):  ___0__    Treatment: ____ None    ___x_ See Post - Op/PCA Orders    Post - Operative Fluids:   ____ Oral   __x__ See Post - Op Orders    Plan: Discharge:   ____Home       _____Floor     ___x__Critical Care    _____  Other:_________________    Comments:

## 2024-02-07 NOTE — DISCHARGE NOTE PROVIDER - PROVIDER TOKENS
FREE:[LAST:[[facility]],PHONE:[phone]],FAX:[(   )    -],ADDRESS:[[address] [place] [address], [place] [address]]],PROVIDER:[TOKEN:[[id number]],FOLLOWUP:[1 month]],PROVIDER:[TOKEN:[[id number]],FOLLOWUP:[1 month]] PROVIDER:[TOKEN:[[id number]],FOLLOWUP:[1 month]],PROVIDER:[TOKEN:[[id number]],FOLLOWUP:[1 month]],FREE:[LAST:[[facility]],PHONE:[phone]],FAX:[(   )    -],ADDRESS:[[address] [place] [address]],SCHEDULEDAPPT:[02/15/2024],SCHEDULEDAPPTTIME:[01:30 PM]]

## 2024-02-07 NOTE — DISCHARGE NOTE PROVIDER - NSDCMRMEDTOKEN_GEN_ALL_CORE_FT
aspirin 81 mg oral delayed release tablet: 1 tab(s) orally once a day  clopidogrel 75 mg oral tablet: 1 tab(s) orally once a day  folic acid 1 mg oral tablet: 1 tab(s) orally once a day  Lasix 40 mg oral tablet: 1 tab(s) orally once a day  levothyroxine 112 mcg (0.112 mg) oral tablet: 1 tab(s) orally once a day  metoprolol succinate 50 mg oral tablet, extended release: 1 tab(s) orally once a day  Multiple Vitamins oral capsule: 1 cap(s) orally once a day   acetaminophen 325 mg oral tablet: 2 tab(s) orally every 6 hours As needed Temp greater or equal to 38C (100.4F), Mild Pain (1 - 3)  aspirin 81 mg oral delayed release tablet: 1 tab(s) orally once a day  clopidogrel 75 mg oral tablet: 1 tab(s) orally once a day  folic acid 1 mg oral tablet: 1 tab(s) orally once a day  Lasix 40 mg oral tablet: 1 tab(s) orally once a day  levothyroxine 112 mcg (0.112 mg) oral tablet: 1 tab(s) orally once a day  metoprolol succinate 50 mg oral tablet, extended release: 1 tab(s) orally once a day  Multiple Vitamins oral capsule: 1 cap(s) orally once a day

## 2024-02-07 NOTE — DISCHARGE NOTE PROVIDER - CARE PROVIDER_API CALL
[facility],   [address] [place] [address], [address]  [place] [address]  Phone: [phone]  Fax: (   )    -  Follow Up Time:     ROSELYN SELBY, AMI  [address] [place] [address]  Phone: [phone]  Fax: [phone]  Follow Up Time: 1 month    Behuria, Supreeti  Cardiology  [address] [place] [address], [address]  [place] [address]  Phone: [phone]  Fax: [phone]  Follow Up Time: 1 month   ROSELYN SELBY, AMI  [address] [place] [address]  Phone: [phone]  Fax: [phone]  Follow Up Time: 1 month    Behuria, Supreeti  Cardiology  [address] [place] [address], [address]  [place] [address]  Phone: [phone]  Fax: [phone]  Follow Up Time: 1 month    [facility] [address], [address]  [place] [address]  Phone: [phone]  Fax: (   )    -  Scheduled Appointment: 02/15/2024 01:30 PM

## 2024-02-07 NOTE — DISCHARGE NOTE PROVIDER - NSDCCPTREATMENT_GEN_ALL_CORE_FT
PRINCIPAL PROCEDURE  Procedure: Insertion, leaflet clip, mitral valve  Findings and Treatment: Anaplan Mitral Clip G4 Delivery system, Lot [id number]

## 2024-02-07 NOTE — PRE-ANESTHESIA EVALUATION ADULT - NSANTHOSAYNRD_GEN_A_CORE
No. MARIETTA screening performed.  STOP BANG Legend: 0-2 = LOW Risk; 3-4 = INTERMEDIATE Risk; 5-8 = HIGH Risk
No. MARIETTA screening performed.  STOP BANG Legend: 0-2 = LOW Risk; 3-4 = INTERMEDIATE Risk; 5-8 = HIGH Risk

## 2024-02-07 NOTE — DISCHARGE NOTE PROVIDER - HOSPITAL COURSE
82 YO F ex-smoker (quit 40 years ago) with PMHx of A.fib (off anticoagulation due to GIB, s/p Watchman), HTN, HLD, diverticulosis, non-erosive gastritis, aortic stenosis s/p recent BAV, CAD s/p recent PCI to dLM/pLAD, mitral insuffiencey hypothyroidism presented this admission for elective Mitral Clip. Her post op course was    A discussion was conducted with the patient regarding the importance and benefits of participating in a cardiothoracic rehabilitation program. Contact information given to the patient. Patient instructed to inquire further upon seeing their cardiologist post op. 82 YO F ex-smoker (quit 40 years ago) with PMHx of A.fib (off anticoagulation due to GIB, s/p Watchman), HTN, HLD, diverticulosis, non-erosive gastritis, aortic stenosis s/p recent BAV, CAD s/p recent PCI to dLM/pLAD, mitral insuffiencey hypothyroidism presented this admission for elective Mitral Clip. Her post op course was     84 YO F ex-smoker (quit 40 years ago) with PMHx of A.fib (off anticoagulation due to GIB, s/p Watchman), HTN, HLD, diverticulosis, non-erosive gastritis, aortic stenosis s/p recent BAV, CAD s/p recent PCI to dLM/pLAD, mitral insuffiencey hypothyroidism presented this admission for elective Mitral Clip. Within 2 hours post op patient started with hemoptysis and requiring return to OR for EGD and Bronchoscopy. Findings were a superficial abrasion of the trachea with conservative treatment as recommendation.      82 YO F ex-smoker (quit 40 years ago) with PMHx of A.fib (off anticoagulation due to GIB, s/p Watchman), HTN, HLD, diverticulosis, non-erosive gastritis, aortic stenosis s/p  BAV, CAD s/p recent PCI to dLM/pLAD,  subsequent TAVR, mitral insuffiencey hypothyroidism presented this admission for elective Mitral Clip. Within 2 hours post op patient started with hemoptysis and requiring return to OR for EGD and Bronchoscopy. Findings were a superficial abrasion of the trachea with conservative treatment as recommendation.      84 YO F ex-smoker (quit 40 years ago) with PMHx of A.fib (off anticoagulation due to GIB, s/p Watchman), HTN, HLD, diverticulosis, non-erosive gastritis, aortic stenosis s/p  BAV, CAD s/p recent PCI to dLM/pLAD,  subsequent TAVR, mitral insuffiencey hypothyroidism presented this admission for elective Mitral Clip. Within 2 hours post op patient started with hemoptysis and requiring return to OR for EGD and Bronchoscopy. Findings were a superficial abrasion of the trachea, which was treated with injections of epinephrine. Post-operatively, patient had an uneventful hospital course. She was discharged home on POD#2 in stable condition with instructions to [facility]/15/2024@1:30pm. A discussion was conducted with the patient regarding the benefits and importance of participating in a cardiothoracic rehabilitation program. Contact information given to patient. Patient instructed to inquire further upon seeing her cardiologist post operatively.

## 2024-02-07 NOTE — BRIEF OPERATIVE NOTE - OPERATION/FINDINGS
as per dictation as per dictation  Single NTW MitraClip  Significant reduction in the mitral regurgitation  Tolerated the procedure well

## 2024-02-07 NOTE — DISCHARGE NOTE PROVIDER - NSDCCPCAREPLAN_GEN_ALL_CORE_FT
PRINCIPAL DISCHARGE DIAGNOSIS  Diagnosis: Mitral insufficiency  Assessment and Plan of Treatment:

## 2024-02-07 NOTE — CHART NOTE - NSCHARTNOTEFT_GEN_A_CORE
PREOPERATIVE DAY OF PROCEDURE EVALUATION:  I have personally seen and examined the patient. I agree with the history and physical which I have reviewed and noted any changes below. ( to be signed electronically by MD ) 02-07-24 @ 11:11

## 2024-02-07 NOTE — BRIEF OPERATIVE NOTE - NSICDXBRIEFPROCEDURE_GEN_ALL_CORE_FT
PROCEDURES:  Insertion, leaflet clip, mitral valve 07-Feb-2024 18:00:09 utiliz Mitral Clip G4 Delivery system, Lot [id number] Oscar Jordan

## 2024-02-08 LAB
ALBUMIN SERPL ELPH-MCNC: 3.5 G/DL — SIGNIFICANT CHANGE UP (ref 3.5–5.2)
ALP SERPL-CCNC: 108 U/L — SIGNIFICANT CHANGE UP (ref 30–115)
ALT FLD-CCNC: 13 U/L — SIGNIFICANT CHANGE UP (ref 0–41)
ANION GAP SERPL CALC-SCNC: 7 MMOL/L — SIGNIFICANT CHANGE UP (ref 7–14)
AST SERPL-CCNC: 20 U/L — SIGNIFICANT CHANGE UP (ref 0–41)
BASOPHILS # BLD AUTO: 0.01 K/UL — SIGNIFICANT CHANGE UP (ref 0–0.2)
BASOPHILS NFR BLD AUTO: 0.1 % — SIGNIFICANT CHANGE UP (ref 0–1)
BILIRUB SERPL-MCNC: 0.5 MG/DL — SIGNIFICANT CHANGE UP (ref 0.2–1.2)
BUN SERPL-MCNC: 21 MG/DL — HIGH (ref 10–20)
CALCIUM SERPL-MCNC: 8.7 MG/DL — SIGNIFICANT CHANGE UP (ref 8.4–10.5)
CHLORIDE SERPL-SCNC: 101 MMOL/L — SIGNIFICANT CHANGE UP (ref 98–110)
CO2 SERPL-SCNC: 28 MMOL/L — SIGNIFICANT CHANGE UP (ref 17–32)
CREAT SERPL-MCNC: 0.7 MG/DL — SIGNIFICANT CHANGE UP (ref 0.7–1.5)
EGFR: 85 ML/MIN/1.73M2 — SIGNIFICANT CHANGE UP
EOSINOPHIL # BLD AUTO: 0 K/UL — SIGNIFICANT CHANGE UP (ref 0–0.7)
EOSINOPHIL NFR BLD AUTO: 0 % — SIGNIFICANT CHANGE UP (ref 0–8)
GAS PNL BLDA: SIGNIFICANT CHANGE UP
GLUCOSE SERPL-MCNC: 146 MG/DL — HIGH (ref 70–99)
HCT VFR BLD CALC: 31.7 % — LOW (ref 37–47)
HGB BLD-MCNC: 10 G/DL — LOW (ref 12–16)
IMM GRANULOCYTES NFR BLD AUTO: 0.4 % — HIGH (ref 0.1–0.3)
LYMPHOCYTES # BLD AUTO: 0.77 K/UL — LOW (ref 1.2–3.4)
LYMPHOCYTES # BLD AUTO: 9.6 % — LOW (ref 20.5–51.1)
MAGNESIUM SERPL-MCNC: 2.4 MG/DL — SIGNIFICANT CHANGE UP (ref 1.8–2.4)
MCHC RBC-ENTMCNC: 30 PG — SIGNIFICANT CHANGE UP (ref 27–31)
MCHC RBC-ENTMCNC: 31.5 G/DL — LOW (ref 32–37)
MCV RBC AUTO: 95.2 FL — SIGNIFICANT CHANGE UP (ref 81–99)
MONOCYTES # BLD AUTO: 0.26 K/UL — SIGNIFICANT CHANGE UP (ref 0.1–0.6)
MONOCYTES NFR BLD AUTO: 3.2 % — SIGNIFICANT CHANGE UP (ref 1.7–9.3)
NEUTROPHILS # BLD AUTO: 6.95 K/UL — HIGH (ref 1.4–6.5)
NEUTROPHILS NFR BLD AUTO: 86.7 % — HIGH (ref 42.2–75.2)
NRBC # BLD: 0 /100 WBCS — SIGNIFICANT CHANGE UP (ref 0–0)
PLATELET # BLD AUTO: 194 K/UL — SIGNIFICANT CHANGE UP (ref 130–400)
PMV BLD: 11.4 FL — HIGH (ref 7.4–10.4)
POTASSIUM SERPL-MCNC: 4.3 MMOL/L — SIGNIFICANT CHANGE UP (ref 3.5–5)
POTASSIUM SERPL-SCNC: 4.3 MMOL/L — SIGNIFICANT CHANGE UP (ref 3.5–5)
PROT SERPL-MCNC: 6.3 G/DL — SIGNIFICANT CHANGE UP (ref 6–8)
RBC # BLD: 3.33 M/UL — LOW (ref 4.2–5.4)
RBC # FLD: 15.4 % — HIGH (ref 11.5–14.5)
SODIUM SERPL-SCNC: 136 MMOL/L — SIGNIFICANT CHANGE UP (ref 135–146)
WBC # BLD: 8.02 K/UL — SIGNIFICANT CHANGE UP (ref 4.8–10.8)
WBC # FLD AUTO: 8.02 K/UL — SIGNIFICANT CHANGE UP (ref 4.8–10.8)

## 2024-02-08 PROCEDURE — 99232 SBSQ HOSP IP/OBS MODERATE 35: CPT | Mod: 57

## 2024-02-08 PROCEDURE — 71045 X-RAY EXAM CHEST 1 VIEW: CPT | Mod: 26

## 2024-02-08 PROCEDURE — 93306 TTE W/DOPPLER COMPLETE: CPT | Mod: 26

## 2024-02-08 RX ADMIN — CLOPIDOGREL BISULFATE 75 MILLIGRAM(S): 75 TABLET, FILM COATED ORAL at 12:40

## 2024-02-08 RX ADMIN — Medication 81 MILLIGRAM(S): at 12:40

## 2024-02-08 RX ADMIN — POLYETHYLENE GLYCOL 3350 17 GRAM(S): 17 POWDER, FOR SOLUTION ORAL at 12:40

## 2024-02-08 RX ADMIN — Medication 112 MICROGRAM(S): at 05:10

## 2024-02-08 RX ADMIN — Medication 250 MILLIGRAM(S): at 17:32

## 2024-02-08 RX ADMIN — CHLORHEXIDINE GLUCONATE 1 APPLICATION(S): 213 SOLUTION TOPICAL at 21:11

## 2024-02-08 RX ADMIN — Medication 250 MILLIGRAM(S): at 05:18

## 2024-02-08 RX ADMIN — Medication 50 MILLIGRAM(S): at 05:18

## 2024-02-08 RX ADMIN — Medication 40 MILLIGRAM(S): at 05:10

## 2024-02-08 RX ADMIN — Medication 1 TABLET(S): at 12:40

## 2024-02-08 RX ADMIN — Medication 6 MILLIGRAM(S): at 05:10

## 2024-02-08 RX ADMIN — Medication 6 MILLIGRAM(S): at 17:52

## 2024-02-08 RX ADMIN — Medication 1 MILLIGRAM(S): at 12:40

## 2024-02-08 NOTE — SWALLOW BEDSIDE ASSESSMENT ADULT - SLP PERTINENT HISTORY OF CURRENT PROBLEM
Status post transcatheter mitral valve repair with an [id number] repair requiring a single MitraClip. Did very well from her surgical procedure and had significant reduction in her mitral regurgitation per MD report. 2 hours post op, pt w/ +hemoptysis and she was taken back to the operating room w/ Dr. Eric Lovelace for a bronchoscopy which showed a slight abrasion on the trachea without full-thickness injury and bleeding controlled with an epinephrine solution.

## 2024-02-08 NOTE — PROGRESS NOTE ADULT - NS ATTEND AMEND GEN_ALL_CORE FT
Patient seen and examined on morning rounds as described    Patient is postop day 1  Status post transcatheter mitral valve repair with an hpas-ru-vtem repair requiring a single MitraClip    Did very well from her surgical procedure and had significant reduction in her mitral regurgitation.    Unfortunately postoperatively she had some hemoptysis and she was taken back to the operating room by my colleague Dr. Eric Lovelace for a bronchoscopy which showed a slight abrasion on the trachea without full-thickness injury and we were able to control this bleeding with an epinephrine solution.    Since then the patient has done well and continues to do well.    She tells me that her breathing is much improved.    Hemodynamically she has been stable  Neurologically intact    Groin is clean without any hematomas    Overall she is making an excellent recovery    The patient, the family and the care team updated regarding the plan and progress

## 2024-02-08 NOTE — PHYSICAL THERAPY INITIAL EVALUATION ADULT - GENERAL OBSERVATIONS, REHAB EVAL
Femoral venous lines removed, on temporary bedrest. PT to f/u once pt is cleared for OOB with PT.
[id number] - [id number] Pt encountered ambulating independently on unit without AD, RN Jarrod following. PT escorted pt back to room - offered stair training however pt reported no concerns. Pt reports chronic mild weakness in LE over course of ~last 2 years.

## 2024-02-08 NOTE — PROGRESS NOTE ADULT - SUBJECTIVE AND OBJECTIVE BOX
OPERATIVE PROCEDURE(s): Mitral clip               POD #  1                     SURGEON(s): NAM Cardenas      SUBJECTIVE ASSESSMENT:83yFemale patient seen and examined at bedside. No complaints at this time.     Vital Signs Last 24 Hrs  T(F): 98.2 (08 Feb 2024 08:00), Max: 98.2 (08 Feb 2024 08:00)  HR: 79 (08 Feb 2024 08:00) (76 - 107)  BP: 120/74 (07 Feb 2024 16:30) (120/74 - 120/74)  ABP: 123/51 (08 Feb 2024 08:00) (95/49 - 148/77)  ABP(mean): 78 (08 Feb 2024 08:00)  RR: 28 (08 Feb 2024 08:00) (0 - 42)  SpO2: 100% (08 Feb 2024 08:00) (91% - 100%)      I&O's Detail    07 Feb 2024 07:01  -  08 Feb 2024 07:00  --------------------------------------------------------  IN:    IV PiggyBack: 600 mL  Total IN: 600 mL    OUT:    Indwelling Catheter - Urethral (mL): 550 mL  Total OUT: 550 mL        Net: I&O's Detail    07 Feb 2024 07:01  -  08 Feb 2024 07:00  --------------------------------------------------------  Total NET: 50 mL        CAPILLARY BLOOD GLUCOSE      POCT Blood Glucose.: 151 mg/dL (07 Feb 2024 15:12)    A1C with Estimated Average Glucose Result: 5.2 % (01-25-24 @ 09:51)  A1C with Estimated Average Glucose Result: 5.9 % (11-06-23 @ 09:00)  A1C with Estimated Average Glucose Result: 5.9 % (10-04-23 @ 07:59)      Physical Exam:  General: NAD; A&Ox3  Neuro: pupils equal and reactive, speech clear, no overt sensory or motor deficts  Cardiac: S1/S2, RRR, no murmur, no rubs  Lungs: unlabored respirations, CTA b/l, no wheeze, no rales, no crackles  Abdomen: Soft/NT/ND; positive bowel sounds x 4  Sternum: Intact, no click, incision healing well with no drainage  Incisions: Incisions clean/dry/intact  Extremities: No edema b/l lower extremities; good capillary refill; no cyanosis; palpable 1+ pedal pulses b/l    Central Venous Catheter: Yes: critical illness, intravenous access  Day #  Hdez Catheter: Yes: critical illness; monitor strict i/o's                    Day #  EPICARDIAL WIRES:  YES                                                                         Day #  BOWEL MOVEMENT:  [] YES [] NO, If No, Timing since last BM Day #  CHEST TUBE(MS/Left/Right):  [] YES [] NO, If yes -  AIR LEAKS:  YES/NO        LABS:                        10.0<L>  8.02  )-----------( 194      ( 08 Feb 2024 04:55 )             31.7<L>                        10.9<L>  8.99  )-----------( 210      ( 07 Feb 2024 15:47 )             34.5<L>    02-08    136  |  101  |  21<H>  ----------------------------<  146<H>  4.3   |  28  |  0.7  02-07    140  |  102  |  23<H>  ----------------------------<  140<H>  4.2   |  24  |  0.8    Ca    8.7      08 Feb 2024 04:55  Mg     2.4     02-08    TPro  6.3 [6.0 - 8.0]  /  Alb  3.5 [3.5 - 5.2]  /  TBili  0.5 [0.2 - 1.2]  /  DBili  x   /  AST  20 [0 - 41]  /  ALT  13 [0 - 41]  /  AlkPhos  108 [30 - 115]  02-08    PT/INR - ( 07 Feb 2024 15:47 )   PT: ;   INR: 1.38 ratio         PT/INR - ( 07 Feb 2024 10:40 )   PT: ;   INR: 1.19 ratio         PTT - ( 07 Feb 2024 15:47 )  PTT:>200 sec, PTT - ( 07 Feb 2024 10:40 )  PTT:54.6 sec  Urinalysis Basic - ( 08 Feb 2024 04:55 )    Color: x / Appearance: x / SG: x / pH: x  Gluc: 146 mg/dL / Ketone: x  / Bili: x / Urobili: x   Blood: x / Protein: x / Nitrite: x   Leuk Esterase: x / RBC: x / WBC x   Sq Epi: x / Non Sq Epi: x / Bacteria: x      ABG - ( 08 Feb 2024 03:58 )  pH: 7.40  /  pCO2: 45    /  pO2: 218   / HCO3: 28    / Base Excess: 2.7   /  SaO2: 99.2  /  LA: 1.1          RADIOLOGY & ADDITIONAL TESTS:  CXR:   EKG:    Allergies    penicillins (Rash)    Intolerances      MEDICATIONS  (STANDING):  aspirin enteric coated 81 milliGRAM(s) Oral daily  chlorhexidine 2% Cloths 1 Application(s) Topical daily  clopidogrel Tablet 75 milliGRAM(s) Oral daily  dexAMETHasone  Injectable 6 milliGRAM(s) IV Push every 12 hours  folic acid 1 milliGRAM(s) Oral daily  furosemide   Injectable 40 milliGRAM(s) IV Push daily  levothyroxine 112 MICROGram(s) Oral daily  metoprolol succinate ER 50 milliGRAM(s) Oral daily  multivitamin 1 Tablet(s) Oral daily  pantoprazole    Tablet 40 milliGRAM(s) Oral before breakfast  polyethylene glycol 3350 17 Gram(s) Oral daily  sodium chloride 0.9%. 1000 milliLiter(s) (20 mL/Hr) IV Continuous <Continuous>  vancomycin  IVPB 1000 milliGRAM(s) IV Intermittent every 12 hours    MEDICATIONS  (PRN):  acetaminophen     Tablet .. 650 milliGRAM(s) Oral every 6 hours PRN Temp greater or equal to 38C (100.4F), Mild Pain (1 - 3)  ondansetron Injectable 4 milliGRAM(s) IV Push every 6 hours PRN Nausea and/or Vomiting  oxyCODONE    IR 5 milliGRAM(s) Oral every 6 hours PRN Moderate Pain (4 - 6)    Home Medications:  folic acid 1 mg oral tablet: 1 tab(s) orally once a day (07 Feb 2024 09:54)  Lasix 40 mg oral tablet: 1 tab(s) orally once a day (07 Feb 2024 09:53)  Multiple Vitamins oral capsule: 1 cap(s) orally once a day (07 Feb 2024 09:23)      Pharmacologic DVT Prophylaxis [] YES, [] NO: Contraindication:  SCD's: YES b/l    GI Prophylaxis:     Post-Op Beta-Blockers: [] Yes, [] No: contraindication:  Post-Op CCB: [] Yes, [] No: contraindication:  Post-Op Aspirin:  [] Yes, [] No: contraindication:  Post-Op Statin:  [] Yes, [] No: contraindication:    Ambulation/Activity Status:    Assessment/Plan:  83y Female status-post  - Case and plan discussed with CTU Intensivist and CT Surgeon - Dr. Chou/Thang/Theresa/Guilherme  - Continue CTU supportive care and ongoing plan of care as per continuing CTU rounds.   - Continue DVT/GI prophylaxis  - Incentive Spirometry 10 times an hour  - Continue to advance physical activity as tolerated and continue PT/OT as directed  1. CAD s/p CABG: Continue ASA, statin, BB  2. HTN:   3. Post-op A. Fib ppx:   4. COPD/Hypoxia:   5. DM/Glucose Control:     Social Service Disposition:

## 2024-02-08 NOTE — PHYSICAL THERAPY INITIAL EVALUATION ADULT - SPECIFY REASON(S)
Pt presents independent with functional mobility without AD, can be d/c from b/s PT. Pt verbalized agreement.

## 2024-02-08 NOTE — SWALLOW BEDSIDE ASSESSMENT ADULT - SWALLOW EVAL: DIAGNOSIS
+dysphonic, +toleration of thin, puree and easy to chew solids w/o overt s/s of penetration/aspiration

## 2024-02-08 NOTE — PHYSICAL THERAPY INITIAL EVALUATION ADULT - SITTING BALANCE: STATIC
Pt is ready for discharge per Dr. Warner. Discussed med rec in details. Discussed follow-up care. Discussed stay. Gave several educational pamphlets. Gave scripts. Took vitals which were WDL. Took off IV, tele monitor, also removed wound vac and covered site with wet-dry dressing. Pt is driven home by [facility].     good balance

## 2024-02-09 ENCOUNTER — TRANSCRIPTION ENCOUNTER (OUTPATIENT)
Age: 84
End: 2024-02-09

## 2024-02-09 ENCOUNTER — NON-APPOINTMENT (OUTPATIENT)
Age: 84
End: 2024-02-09

## 2024-02-09 VITALS
HEART RATE: 83 BPM | OXYGEN SATURATION: 98 % | SYSTOLIC BLOOD PRESSURE: 109 MMHG | RESPIRATION RATE: 30 BRPM | TEMPERATURE: 97 F | DIASTOLIC BLOOD PRESSURE: 67 MMHG

## 2024-02-09 LAB
ALBUMIN SERPL ELPH-MCNC: 3.7 G/DL — SIGNIFICANT CHANGE UP (ref 3.5–5.2)
ALP SERPL-CCNC: 104 U/L — SIGNIFICANT CHANGE UP (ref 30–115)
ALT FLD-CCNC: 14 U/L — SIGNIFICANT CHANGE UP (ref 0–41)
ANION GAP SERPL CALC-SCNC: 8 MMOL/L — SIGNIFICANT CHANGE UP (ref 7–14)
AST SERPL-CCNC: 21 U/L — SIGNIFICANT CHANGE UP (ref 0–41)
BASOPHILS # BLD AUTO: 0.01 K/UL — SIGNIFICANT CHANGE UP (ref 0–0.2)
BASOPHILS NFR BLD AUTO: 0.1 % — SIGNIFICANT CHANGE UP (ref 0–1)
BILIRUB SERPL-MCNC: 0.4 MG/DL — SIGNIFICANT CHANGE UP (ref 0.2–1.2)
BUN SERPL-MCNC: 23 MG/DL — HIGH (ref 10–20)
CALCIUM SERPL-MCNC: 9 MG/DL — SIGNIFICANT CHANGE UP (ref 8.4–10.5)
CHLORIDE SERPL-SCNC: 101 MMOL/L — SIGNIFICANT CHANGE UP (ref 98–110)
CO2 SERPL-SCNC: 28 MMOL/L — SIGNIFICANT CHANGE UP (ref 17–32)
CREAT SERPL-MCNC: 0.7 MG/DL — SIGNIFICANT CHANGE UP (ref 0.7–1.5)
EGFR: 85 ML/MIN/1.73M2 — SIGNIFICANT CHANGE UP
EOSINOPHIL # BLD AUTO: 0.03 K/UL — SIGNIFICANT CHANGE UP (ref 0–0.7)
EOSINOPHIL NFR BLD AUTO: 0.3 % — SIGNIFICANT CHANGE UP (ref 0–8)
GLUCOSE SERPL-MCNC: 133 MG/DL — HIGH (ref 70–99)
HCT VFR BLD CALC: 32.3 % — LOW (ref 37–47)
HGB BLD-MCNC: 10.2 G/DL — LOW (ref 12–16)
IMM GRANULOCYTES NFR BLD AUTO: 0.4 % — HIGH (ref 0.1–0.3)
LYMPHOCYTES # BLD AUTO: 0.75 K/UL — LOW (ref 1.2–3.4)
LYMPHOCYTES # BLD AUTO: 6.4 % — LOW (ref 20.5–51.1)
MAGNESIUM SERPL-MCNC: 2.5 MG/DL — HIGH (ref 1.8–2.4)
MCHC RBC-ENTMCNC: 30 PG — SIGNIFICANT CHANGE UP (ref 27–31)
MCHC RBC-ENTMCNC: 31.6 G/DL — LOW (ref 32–37)
MCV RBC AUTO: 95 FL — SIGNIFICANT CHANGE UP (ref 81–99)
MONOCYTES # BLD AUTO: 0.5 K/UL — SIGNIFICANT CHANGE UP (ref 0.1–0.6)
MONOCYTES NFR BLD AUTO: 4.3 % — SIGNIFICANT CHANGE UP (ref 1.7–9.3)
NEUTROPHILS # BLD AUTO: 10.39 K/UL — HIGH (ref 1.4–6.5)
NEUTROPHILS NFR BLD AUTO: 88.5 % — HIGH (ref 42.2–75.2)
NRBC # BLD: 0 /100 WBCS — SIGNIFICANT CHANGE UP (ref 0–0)
PLATELET # BLD AUTO: 180 K/UL — SIGNIFICANT CHANGE UP (ref 130–400)
PMV BLD: 11.1 FL — HIGH (ref 7.4–10.4)
POTASSIUM SERPL-MCNC: 4.6 MMOL/L — SIGNIFICANT CHANGE UP (ref 3.5–5)
POTASSIUM SERPL-SCNC: 4.6 MMOL/L — SIGNIFICANT CHANGE UP (ref 3.5–5)
PROT SERPL-MCNC: 6.5 G/DL — SIGNIFICANT CHANGE UP (ref 6–8)
RBC # BLD: 3.4 M/UL — LOW (ref 4.2–5.4)
RBC # FLD: 15.5 % — HIGH (ref 11.5–14.5)
SODIUM SERPL-SCNC: 137 MMOL/L — SIGNIFICANT CHANGE UP (ref 135–146)
WBC # BLD: 11.73 K/UL — HIGH (ref 4.8–10.8)
WBC # FLD AUTO: 11.73 K/UL — HIGH (ref 4.8–10.8)

## 2024-02-09 PROCEDURE — 71045 X-RAY EXAM CHEST 1 VIEW: CPT | Mod: 26

## 2024-02-09 PROCEDURE — 93010 ELECTROCARDIOGRAM REPORT: CPT

## 2024-02-09 RX ORDER — ACETAMINOPHEN 500 MG
2 TABLET ORAL
Qty: 0 | Refills: 0 | DISCHARGE
Start: 2024-02-09

## 2024-02-09 RX ADMIN — Medication 1 TABLET(S): at 11:53

## 2024-02-09 RX ADMIN — Medication 10 MILLIGRAM(S): at 10:59

## 2024-02-09 RX ADMIN — Medication 40 MILLIGRAM(S): at 05:08

## 2024-02-09 RX ADMIN — CHLORHEXIDINE GLUCONATE 1 APPLICATION(S): 213 SOLUTION TOPICAL at 11:05

## 2024-02-09 RX ADMIN — Medication 1 MILLIGRAM(S): at 11:03

## 2024-02-09 RX ADMIN — Medication 81 MILLIGRAM(S): at 11:04

## 2024-02-09 RX ADMIN — OXYCODONE HYDROCHLORIDE 5 MILLIGRAM(S): 5 TABLET ORAL at 09:20

## 2024-02-09 RX ADMIN — POLYETHYLENE GLYCOL 3350 17 GRAM(S): 17 POWDER, FOR SOLUTION ORAL at 11:03

## 2024-02-09 RX ADMIN — OXYCODONE HYDROCHLORIDE 5 MILLIGRAM(S): 5 TABLET ORAL at 08:51

## 2024-02-09 RX ADMIN — CLOPIDOGREL BISULFATE 75 MILLIGRAM(S): 75 TABLET, FILM COATED ORAL at 11:03

## 2024-02-09 RX ADMIN — Medication 250 MILLIGRAM(S): at 05:08

## 2024-02-09 RX ADMIN — Medication 112 MICROGRAM(S): at 05:08

## 2024-02-09 RX ADMIN — Medication 50 MILLIGRAM(S): at 05:10

## 2024-02-09 RX ADMIN — PANTOPRAZOLE SODIUM 40 MILLIGRAM(S): 20 TABLET, DELAYED RELEASE ORAL at 05:08

## 2024-02-09 NOTE — PROGRESS NOTE ADULT - SUBJECTIVE AND OBJECTIVE BOX
OPERATIVE PROCEDURE(s):                POD #   2                    83yFemale  SURGEON(s): NAM Cardenas  SUBJECTIVE ASSESSMENT:   Vital Signs Last 24 Hrs  T(F): 96.8 (09 Feb 2024 08:00), Max: 97.6 (08 Feb 2024 12:00)  HR: 76 (09 Feb 2024 09:00) (75 - 106)  BP: 99/50 (09 Feb 2024 09:00) (90/49 - 152/72)  BP(mean): 70 (09 Feb 2024 09:00) (65 - 102)  ABP: 119/52 (08 Feb 2024 12:00) (118/56 - 120/49)  ABP(mean): 77 (08 Feb 2024 12:00)  RR: 28 (09 Feb 2024 09:00) (13 - 59)  SpO2: 98% (09 Feb 2024 09:00) (93% - 100%) RA      I&O's Detail    08 Feb 2024 07:01  -  09 Feb 2024 07:00  --------------------------------------------------------  IN:    IV PiggyBack: 250 mL    Oral Fluid: 600 mL  Total IN: 850 mL    OUT:    Indwelling Catheter - Urethral (mL): 1200 mL    Voided (mL): 1350 mL  Total OUT: 2550 mL        Net:   I&O's Detail    07 Feb 2024 07:01  -  08 Feb 2024 07:00  --------------------------------------------------------  Total NET: 50 mL      08 Feb 2024 07:01  -  09 Feb 2024 07:00  --------------------------------------------------------  Total NET: -1700 mL        CAPILLARY BLOOD GLUCOSE        Physical Exam:  General: NAD; A&Ox3/Patient is intubated and sedated  Cardiac: S1/S2, RRR, no murmur, no rubs  Lungs: unlabored respirations, CTA b/l, no wheeze, no rales, no crackles  Abdomen: Soft/NT/ND; positive bowel sounds x 4  Sternum: Intact, no click, incision healing well with no drainage  Incisions: Incisions clean/dry/intact  Extremities: No edema b/l lower extremities; good capillary refill; no cyanosis; palpable 1+ pedal pulses b/l    Central Venous Catheter: Yes[]  No[] , If Yes indication:           Day #  Hdez Catheter: Yes  [] , No  [] , If yes indication:                      Day #  NGT: Yes [] No [] ,    If Yes Placement:                                     Day #  EPICARDIAL WIRES:  [] YES [] NO                                              Day #  BOWEL MOVEMENT:  [] YES [] NO, If No, Timing since last BM:      Day #  CHEST TUBE(Left/Right):  [] YES [] NO, If yes -  AIR LEAKS:  [] YES [] NO        LABS:                        10.2<L>  11.73<H> )-----------( 180      ( 09 Feb 2024 04:15 )             32.3<L>                        10.0<L>  8.02  )-----------( 194      ( 08 Feb 2024 04:55 )             31.7<L>    02-09    137  |  101  |  23<H>  ----------------------------<  133<H>  4.6   |  28  |  0.7  02-08    136  |  101  |  21<H>  ----------------------------<  146<H>  4.3   |  28  |  0.7    Ca    9.0      09 Feb 2024 04:15  Mg     2.5     02-09    TPro  6.5 [6.0 - 8.0]  /  Alb  3.7 [3.5 - 5.2]  /  TBili  0.4 [0.2 - 1.2]  /  DBili  x   /  AST  21 [0 - 41]  /  ALT  14 [0 - 41]  /  AlkPhos  104 [30 - 115]  02-09    PT/INR - ( 07 Feb 2024 15:47 )   PT: ;   INR: 1.38 ratio         PT/INR - ( 07 Feb 2024 10:40 )   PT: ;   INR: 1.19 ratio         PTT - ( 07 Feb 2024 15:47 )  PTT:>200 sec, PTT - ( 07 Feb 2024 10:40 )  PTT:54.6 sec  Urinalysis Basic - ( 09 Feb 2024 04:15 )    Color: x / Appearance: x / SG: x / pH: x  Gluc: 133 mg/dL / Ketone: x  / Bili: x / Urobili: x   Blood: x / Protein: x / Nitrite: x   Leuk Esterase: x / RBC: x / WBC x   Sq Epi: x / Non Sq Epi: x / Bacteria: x      ABG - ( 08 Feb 2024 03:58 )  pH: 7.40  /  pCO2: 45    /  pO2: 218   / HCO3: 28    / Base Excess: 2.7   /  SaO2: 99.2  /  LA: 1.1              RADIOLOGY & ADDITIONAL TESTS:  CXR:  EKG:  MEDICATIONS  (STANDING):  aspirin enteric coated 81 milliGRAM(s) Oral daily  chlorhexidine 2% Cloths 1 Application(s) Topical daily  clopidogrel Tablet 75 milliGRAM(s) Oral daily  dexMEDEtomidine Infusion 0.25 MICROgram(s)/kG/Hr (5.67 mL/Hr) IV Continuous <Continuous>  folic acid 1 milliGRAM(s) Oral daily  furosemide   Injectable 40 milliGRAM(s) IV Push daily  levothyroxine 112 MICROGram(s) Oral daily  metoprolol succinate ER 50 milliGRAM(s) Oral daily  multivitamin 1 Tablet(s) Oral daily  niCARdipine Infusion 5 mG/Hr (25 mL/Hr) IV Continuous <Continuous>  nitroglycerin  Infusion 5 MICROgram(s)/Min (1.5 mL/Hr) IV Continuous <Continuous>  norepinephrine Infusion 0.05 MICROgram(s)/kG/Min (8.5 mL/Hr) IV Continuous <Continuous>  pantoprazole    Tablet 40 milliGRAM(s) Oral before breakfast  polyethylene glycol 3350 17 Gram(s) Oral daily  sodium chloride 0.9%. 1000 milliLiter(s) (20 mL/Hr) IV Continuous <Continuous>    MEDICATIONS  (PRN):  acetaminophen     Tablet .. 650 milliGRAM(s) Oral every 6 hours PRN Temp greater or equal to 38C (100.4F), Mild Pain (1 - 3)  ondansetron Injectable 4 milliGRAM(s) IV Push every 6 hours PRN Nausea and/or Vomiting  oxyCODONE    IR 5 milliGRAM(s) Oral every 6 hours PRN Moderate Pain (4 - 6)    HEPARIN:  [] YES [] NO  Dose: XX UNITS/HR UNITS Q8H  LOVENOX:[] YES [] NO  Dose: XX mg Q24H  COUMADIN: []  YES [] NO  Dose: XX mg  Q24H  SCD's: YES b/l  GI Prophylaxis: Protonix [], Pepcid [], None [], (Contra-indication:.....)    Post-Op Beta-Blockers: Yes [], No[], If No, then contraindication:  Post-Op Aspirin: Yes [],  No [], If No, then contraindication:  Post-Op Statin: Yes [], No[], If No, then contraindication:  Allergies    penicillins (Rash)    Intolerances      Ambulation/Activity Status:    Assessment/Plan:  83y Female status-post .....  - Case and plan discussed with CTU Intensivist and CT Surgeon - Dr. Chang/Thang/Theresa  - Continue CTU supportive care    - Continue DVT/GI prophylaxis  - Incentive Spirometry 10 times an hour  - Continue to advance physical activity as tolerated and continue PT/OT as directed  1. CAD: Continue ASA, statin, BB  2. HTN:   3. A. Fib:   4. COPD/Hypoxia:   5. DM/Glucose Control:     Social Service Disposition:     OPERATIVE PROCEDURE(s):        mitral clip/ return to OR for tracheal laceration s/p epi injection        POD #   2                    83yFemale  SURGEON(s): NAM Cardenas  SUBJECTIVE ASSESSMENT: pt seen and examined. no acute complaints   Vital Signs Last 24 Hrs  T(F): 96.8 (09 Feb 2024 08:00), Max: 97.6 (08 Feb 2024 12:00)  HR: 76 (09 Feb 2024 09:00) (75 - 106)  BP: 99/50 (09 Feb 2024 09:00) (90/49 - 152/72)  BP(mean): 70 (09 Feb 2024 09:00) (65 - 102)  ABP: 119/52 (08 Feb 2024 12:00) (118/56 - 120/49)  ABP(mean): 77 (08 Feb 2024 12:00)  RR: 28 (09 Feb 2024 09:00) (13 - 59)  SpO2: 98% (09 Feb 2024 09:00) (93% - 100%) RA      I&O's Detail    08 Feb 2024 07:01  -  09 Feb 2024 07:00  --------------------------------------------------------  IN:    IV PiggyBack: 250 mL    Oral Fluid: 600 mL  Total IN: 850 mL    OUT:    Indwelling Catheter - Urethral (mL): 1200 mL    Voided (mL): 1350 mL  Total OUT: 2550 mL        Net:   I&O's Detail    07 Feb 2024 07:01  -  08 Feb 2024 07:00  --------------------------------------------------------  Total NET: 50 mL      08 Feb 2024 07:01  -  09 Feb 2024 07:00  --------------------------------------------------------  Total NET: -1700 mL        CAPILLARY BLOOD GLUCOSE        Physical Exam:  General: NAD; A&Ox3, no focal deficits, clear speech   Cardiac: S1/S2, RRR, no murmur, no rubs  Lungs: unlabored respirations, CTA b/l, no wheeze, no rales, no crackles  Abdomen: Soft/NT/ND; positive bowel sounds x 4  Incisions: Incisions clean/dry/intact  Extremities: No edema b/l lower extremities; good capillary refill; no cyanosis; palpable 1+ pedal pulses b/l      BOWEL MOVEMENT:  [x] YES [] NO, If No, Timing since last BM:      Day #          LABS:                        10.2<L>  11.73<H> )-----------( 180      ( 09 Feb 2024 04:15 )             32.3<L>                        10.0<L>  8.02  )-----------( 194      ( 08 Feb 2024 04:55 )             31.7<L>    02-09    137  |  101  |  23<H>  ----------------------------<  133<H>  4.6   |  28  |  0.7  02-08    136  |  101  |  21<H>  ----------------------------<  146<H>  4.3   |  28  |  0.7    Ca    9.0      09 Feb 2024 04:15  Mg     2.5     02-09    TPro  6.5 [6.0 - 8.0]  /  Alb  3.7 [3.5 - 5.2]  /  TBili  0.4 [0.2 - 1.2]  /  DBili  x   /  AST  21 [0 - 41]  /  ALT  14 [0 - 41]  /  AlkPhos  104 [30 - 115]  02-09    PT/INR - ( 07 Feb 2024 15:47 )   PT: ;   INR: 1.38 ratio         PT/INR - ( 07 Feb 2024 10:40 )   PT: ;   INR: 1.19 ratio         PTT - ( 07 Feb 2024 15:47 )  PTT:>200 sec, PTT - ( 07 Feb 2024 10:40 )  PTT:54.6 sec  Urinalysis Basic - ( 09 Feb 2024 04:15 )    Color: x / Appearance: x / SG: x / pH: x  Gluc: 133 mg/dL / Ketone: x  / Bili: x / Urobili: x   Blood: x / Protein: x / Nitrite: x   Leuk Esterase: x / RBC: x / WBC x   Sq Epi: x / Non Sq Epi: x / Bacteria: x      ABG - ( 08 Feb 2024 03:58 )  pH: 7.40  /  pCO2: 45    /  pO2: 218   / HCO3: 28    / Base Excess: 2.7   /  SaO2: 99.2  /  LA: 1.1              RADIOLOGY & ADDITIONAL TESTS:  CXR: < from: Xray Chest 1 View- PORTABLE-Routine (02.09.24 @ 06:15) >  Impression:    Small left basilar opacity, possibly atelectasis.    < end of copied text >    EKG: < from: 12 Lead ECG (02.09.24 @ 07:13) >  Ventricular Rate 66 BPM    Atrial Rate 56 BPM    QRS Duration 138 ms    Q-T Interval 458 ms    QTC Calculation(Bazett) 480 ms    R Axis 158 degrees    T Axis 26 degrees    Diagnosis Line Atrial fibrillation  Right axis deviation  Non-specific intra-ventricular conduction block  Abnormal ECG    < end of copied text >    MEDICATIONS  (STANDING):  aspirin enteric coated 81 milliGRAM(s) Oral daily  chlorhexidine 2% Cloths 1 Application(s) Topical daily  clopidogrel Tablet 75 milliGRAM(s) Oral daily  dexMEDEtomidine Infusion 0.25 MICROgram(s)/kG/Hr (5.67 mL/Hr) IV Continuous <Continuous>  folic acid 1 milliGRAM(s) Oral daily  furosemide   Injectable 40 milliGRAM(s) IV Push daily  levothyroxine 112 MICROGram(s) Oral daily  metoprolol succinate ER 50 milliGRAM(s) Oral daily  multivitamin 1 Tablet(s) Oral daily  niCARdipine Infusion 5 mG/Hr (25 mL/Hr) IV Continuous <Continuous>  nitroglycerin  Infusion 5 MICROgram(s)/Min (1.5 mL/Hr) IV Continuous <Continuous>  norepinephrine Infusion 0.05 MICROgram(s)/kG/Min (8.5 mL/Hr) IV Continuous <Continuous>  pantoprazole    Tablet 40 milliGRAM(s) Oral before breakfast  polyethylene glycol 3350 17 Gram(s) Oral daily  sodium chloride 0.9%. 1000 milliLiter(s) (20 mL/Hr) IV Continuous <Continuous>    MEDICATIONS  (PRN):  acetaminophen     Tablet .. 650 milliGRAM(s) Oral every 6 hours PRN Temp greater or equal to 38C (100.4F), Mild Pain (1 - 3)  ondansetron Injectable 4 milliGRAM(s) IV Push every 6 hours PRN Nausea and/or Vomiting  oxyCODONE    IR 5 milliGRAM(s) Oral every 6 hours PRN Moderate Pain (4 - 6)      SCD's: YES b/l  GI Prophylaxis: Protonix [x, Pepcid [], None [], (Contra-indication:.....)      Allergies    penicillins (Rash)    Intolerances      Ambulation/Activity Status: ambulate     Assessment/Plan:  83y Female status-post s/p mitral clip/ return to OR for tracheal laceration s/p epi injection  - Case and plan discussed with CTU Intensivist and CT Surgeon - Dr. Cardenas  - Continue CTU supportive care    - Continue DVT/GI prophylaxis  - Incentive Spirometry 10 times an hour  - Continue to advance physical activity as tolerated and continue PT/OT as directed  1. cont asa and plavix, toprol  2. cont daily lasix   3. hypothyroidism- cont synthroid     Social Service Disposition:  home today

## 2024-02-09 NOTE — DISCHARGE NOTE NURSING/CASE MANAGEMENT/SOCIAL WORK - PATIENT PORTAL LINK FT
You can access the FollowMyHealth Patient Portal offered by [facility] by registering at the following website: [email or web address]. By joining [facility]’s FollowMyHealth portal, you will also be able to view your health information using other applications (apps) compatible with our system.

## 2024-02-15 ENCOUNTER — APPOINTMENT (OUTPATIENT)
Dept: CARDIOLOGY | Facility: CLINIC | Age: 84
End: 2024-02-15
Payer: MEDICARE
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
## 2024-02-15 DIAGNOSIS — E03.9 HYPOTHYROIDISM, UNSPECIFIED: ICD-10-CM

## 2024-02-15 DIAGNOSIS — I50.42 CHRONIC COMBINED SYSTOLIC (CONGESTIVE) AND DIASTOLIC (CONGESTIVE) HEART FAILURE: ICD-10-CM

## 2024-02-15 DIAGNOSIS — I11.0 HYPERTENSIVE HEART DISEASE WITH HEART FAILURE: ICD-10-CM

## 2024-02-15 DIAGNOSIS — I34.0 NONRHEUMATIC MITRAL (VALVE) INSUFFICIENCY: ICD-10-CM

## 2024-02-15 DIAGNOSIS — I25.10 ATHEROSCLEROTIC HEART DISEASE OF NATIVE CORONARY ARTERY WITHOUT ANGINA PECTORIS: ICD-10-CM

## 2024-02-15 DIAGNOSIS — Z95.5 PRESENCE OF CORONARY ANGIOPLASTY IMPLANT AND GRAFT: ICD-10-CM
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
## 2024-02-15 DIAGNOSIS — Z00.6 ENCOUNTER FOR EXAMINATION FOR NORMAL COMPARISON AND CONTROL IN CLINICAL RESEARCH PROGRAM: ICD-10-CM

## 2024-02-15 DIAGNOSIS — I27.20 PULMONARY HYPERTENSION, UNSPECIFIED: ICD-10-CM

## 2024-02-15 DIAGNOSIS — R04.2 HEMOPTYSIS: ICD-10-CM

## 2024-02-15 DIAGNOSIS — Z95.3 PRESENCE OF XENOGENIC HEART VALVE: ICD-10-CM

## 2024-02-15 DIAGNOSIS — S10.11XA ABRASION OF THROAT, INITIAL ENCOUNTER: ICD-10-CM

## 2024-02-15 DIAGNOSIS — K92.0 HEMATEMESIS: ICD-10-CM

## 2024-02-15 PROCEDURE — 99214 OFFICE O/P EST MOD 30 MIN: CPT

## 2024-02-15 RX ORDER — LEVOTHYROXINE SODIUM 0.12 MG/1
125 TABLET ORAL
Refills: 0 | Status: DISCONTINUED | COMMUNITY
Start: 2022-10-12 | End: 2024-02-15

## 2024-02-16 NOTE — PATIENT PROFILE ADULT - NSPROGENPREVTRANSF_GEN_A_NUR
-- DO NOT REPLY / DO NOT REPLY ALL --  -- Message is from Engagement Center Operations (ECO) --    ONLY TO BE USED WITHIN A REFILL MEDICATION ENCOUNTER    Med Refill  Is the patient currently having any symptoms?: No/Non-Emergent symptoms    Name of medication requested: See pended med    Is this the first request for the medication in the last 48 hours?: Yes      Patient is requesting a medication refill - medication is on active list    Full name of the provider who ordered the medication: Isabela Velásquez     [facility] site name / Account # for provider: Pediatric Neurology     Preferred Pharmacy: Pharmacy  [id number] - [place], [place] - [address]    Patient confirmed the above pharmacy as correct?  Yes    Caller Information         Type Contact Phone/Fax    02/16/2024 03:04 PM CST Phone (Incoming) Elyssa Brown (Father) [phone]            Alternative phone number: [phone] patients mother     Can a detailed message be left?: Yes         no

## 2024-02-17 NOTE — END OF VISIT
[FreeTextEntry3] : Clinically stable. Persistent exertional dyspnea since initial improvement post PCI and TAVR.  Moderate to severe MR (atrial functional) on BALDEMAR.  Amenable for MitraClip.  Hopefully, pulmonary pressure and TR will decrease with less MR (although annular dilatation also contributing to TR).  May consider TTVR if persistent / severe.  Discussed with patient / daughter.  Alysia wish to proceed with MitraClip.

## 2024-02-17 NOTE — ASSESSMENT
[FreeTextEntry1] : Plan: Mrs. Fernandes is a 84 y/o female well known to the structural Heart Team. She had Moderate to Severe MR, Severe Aortic Stenosis and Moderate TR. She underwent TAVR and was followed for her MR. Her MR did not improve despite medical optimization. The Heart Valve Team evaluated the images and plan is to proceed with Pilar clip.    #Mitral Regurgitation Moderate to Severe by Independant Evaluation BALDEMAR is Moderate to Severe Will nee repair via Pilar Clip NYHA class II Lives home alone no aid Euvolemic on Exam ON GDMT  #CHF On Farxiga continue Continue Spironolactone   #CAD Continue Plavix and Aspirin Continue Statin.

## 2024-02-17 NOTE — PHYSICAL EXAM
[Well Developed] : well developed [Well Nourished] : well nourished [No Acute Distress] : no acute distress [Normal Conjunctiva] : normal conjunctiva [No Carotid Bruit] : no carotid bruit [Normal Venous Pressure] : normal venous pressure [Normal S1, S2] : normal S1, S2 [Clear Lung Fields] : clear lung fields [Good Air Entry] : good air entry [No Respiratory Distress] : no respiratory distress  [Soft] : abdomen soft [Non Tender] : non-tender [No Masses/organomegaly] : no masses/organomegaly [Normal Bowel Sounds] : normal bowel sounds [Normal Gait] : normal gait [No Edema] : no edema [No Cyanosis] : no cyanosis [No Clubbing] : no clubbing [No Rash] : no rash [No Varicosities] : no varicosities [Moves all extremities] : moves all extremities [No Skin Lesions] : no skin lesions [No Focal Deficits] : no focal deficits [Normal Speech] : normal speech [Alert and Oriented] : alert and oriented [Normal memory] : normal memory [de-identified] : LLSB IV

## 2024-02-17 NOTE — REASON FOR VISIT
[Symptom and Test Evaluation] : symptom and test evaluation [Structural Heart and Valve Disease] : structural heart and valve disease [Family Member] : family member [FreeTextEntry1] : ASAD MENDOSA is a 83 year F. She arrives today to the Structural Office for evaluation of their mitral regurgitation. past medical history of GIB, A fib (off anticoagulation due to GIB), s/p Watchman device insertion, HTN, HLD, diverticulosis, non-erosive gastritis, aortic stenosis and hypothyroidism presented early October for elective TAVR due to progressive dyspnea and fatigue related to AS. On 10/11/23, she went to the cath lab for TAVR. Ms. MENDOSA was followed by the structural heart team. Her SOB has not improved since the first week of TAVR. She has moderate to severe MR. Imaging seen by hour Structural Echocardiographer who determined MR is more severe than moderate. Symptoms today are STRONG, fatigue. NYHA class II. Patient was examined. Shared Decision making was done with the patient using the STS risk score which was calculated and discussed with the patient. Surgery was recommended and all risk and alternatives to surgery were discussed with the patient.

## 2024-02-18 LAB
ALBUMIN SERPL ELPH-MCNC: 4 G/DL
ALP BLD-CCNC: 121 U/L
ALT SERPL-CCNC: 15 U/L
ANION GAP SERPL CALC-SCNC: 11 MMOL/L
AST SERPL-CCNC: 20 U/L
BILIRUB SERPL-MCNC: 0.4 MG/DL
BUN SERPL-MCNC: 21 MG/DL
CALCIUM SERPL-MCNC: 8.9 MG/DL
CHLORIDE SERPL-SCNC: 103 MMOL/L
CO2 SERPL-SCNC: 28 MMOL/L
CREAT SERPL-MCNC: 1 MG/DL
EGFR: 56 ML/MIN/1.73M2
FERRITIN SERPL-MCNC: 52 NG/ML
GLUCOSE SERPL-MCNC: 83 MG/DL
HCT VFR BLD CALC: 38.1 %
HGB BLD-MCNC: 11.3 G/DL
IRON SATN MFR SERPL: 10 %
IRON SERPL-MCNC: 44 UG/DL
MCHC RBC-ENTMCNC: 29 PG
MCHC RBC-ENTMCNC: 29.7 G/DL
MCV RBC AUTO: 97.7 FL
PLATELET # BLD AUTO: 287 K/UL
PMV BLD AUTO: 0 /100 WBCS
PMV BLD: 11.4 FL
POTASSIUM SERPL-SCNC: 4.2 MMOL/L
PROT SERPL-MCNC: 7.1 G/DL
RBC # BLD: 3.9 M/UL
RBC # FLD: 14.9 %
SODIUM SERPL-SCNC: 142 MMOL/L
TIBC SERPL-MCNC: 425 UG/DL
UIBC SERPL-MCNC: 381 UG/DL
WBC # FLD AUTO: 5.48 K/UL

## 2024-02-18 NOTE — ADDENDUM
[FreeTextEntry1] : Patient written for home care services s/p Pilar clip for severe MR. Also needs physcial therapy evaluation to improve deconditioning post hospitalization. Patient also had TAVR 2 months ago.

## 2024-02-18 NOTE — END OF VISIT
[FreeTextEntry3] : Status post successful MitraClip (2 grade MR reduction). Procedure complicated by traumatic intubation with tracheal laceration status post bronchoscopy with treatment.  Doing well post procedure.  Breathing significantly improved. Cough improving.  Metoprolol increased.  BP slightly low.  Feels a bit fatigued.  Heart rate controlled.  Decrease Toprol back to 50 mg daily. Continue aspirin and Plavix. Continue other cardiac medication. Follow-up with Dr. Hardy. Follow-up valve clinic 1 month.

## 2024-02-18 NOTE — ASSESSMENT
[FreeTextEntry1] : Plan: #S/P Mitraclip Drop Metoprolol to 50mg po qd Labs today Needs reconditioning Continue diuretics

## 2024-03-06 NOTE — ASU PATIENT PROFILE, ADULT - NS PRO LAST MENSTRUAL
Discussed ice/heat, elevation and recovery time from injury  Pt is allergic to ibuprofen-do not take nsaids  Tyelnol for pain-has prescription/s from Dr Guerrero for norco   Please call if symptoms worsen or are not resolving.  Discussed w pt red flag symptoms that if they occur, pt should immediately go to ER. Pt states understanding.    
Stop losartan  Start telmisartan-amlodipine  40/5 mg I po q d  Check blood pressure twice per day with arm cuff bp monitor for 2 weeks  Record date, time, blood pressure and pulse reading  Send in copy of bp log to me via [employer] or you may call into nurse triage staff  Call if bp consistently > 140/86  Go to ER if any severe headache, chest pain, shortness of breath, visual changes  Please let me know if you have any questions or concerns.       
not applicable

## 2024-03-07 ENCOUNTER — RX RENEWAL (OUTPATIENT)
Age: 84
End: 2024-03-07

## 2024-03-14 ENCOUNTER — RX RENEWAL (OUTPATIENT)
Age: 84
End: 2024-03-14

## 2024-03-21 ENCOUNTER — APPOINTMENT (OUTPATIENT)
Dept: CARDIOLOGY | Facility: CLINIC | Age: 84
End: 2024-03-21
Payer: MEDICARE

## 2024-03-21 ENCOUNTER — RX RENEWAL (OUTPATIENT)
Age: 84
End: 2024-03-21

## 2024-03-21 VITALS
HEIGHT: 63 IN | HEART RATE: 84 BPM | WEIGHT: 200 LBS | OXYGEN SATURATION: 96 % | BODY MASS INDEX: 35.44 KG/M2 | DIASTOLIC BLOOD PRESSURE: 60 MMHG | RESPIRATION RATE: 15 BRPM | SYSTOLIC BLOOD PRESSURE: 118 MMHG | TEMPERATURE: 98.1 F

## 2024-03-21 DIAGNOSIS — Z98.890 OTHER SPECIFIED POSTPROCEDURAL STATES: ICD-10-CM

## 2024-03-21 DIAGNOSIS — I34.0 NONRHEUMATIC MITRAL (VALVE) INSUFFICIENCY: ICD-10-CM

## 2024-03-21 DIAGNOSIS — I48.19 OTHER PERSISTENT ATRIAL FIBRILLATION: ICD-10-CM

## 2024-03-21 DIAGNOSIS — I35.0 NONRHEUMATIC AORTIC (VALVE) STENOSIS: ICD-10-CM

## 2024-03-21 PROCEDURE — 93306 TTE W/DOPPLER COMPLETE: CPT

## 2024-03-21 PROCEDURE — 99214 OFFICE O/P EST MOD 30 MIN: CPT | Mod: 24

## 2024-03-21 RX ORDER — PANTOPRAZOLE 40 MG/1
40 TABLET, DELAYED RELEASE ORAL DAILY
Qty: 30 | Refills: 5 | Status: ACTIVE | COMMUNITY
Start: 2024-03-21 | End: 1900-01-01

## 2024-03-21 RX ORDER — DAPAGLIFLOZIN 5 MG/1
5 TABLET, FILM COATED ORAL DAILY
Qty: 30 | Refills: 3 | Status: ACTIVE | COMMUNITY
Start: 2024-03-21 | End: 1900-01-01

## 2024-03-21 RX ORDER — SPIRONOLACTONE 25 MG/1
25 TABLET ORAL
Refills: 0 | Status: DISCONTINUED | COMMUNITY
End: 2024-03-21

## 2024-03-21 RX ORDER — BENZONATATE 200 MG/1
200 CAPSULE ORAL 3 TIMES DAILY
Qty: 42 | Refills: 0 | Status: DISCONTINUED | COMMUNITY
Start: 2024-02-15 | End: 2024-03-21

## 2024-03-21 RX ORDER — DAPAGLIFLOZIN 10 MG/1
10 TABLET, FILM COATED ORAL
Refills: 0 | Status: DISCONTINUED | COMMUNITY
End: 2024-03-21

## 2024-03-21 RX ORDER — FOLIC ACID 1 MG/1
1 TABLET ORAL
Qty: 30 | Refills: 0 | Status: ACTIVE | COMMUNITY
Start: 2023-12-13 | End: 1900-01-01

## 2024-03-22 LAB
ALBUMIN SERPL ELPH-MCNC: 4 G/DL
ALP BLD-CCNC: 126 U/L
ALT SERPL-CCNC: 10 U/L
ANION GAP SERPL CALC-SCNC: 13 MMOL/L
AST SERPL-CCNC: 21 U/L
BILIRUB SERPL-MCNC: 0.5 MG/DL
BUN SERPL-MCNC: 23 MG/DL
CALCIUM SERPL-MCNC: 9.3 MG/DL
CHLORIDE SERPL-SCNC: 103 MMOL/L
CO2 SERPL-SCNC: 26 MMOL/L
CREAT SERPL-MCNC: 0.9 MG/DL
EGFR: 63 ML/MIN/1.73M2
GLUCOSE SERPL-MCNC: 89 MG/DL
HCT VFR BLD CALC: 37.9 %
HGB BLD-MCNC: 11.3 G/DL
MCHC RBC-ENTMCNC: 28.3 PG
MCHC RBC-ENTMCNC: 29.8 G/DL
MCV RBC AUTO: 95 FL
PLATELET # BLD AUTO: 260 K/UL
PMV BLD AUTO: 0 /100 WBCS
PMV BLD: 11.4 FL
POTASSIUM SERPL-SCNC: 4.2 MMOL/L
PROT SERPL-MCNC: 7.8 G/DL
RBC # BLD: 3.99 M/UL
RBC # FLD: 14.6 %
SODIUM SERPL-SCNC: 142 MMOL/L
WBC # FLD AUTO: 6 K/UL

## 2024-03-29 ENCOUNTER — APPOINTMENT (OUTPATIENT)
Dept: OTOLARYNGOLOGY | Facility: CLINIC | Age: 84
End: 2024-03-29

## 2024-03-30 PROBLEM — Z98.890 S/P BALLOON AORTIC VALVULOPLASTY: Status: ACTIVE | Noted: 2023-10-26

## 2024-03-30 PROBLEM — I35.0 NONRHEUMATIC AORTIC VALVE STENOSIS: Status: ACTIVE | Noted: 2022-11-28

## 2024-03-30 PROBLEM — I48.19 PERSISTENT ATRIAL FIBRILLATION: Status: ACTIVE | Noted: 2022-10-26

## 2024-03-30 PROBLEM — I34.0 NONRHEUMATIC MITRAL VALVE REGURGITATION: Status: ACTIVE | Noted: 2023-12-28

## 2024-03-30 NOTE — END OF VISIT
[FreeTextEntry3] : Presents with daughter.  Doing well post MitraClip.  ECHO reviewed: Mild LV dysfunction.  Normal TAVR function.  Mild to moderate residual MR (stable 2+ reduction).  Mild pulmonary hypertension.  Severe TR.  Mild RV dilatation and dysfunction.  Left-to-right shunt via iatrogenic ASD (MitraClip and Watchman).  Variably compliant with medication.  Difficult to qualify symptoms.  At times, she minimizes, others exaggerates.  Overall, seems to be doing well.  Has an apartment in [place].  Able to walk several blocks to the store and carry groceries back home.  Stopped every blocker to briefly for rest.  Importance of continuing dual antiplatelet therapy 1 year post PCI discussed. Resume Farxiga. Continue other cardiac medications. Follow-up with Dr. York. If symptomatic or persistent RV dysfunction after medical optimization, will consider TTVR.
PAST SURGICAL HISTORY:  History of cholecystectomy

## 2024-03-30 NOTE — ASSESSMENT
[FreeTextEntry1] : Plan: #S/P TAVR Aortic Valve Functioning Normally Continue Aspirin  #S/P Pilar clip Mild to Mod MR Labs today BP stable Restart Farxiga   #CAD Continue Aspirin and Plavix Once patient reached 1 year in PCI she may come off aspirin and stay on Plavix

## 2024-03-30 NOTE — HISTORY OF PRESENT ILLNESS
[FreeTextEntry1] : Mrs. Mcgee is a 82 YO F ex-smoker (quit 40 years ago) with PMHx of A.fib (off anticoagulation due to GIB, s/p Watchman), HTN, HLD, diverticulosis, non-erosive gastritis, aortic stenosis s/p  BAV, CAD s/p recent PCI to dLM/pLAD,  subsequent TAVR, mitral insufficiency hypothyroidism presented this admission for elective Mitral Clip. Within 2 hours post op patient started with hemoptysis and requiring return to OR for EGD and Bronchoscopy. Findings were a superficial abrasion of the trachea, which was treated with injections of epinephrine. Post-operatively, patient had an uneventful hospital course. She was discharged home on POD#2 in stable condition with instructions to follow up with the Heart Valve Clinic on 2/15/2024@1:30pm.  She arrives today for follow up. Not a good historian on medications. Does not have list with them.  Her symptoms vary by day. Is able to ambulate

## 2024-04-10 ENCOUNTER — RX RENEWAL (OUTPATIENT)
Age: 84
End: 2024-04-10

## 2024-05-08 ENCOUNTER — APPOINTMENT (OUTPATIENT)
Dept: OTOLARYNGOLOGY | Facility: CLINIC | Age: 84
End: 2024-05-08
Payer: MEDICARE

## 2024-05-08 DIAGNOSIS — R04.2 HEMOPTYSIS: ICD-10-CM

## 2024-05-08 DIAGNOSIS — R49.0 DYSPHONIA: ICD-10-CM

## 2024-05-08 DIAGNOSIS — J38.3 OTHER DISEASES OF VOCAL CORDS: ICD-10-CM

## 2024-05-08 DIAGNOSIS — K21.9 GASTRO-ESOPHAGEAL REFLUX DISEASE W/OUT ESOPHAGITIS: ICD-10-CM

## 2024-05-08 PROCEDURE — 31575 DIAGNOSTIC LARYNGOSCOPY: CPT

## 2024-05-08 PROCEDURE — 99214 OFFICE O/P EST MOD 30 MIN: CPT | Mod: 25

## 2024-05-08 RX ORDER — FAMOTIDINE 40 MG/1
40 TABLET, FILM COATED ORAL
Qty: 30 | Refills: 2 | Status: ACTIVE | COMMUNITY
Start: 2024-05-08 | End: 1900-01-01

## 2024-05-08 NOTE — PHYSICAL EXAM
[de-identified] : gilmer  [Normal] : mucosa is normal [Midline] : trachea located in midline position

## 2024-05-08 NOTE — HISTORY OF PRESENT ILLNESS
[de-identified] : Patient presents today for c/o hoarseness. She had heart surgery in Feb 2024 and after her surgery she was throwing up blood and was evaluated by ENT in the hospital. She has been doing better and is no longer getting blood form the throat, but she is getting mucus and hoarseness. Symptoms are getting better but not fully gone.

## 2024-05-08 NOTE — PROCEDURE
[Hoarseness] : hoarseness not clearly evaluated by indirect laryngoscopy [True Vocal Cords Erythematous] : bilateral true vocal cord edema [Glottis Arytenoid Cartilages Erythema] : bilateral arytenoid ~M erythema [Normal] : posterior cricoid area had healthy pink mucosa in the interarytenoid area and the esophageal inlet [de-identified] : hyperemia of left tvc, mild insufficiency

## 2024-05-09 NOTE — PHYSICAL THERAPY INITIAL EVALUATION ADULT - THERAPY FREQUENCY, PT EVAL
Problem: Urinary Tract Infection  Goal: Urinary Tract Infection (UTI) s/s resolved  Outcome: Monitoring/Evaluating progress  Goal: Verbalizes s/s of UTI and treatment plan  Description: Document on Patient Education Activity   Outcome: Monitoring/Evaluating progress     Problem: At Risk for Falls  Goal: Patient does not fall  Outcome: Monitoring/Evaluating progress  Goal: Patient takes action to control fall-related risks  Outcome: Monitoring/Evaluating progress     Problem: At Risk for Injury Due to Fall  Goal: Patient does not fall  Outcome: Monitoring/Evaluating progress  Goal: Takes action to control condition specific risks  Outcome: Monitoring/Evaluating progress  Goal: Verbalizes understanding of fall-related injury personal risks  Description: Document education using the patient education activity  Outcome: Monitoring/Evaluating progress     Problem: Impaired Physical Mobility  Goal: Functional status is maintained or returned to baseline during hospitalization  Outcome: Monitoring/Evaluating progress  Goal: Tolerates activity for discharge setting with no abnormal symptoms  Outcome: Monitoring/Evaluating progress     Problem: Fluid Volume Excess  Goal: Fluid Volume Excess Symptoms Resolved  Description: Treatment often consists of oxygen and respiratory support with diuretic therapy at doses that exceed usual dose (typically doubled).  Monitor patient response to treatment.    Acute dyspnea should resolve quickly if dose is adequate and kidney function is adequate. Dyspnea/SOB should only be observed with Activity after effective treatment. Patient should be able to lie down comfortably, without SOB.  Outcome: Monitoring/Evaluating progress  Goal: Oxygenation is maintained (SpO2 greater than or equal to 90% or as ordered)  Outcome: Monitoring/Evaluating progress  Goal: Verbalizes understanding of FVE management plan  Description: Document on Patient Education Activity  Outcome: Monitoring/Evaluating  progress      Pt presents independent with functional mobility without AD, can be d/c from b/s PT. Pt verbalized agreement.

## 2024-05-22 ENCOUNTER — APPOINTMENT (OUTPATIENT)
Dept: CARDIOLOGY | Facility: CLINIC | Age: 84
End: 2024-05-22
Payer: MEDICARE

## 2024-05-22 ENCOUNTER — RESULT CHARGE (OUTPATIENT)
Age: 84
End: 2024-05-22

## 2024-05-22 VITALS
BODY MASS INDEX: 36.14 KG/M2 | HEIGHT: 63 IN | DIASTOLIC BLOOD PRESSURE: 72 MMHG | HEART RATE: 83 BPM | SYSTOLIC BLOOD PRESSURE: 120 MMHG | WEIGHT: 204 LBS

## 2024-05-22 DIAGNOSIS — Z95.2 PRESENCE OF PROSTHETIC HEART VALVE: ICD-10-CM

## 2024-05-22 DIAGNOSIS — R21 RASH AND OTHER NONSPECIFIC SKIN ERUPTION: ICD-10-CM

## 2024-05-22 DIAGNOSIS — I36.1 NONRHEUMATIC TRICUSPID (VALVE) INSUFFICIENCY: ICD-10-CM

## 2024-05-22 DIAGNOSIS — Z95.818 OTHER SPECIFIED POSTPROCEDURAL STATES: ICD-10-CM

## 2024-05-22 DIAGNOSIS — I10 ESSENTIAL (PRIMARY) HYPERTENSION: ICD-10-CM

## 2024-05-22 DIAGNOSIS — Z98.890 OTHER SPECIFIED POSTPROCEDURAL STATES: ICD-10-CM

## 2024-05-22 DIAGNOSIS — Z95.5 PRESENCE OF CORONARY ANGIOPLASTY IMPLANT AND GRAFT: ICD-10-CM

## 2024-05-22 DIAGNOSIS — I25.118 ATHEROSCLEROTIC HEART DISEASE OF NATIVE CORONARY ARTERY WITH OTHER FORMS OF ANGINA PECTORIS: ICD-10-CM

## 2024-05-22 DIAGNOSIS — I50.22 CHRONIC SYSTOLIC (CONGESTIVE) HEART FAILURE: ICD-10-CM

## 2024-05-22 PROCEDURE — G2211 COMPLEX E/M VISIT ADD ON: CPT

## 2024-05-22 PROCEDURE — 99215 OFFICE O/P EST HI 40 MIN: CPT

## 2024-05-22 PROCEDURE — 93000 ELECTROCARDIOGRAM COMPLETE: CPT

## 2024-05-22 RX ORDER — ATORVASTATIN CALCIUM 40 MG/1
40 TABLET, FILM COATED ORAL
Refills: 0 | Status: DISCONTINUED | COMMUNITY
End: 2024-05-22

## 2024-05-22 RX ORDER — ASPIRIN 81 MG/1
81 TABLET, DELAYED RELEASE ORAL
Qty: 90 | Refills: 1 | Status: ACTIVE | COMMUNITY
Start: 2024-05-22 | End: 1900-01-01

## 2024-05-22 RX ORDER — METOPROLOL SUCCINATE 50 MG/1
50 TABLET, EXTENDED RELEASE ORAL
Qty: 90 | Refills: 2 | Status: ACTIVE | COMMUNITY
Start: 2024-03-14 | End: 1900-01-01

## 2024-05-22 RX ORDER — ALBUTEROL SULFATE 90 UG/1
108 (90 BASE) INHALANT RESPIRATORY (INHALATION)
Qty: 1 | Refills: 1 | Status: DISCONTINUED | COMMUNITY
Start: 2024-03-21 | End: 2024-05-22

## 2024-05-22 RX ORDER — FUROSEMIDE 40 MG/1
40 TABLET ORAL
Qty: 30 | Refills: 0 | Status: ACTIVE | COMMUNITY
Start: 2024-03-07 | End: 1900-01-01

## 2024-05-22 RX ORDER — ASPIRIN 81 MG/1
81 TABLET, CHEWABLE ORAL
Qty: 30 | Refills: 3 | Status: DISCONTINUED | COMMUNITY
End: 2024-05-22

## 2024-05-22 RX ORDER — PANTOPRAZOLE 40 MG/1
40 TABLET, DELAYED RELEASE ORAL
Qty: 30 | Refills: 3 | Status: DISCONTINUED | COMMUNITY
Start: 2024-05-08 | End: 2024-05-22

## 2024-05-22 NOTE — PHYSICAL EXAM
[Well Developed] : well developed [Well Nourished] : well nourished [No Acute Distress] : no acute distress [Normal Conjunctiva] : normal conjunctiva [Normal Venous Pressure] : normal venous pressure [No Carotid Bruit] : no carotid bruit [No Rub] : no rub [No Gallop] : no gallop [Murmur] : murmur [Clear Lung Fields] : clear lung fields [Good Air Entry] : good air entry [No Respiratory Distress] : no respiratory distress  [Soft] : abdomen soft [Non Tender] : non-tender [No Masses/organomegaly] : no masses/organomegaly [Normal Bowel Sounds] : normal bowel sounds [Normal Gait] : normal gait [No Cyanosis] : no cyanosis [No Clubbing] : no clubbing [No Varicosities] : no varicosities [No Rash] : no rash [No Skin Lesions] : no skin lesions [Moves all extremities] : moves all extremities [No Focal Deficits] : no focal deficits [Normal Speech] : normal speech [Alert and Oriented] : alert and oriented [Normal memory] : normal memory [de-identified] : 2/6 RK TYLER border [de-identified] : irr irr [de-identified] : 2+ pitting edema b/l

## 2024-05-22 NOTE — HISTORY OF PRESENT ILLNESS
[FreeTextEntry1] : 83 year old woman with HFmrEF, atrial fibrillation w/p Watchman, severe AS s/p TAVR, severe MR s/p MitraClip, CAD s/p PCI to dLM/pLAD, HTN, HLD and hypothyroidism who presents for follow up today.   Since we last saw each other, she has had a TAVR, MitraClip and Watchman device.   Today, she has shortness of breath with exertion. She has to stop and rest after one block. She also feels palpitations. She denies chest pain. She feels dizzy and has loss of balance but has not had syncope. She has leg swelling. She denies orthopnea and PND.   For her CAD, she is supposed to be on DAPT for one year after PCI which was in October 2023. She stopped aspirin because it makes her dizzy. She currently denies blood in her stool and urine.   For her atrial fibrillation, metoprolol was reduced from 100mg to 50 mg due to dizziness.   For her HFmrEF, she was supposed to be on lasix 40mg daily but her PCP cut it down to 20mg daily because her eGFR was slightly low although creatinine was normal at 1.1. She was told to go to a Nephrologist.

## 2024-05-22 NOTE — ASSESSMENT
[FreeTextEntry1] : 83 year old woman with HFmrEF, atrial fibrillation w/p Watchman, severe AS s/p TAVR, severe MR s/p MitraClip, CAD s/p PCI to dLM/pLAD, HTN, HLD and hypothyroidism who presents for follow up today.   1. HFmrEF: she is close to euvolemic on exam today, however endorsing dyspnea on exertion.  - Increase lasix to 40mg daily; check BMP in 1 month - Continue metoprolol and farxiga for GDMT; will not start ACE/ARB/ARNI as her potassium is slightly elevated and she is extremely non-compliant  2. CAD: s/p PCI of dLM/pLAD in October 2023.  - Again reiterated, as has been done in all her visits with Dr. Spaulding that she has to be on DAPT until October 2024. She does not want to take aspirin 81mg due to dizziness. The risks on not taking aspirin including that of heart attack and death were explained to the patient. She says she will restart. I told her to call me if there are bleeding issues.  - her LDL is 168 and her goal is <70. She refuses statin therapy despite risks and benefits explained.   3. s/p TAVR: Check echocardiogram in 3 months.   4. s/p MitraClip: with stable residual MR. Increase lasix as above.   5. Moderate to severe TR: with mild RV dilatation and reduced function.  - Increase lasix to 40mg daily - Check echocardiogram in 3 months - If signs and symptoms of RV failure, will need to refer for TTVR  The secondary prevention of heart disease was discussed in detail with the patient, including adhering to a heart healthy, plant based, or Mediterranean diet, and the importance of 30 minutes of moderate intensity activity for 30 minutes, 5 times a week. All the patient's questions were answered.  RTC in 3 months.

## 2024-05-22 NOTE — CARDIOLOGY SUMMARY
[de-identified] : 5/22/24: atrial fibrillation 6/22/23: atrial fibrillation [de-identified] : 3/21/24: 1. Left ventricular cavity is normal in size. Left ventricular systolic function is mildly decreased with an ejection fraction of 47 % by Gaytan's method of disks. 2. Mildly enlarged right ventricular cavity size and borderline reduced systolic function. 3. The left atrium is moderately dilated. 4. The right atrium is dilated. 5. Percutaneous myqv-qy-bmzy mitral repair device with a Mitraclip. There is mild to moderate intravalvular mitral regurgitation. The transmitral peak gradient is 13.4 mmHg and mean gradient is 5.01 mmHg at a heart rate of 90 bpm. 6. 29 mm Medtronic Evolute Pro + in the Aortic position. Appropriately positioned well expanded valve. Peak transvalvular velocity is 2.1 m/s. Peak/mean transvalvular pressure gradient is 17/10 mmHg. Aortic valve area by VTI 1.25 cm2. No evidence of significant valvular or paravalvular regurgitation. Findings are within normal range for the above-mentioned valve. 7. Moderate to severe tricuspid regurgitation. 8. Atrial septal defect. There is left to right shunting through the interatrial septum. 9. Estimated pulmonary artery systolic pressure is 47 mmHg, consistent with mild pulmonary hypertension. 10. Compared to the transthoracic echocardiogram performed on 2/7/2024, the LVEF has decreased.  3/23/23: 1. Normal left ventricular cavity size. The left ventricular systolic function is normal with an ejection fraction of 62 %. There is severe (grade 3) left ventricular diastolic dysfunction. There is increased LV mass and concentric hypertrophy. 2. Mildly enlarged right ventricular cavity size and normal systolic function. 3. Biatrial dilatation.  4. Moderate mitral regurgitation. 5. Severe aortic stenosis. Mild aortic regurgitation. 6. Moderate severe tricuspid regurgitation. 7. There is color flow across the septum, most likely consistent with a small PFO. 8. Estimated pulmonary artery systolic pressure is 52 mmHg, consistent with moderate pulmonary hypertension.  9. Compared to the transthoracic echocardiogram performed on 11/2/2022 the aortic valve area has decreased. There is now moderate mitral regurgitation and moderate to severe tricuspid regurgitation    11/2/22:  1. Normal left ventricular size and systolic function. The LVEF is 60%. There is mild LVH.  2. Biatrial dilatation.  3. Mild MR.  4. Moderate to severe AS. 5. Mildly elevated PASP.

## 2024-05-28 RX ORDER — CLOPIDOGREL BISULFATE 75 MG/1
75 TABLET, FILM COATED ORAL DAILY
Qty: 90 | Refills: 3 | Status: ACTIVE | COMMUNITY
Start: 1900-01-01 | End: 1900-01-01

## 2024-06-11 NOTE — ED PROVIDER NOTE - PHYSICAL EXAMINATION
LM for pt offering sooner appt 6/12 at 3:30 with Dr. Nunez if still available  
CONSTITUTIONAL: Well-appearing; well-nourished; in no apparent distress.   EYES: PERRL; EOM intact.   ENT: normal nose; no rhinorrhea; normal pharynx with no tonsillar hypertrophy.   NECK: Supple; non-tender; no cervical lymphadenopathy. No JVD. gallops. Equal radial pulses  RESPIRATORY: Normal chest excursion with respiration; breath sounds clear and equal bilaterally; no wheezes, rhonchi, or rales.  GI/: Normal bowel sounds; non-distended; non-tender; no palpable organomegaly.   MS: No evidence of trauma or deformity. Normal ROM in all four extremities; non-tender to palpation; distal pulses are normal.   Extrem: + b/l peripheral pitting edema. No calf ttp  SKIN: Normal for age and race; warm; dry; good turgor; no apparent lesions or exudate.   NEURO/PSYCH: A & O x 4; grossly unremarkable. mood and manner are appropriate.

## 2024-07-10 ENCOUNTER — APPOINTMENT (OUTPATIENT)
Dept: OTOLARYNGOLOGY | Facility: CLINIC | Age: 84
End: 2024-07-10

## 2024-08-28 ENCOUNTER — APPOINTMENT (OUTPATIENT)
Dept: CARDIOLOGY | Facility: CLINIC | Age: 84
End: 2024-08-28
Payer: MEDICARE

## 2024-08-28 VITALS
WEIGHT: 200 LBS | HEART RATE: 84 BPM | OXYGEN SATURATION: 96 % | BODY MASS INDEX: 35.44 KG/M2 | HEIGHT: 63 IN | SYSTOLIC BLOOD PRESSURE: 110 MMHG | DIASTOLIC BLOOD PRESSURE: 68 MMHG

## 2024-08-28 DIAGNOSIS — Z95.818 OTHER SPECIFIED POSTPROCEDURAL STATES: ICD-10-CM

## 2024-08-28 DIAGNOSIS — I36.1 NONRHEUMATIC TRICUSPID (VALVE) INSUFFICIENCY: ICD-10-CM

## 2024-08-28 DIAGNOSIS — I50.22 CHRONIC SYSTOLIC (CONGESTIVE) HEART FAILURE: ICD-10-CM

## 2024-08-28 DIAGNOSIS — I48.19 OTHER PERSISTENT ATRIAL FIBRILLATION: ICD-10-CM

## 2024-08-28 DIAGNOSIS — Z01.810 ENCOUNTER FOR PREPROCEDURAL CARDIOVASCULAR EXAMINATION: ICD-10-CM

## 2024-08-28 DIAGNOSIS — Z95.2 PRESENCE OF PROSTHETIC HEART VALVE: ICD-10-CM

## 2024-08-28 DIAGNOSIS — I10 ESSENTIAL (PRIMARY) HYPERTENSION: ICD-10-CM

## 2024-08-28 DIAGNOSIS — Z98.890 OTHER SPECIFIED POSTPROCEDURAL STATES: ICD-10-CM

## 2024-08-28 DIAGNOSIS — I25.118 ATHEROSCLEROTIC HEART DISEASE OF NATIVE CORONARY ARTERY WITH OTHER FORMS OF ANGINA PECTORIS: ICD-10-CM

## 2024-08-28 PROCEDURE — 99214 OFFICE O/P EST MOD 30 MIN: CPT

## 2024-08-28 PROCEDURE — 93000 ELECTROCARDIOGRAM COMPLETE: CPT

## 2024-08-28 PROCEDURE — G2211 COMPLEX E/M VISIT ADD ON: CPT

## 2024-08-28 RX ORDER — CHOLECALCIFEROL (VITAMIN D3) 125 MCG
5000 CAPSULE ORAL
Refills: 0 | Status: ACTIVE | COMMUNITY

## 2024-08-28 RX ORDER — AZITHROMYCIN 500 MG/1
500 TABLET, FILM COATED ORAL
Qty: 1 | Refills: 0 | Status: ACTIVE | COMMUNITY
Start: 2024-08-28 | End: 1900-01-01

## 2024-08-28 RX ORDER — VIT A/VIT C/VIT E/ZINC/COPPER 4296-226
CAPSULE ORAL
Refills: 0 | Status: ACTIVE | COMMUNITY

## 2024-08-28 RX ORDER — FUROSEMIDE 40 MG/1
40 TABLET ORAL DAILY
Refills: 0 | Status: ACTIVE | COMMUNITY

## 2024-08-28 NOTE — CARDIOLOGY SUMMARY
[de-identified] : 8/28/24: atrial fibrillation, LBBB 5/22/24: atrial fibrillation 6/22/23: atrial fibrillation [de-identified] : 3/21/24: 1. Left ventricular cavity is normal in size. Left ventricular systolic function is mildly decreased with an ejection fraction of 47 % by Gaytan's method of disks. 2. Mildly enlarged right ventricular cavity size and borderline reduced systolic function. 3. The left atrium is moderately dilated. 4. The right atrium is dilated. 5. Percutaneous qlkb-rq-wajm mitral repair device with a Mitraclip. There is mild to moderate intravalvular mitral regurgitation. The transmitral peak gradient is 13.4 mmHg and mean gradient is 5.01 mmHg at a heart rate of 90 bpm. 6. 29 mm Medtronic Evolute Pro + in the Aortic position. Appropriately positioned well expanded valve. Peak transvalvular velocity is 2.1 m/s. Peak/mean transvalvular pressure gradient is 17/10 mmHg. Aortic valve area by VTI 1.25 cm2. No evidence of significant valvular or paravalvular regurgitation. Findings are within normal range for the above-mentioned valve. 7. Moderate to severe tricuspid regurgitation. 8. Atrial septal defect. There is left to right shunting through the interatrial septum. 9. Estimated pulmonary artery systolic pressure is 47 mmHg, consistent with mild pulmonary hypertension. 10. Compared to the transthoracic echocardiogram performed on 2/7/2024, the LVEF has decreased.  3/23/23: 1. Normal left ventricular cavity size. The left ventricular systolic function is normal with an ejection fraction of 62 %. There is severe (grade 3) left ventricular diastolic dysfunction. There is increased LV mass and concentric hypertrophy. 2. Mildly enlarged right ventricular cavity size and normal systolic function. 3. Biatrial dilatation.  4. Moderate mitral regurgitation. 5. Severe aortic stenosis. Mild aortic regurgitation. 6. Moderate severe tricuspid regurgitation. 7. There is color flow across the septum, most likely consistent with a small PFO. 8. Estimated pulmonary artery systolic pressure is 52 mmHg, consistent with moderate pulmonary hypertension.  9. Compared to the transthoracic echocardiogram performed on 11/2/2022 the aortic valve area has decreased. There is now moderate mitral regurgitation and moderate to severe tricuspid regurgitation    11/2/22:  1. Normal left ventricular size and systolic function. The LVEF is 60%. There is mild LVH.  2. Biatrial dilatation.  3. Mild MR.  4. Moderate to severe AS. 5. Mildly elevated PASP.

## 2024-08-28 NOTE — HISTORY OF PRESENT ILLNESS
[FreeTextEntry1] : 83 year old woman with HFmrEF, atrial fibrillation w/p Watchman, severe AS s/p TAVR, severe MR s/p MitraClip, CAD s/p PCI to dLM/pLAD, HTN, HLD and hypothyroidism who presents for follow up today.    Today, she has shortness of breath with exertion. She has to stop and rest after one block. She also feels palpitations. She denies chest pain. She feels dizzy and has loss of balance but has not had syncope. She has leg swelling. She denies orthopnea and PND.   For her CAD, she is supposed to be on DAPT for one year after PCI which was in October 2023. She stopped aspirin because it makes her dizzy. She currently denies blood in her stool and urine.   For her atrial fibrillation, metoprolol was reduced from 100mg to 50 mg due to dizziness. She is no longer dizzy but feels a loss of balance.  For her HFmrEF, she was supposed to be on lasix 40mg daily but her PCP cut it down to 20mg daily because her eGFR was slightly low although creatinine was normal at 1.1. She was told to go to a Nephrologist.   She is here for evaluation prior to dental surgery.

## 2024-08-28 NOTE — PHYSICAL EXAM
[Well Developed] : well developed [Well Nourished] : well nourished [No Acute Distress] : no acute distress [Normal Conjunctiva] : normal conjunctiva [Normal Venous Pressure] : normal venous pressure [No Carotid Bruit] : no carotid bruit [No Rub] : no rub [No Gallop] : no gallop [Murmur] : murmur [Clear Lung Fields] : clear lung fields [Good Air Entry] : good air entry [No Respiratory Distress] : no respiratory distress  [Soft] : abdomen soft [Non Tender] : non-tender [No Masses/organomegaly] : no masses/organomegaly [Normal Bowel Sounds] : normal bowel sounds [Normal Gait] : normal gait [No Cyanosis] : no cyanosis [No Clubbing] : no clubbing [No Varicosities] : no varicosities [No Rash] : no rash [No Skin Lesions] : no skin lesions [Moves all extremities] : moves all extremities [No Focal Deficits] : no focal deficits [Normal Speech] : normal speech [Alert and Oriented] : alert and oriented [Normal memory] : normal memory [de-identified] : 2/6 RK TYLER border [de-identified] : irr irr [de-identified] : 2+ pitting edema b/l

## 2024-08-28 NOTE — ASSESSMENT
[FreeTextEntry1] : 83 year old woman with HFmrEF, atrial fibrillation w/p Watchman, severe AS s/p TAVR, severe MR s/p MitraClip, CAD s/p PCI to dLM/pLAD, HTN, HLD and hypothyroidism who presents for follow up today.   1. HFmrEF: she is close to euvolemic on exam today, however endorsing dyspnea on exertion.  - Continue Lasix 40mg daily; they did not get a current BMP - Continue metoprolol and farxiga for GDMT; will not start ACE/ARB/ARNI as her potassium is slightly elevated and she is extremely non-compliant  2. CAD: s/p PCI of dLM/pLAD in October 2023.  - Again reiterated, as has been done in all her visits with Dr. Spaulding that she has to be on DAPT until October 2024.  - her LDL is 168 and her goal is <70. She refuses statin therapy despite risks and benefits explained.   3. s/p TAVR: Check echocardiogram in 3 months.   4. s/p MitraClip: with stable residual MR.  5. Moderate to severe TR: with mild RV dilatation and reduced function.  - Continue lasix to 40mg daily - Check echocardiogram in 3 months - If signs and symptoms of RV failure, will need to refer for TTVR  6. Preoperative evaluation: for dental surgery, but only with local anesthesia which is low risk. She is at elevated risk for any procedure/surgery due to her extensive cardiac history. However, she does not need any further cardiac testing before proceeding with this low risk surgery. She can stop Plavix for 5 days prior to surgery. She requires dental prophylaxis due to TAVR and MitraClip and she has a PCN allergy so I sent Azithromycin 500mg x1 to be taken 30-60 minutes prior to surgery.   The secondary prevention of heart disease was discussed in detail with the patient, including adhering to a heart healthy, plant based, or Mediterranean diet, and the importance of 30 minutes of moderate intensity activity for 30 minutes, 5 times a week. All the patient's questions were answered.  RTC in 1 month.

## 2024-09-18 ENCOUNTER — APPOINTMENT (OUTPATIENT)
Dept: CARDIOLOGY | Facility: CLINIC | Age: 84
End: 2024-09-18
Payer: MEDICARE

## 2024-09-18 VITALS
HEART RATE: 84 BPM | SYSTOLIC BLOOD PRESSURE: 130 MMHG | WEIGHT: 205 LBS | HEIGHT: 63 IN | BODY MASS INDEX: 36.32 KG/M2 | DIASTOLIC BLOOD PRESSURE: 78 MMHG

## 2024-09-18 DIAGNOSIS — I36.1 NONRHEUMATIC TRICUSPID (VALVE) INSUFFICIENCY: ICD-10-CM

## 2024-09-18 DIAGNOSIS — I10 ESSENTIAL (PRIMARY) HYPERTENSION: ICD-10-CM

## 2024-09-18 DIAGNOSIS — I34.0 NONRHEUMATIC MITRAL (VALVE) INSUFFICIENCY: ICD-10-CM

## 2024-09-18 DIAGNOSIS — I48.19 OTHER PERSISTENT ATRIAL FIBRILLATION: ICD-10-CM

## 2024-09-18 DIAGNOSIS — I25.118 ATHEROSCLEROTIC HEART DISEASE OF NATIVE CORONARY ARTERY WITH OTHER FORMS OF ANGINA PECTORIS: ICD-10-CM

## 2024-09-18 DIAGNOSIS — I35.0 NONRHEUMATIC AORTIC (VALVE) STENOSIS: ICD-10-CM

## 2024-09-18 DIAGNOSIS — I50.22 CHRONIC SYSTOLIC (CONGESTIVE) HEART FAILURE: ICD-10-CM

## 2024-09-18 PROCEDURE — 93000 ELECTROCARDIOGRAM COMPLETE: CPT

## 2024-09-18 PROCEDURE — G2211 COMPLEX E/M VISIT ADD ON: CPT

## 2024-09-18 PROCEDURE — 99214 OFFICE O/P EST MOD 30 MIN: CPT

## 2024-09-18 NOTE — HISTORY OF PRESENT ILLNESS
[FreeTextEntry1] : 83 year old woman with HFmrEF, atrial fibrillation w/p Watchman, severe AS s/p TAVR, severe MR s/p MitraClip, CAD s/p PCI to dLM/pLAD, HTN, HLD and hypothyroidism who presents for follow up today.    Today, she has shortness of breath with exertion. She has to stop and rest after one block. She also feels palpitations. She denies chest pain. She feels dizzy and has loss of balance but has not had syncope. She has leg swelling. She denies orthopnea and PND. When she moves her head in the bed, she feels dizzy.  For her CAD, she is supposed to be on DAPT for one year after PCI which was in October 2023. She stopped aspirin because it makes her dizzy. She currently denies blood in her stool and urine.   For her atrial fibrillation, metoprolol was reduced from 100mg to 50 mg due to dizziness.   For her HFmrEF, she is supposed to be on Lasix 40mg daily. She is taking it every day. Weight is stable.   She is here for evaluation prior to dental surgery.

## 2024-09-18 NOTE — PHYSICAL EXAM
[Well Developed] : well developed [Well Nourished] : well nourished [No Acute Distress] : no acute distress [Normal Conjunctiva] : normal conjunctiva [Normal Venous Pressure] : normal venous pressure [No Carotid Bruit] : no carotid bruit [No Rub] : no rub [No Gallop] : no gallop [Murmur] : murmur [Clear Lung Fields] : clear lung fields [Good Air Entry] : good air entry [No Respiratory Distress] : no respiratory distress  [Soft] : abdomen soft [Non Tender] : non-tender [No Masses/organomegaly] : no masses/organomegaly [Normal Bowel Sounds] : normal bowel sounds [Normal Gait] : normal gait [No Cyanosis] : no cyanosis [No Clubbing] : no clubbing [No Varicosities] : no varicosities [No Rash] : no rash [No Skin Lesions] : no skin lesions [Moves all extremities] : moves all extremities [No Focal Deficits] : no focal deficits [Normal Speech] : normal speech [Alert and Oriented] : alert and oriented [Normal memory] : normal memory [de-identified] : 2/6 RK TYLER border [de-identified] : irr irr [de-identified] : 2+ pitting edema b/l

## 2024-09-18 NOTE — CARDIOLOGY SUMMARY
[de-identified] : 9/18/24: atrial fibrillation, LBBB 8/28/24: atrial fibrillation, LBBB 5/22/24: atrial fibrillation 6/22/23: atrial fibrillation [de-identified] : 3/21/24: 1. Left ventricular cavity is normal in size. Left ventricular systolic function is mildly decreased with an ejection fraction of 47 % by Gaytan's method of disks. 2. Mildly enlarged right ventricular cavity size and borderline reduced systolic function. 3. The left atrium is moderately dilated. 4. The right atrium is dilated. 5. Percutaneous wnda-tz-nwif mitral repair device with a Mitraclip. There is mild to moderate intravalvular mitral regurgitation. The transmitral peak gradient is 13.4 mmHg and mean gradient is 5.01 mmHg at a heart rate of 90 bpm. 6. 29 mm Medtronic Evolute Pro + in the Aortic position. Appropriately positioned well expanded valve. Peak transvalvular velocity is 2.1 m/s. Peak/mean transvalvular pressure gradient is 17/10 mmHg. Aortic valve area by VTI 1.25 cm2. No evidence of significant valvular or paravalvular regurgitation. Findings are within normal range for the above-mentioned valve. 7. Moderate to severe tricuspid regurgitation. 8. Atrial septal defect. There is left to right shunting through the interatrial septum. 9. Estimated pulmonary artery systolic pressure is 47 mmHg, consistent with mild pulmonary hypertension. 10. Compared to the transthoracic echocardiogram performed on 2/7/2024, the LVEF has decreased.  3/23/23: 1. Normal left ventricular cavity size. The left ventricular systolic function is normal with an ejection fraction of 62 %. There is severe (grade 3) left ventricular diastolic dysfunction. There is increased LV mass and concentric hypertrophy. 2. Mildly enlarged right ventricular cavity size and normal systolic function. 3. Biatrial dilatation.  4. Moderate mitral regurgitation. 5. Severe aortic stenosis. Mild aortic regurgitation. 6. Moderate severe tricuspid regurgitation. 7. There is color flow across the septum, most likely consistent with a small PFO. 8. Estimated pulmonary artery systolic pressure is 52 mmHg, consistent with moderate pulmonary hypertension.  9. Compared to the transthoracic echocardiogram performed on 11/2/2022 the aortic valve area has decreased. There is now moderate mitral regurgitation and moderate to severe tricuspid regurgitation    11/2/22:  1. Normal left ventricular size and systolic function. The LVEF is 60%. There is mild LVH.  2. Biatrial dilatation.  3. Mild MR.  4. Moderate to severe AS. 5. Mildly elevated PASP.

## 2024-09-18 NOTE — ASSESSMENT
[FreeTextEntry1] : 83 year old woman with HFmrEF, atrial fibrillation w/p Watchman, severe AS s/p TAVR, severe MR s/p MitraClip, CAD s/p PCI to dLM/pLAD, HTN, HLD and hypothyroidism who presents for follow up today.   1. HFmrEF: she is close to euvolemic on exam today, however endorsing dyspnea on exertion.  - Continue Lasix 40mg daily; Creatinine is stable.  - Continue metoprolol for GDMT; will not start ACE/ARB/ARNI as her potassium is slightly elevated and she is extremely non-compliant  2. CAD: s/p PCI of dLM/pLAD in October 2023.  - Again reiterated, as has been done in all her visits with Dr. Spaulding that she has to be on DAPT until October 2024.  - her LDL is 168 and her goal is <70. She refuses statin therapy despite risks and benefits explained.   3. s/p TAVR: Check echocardiogram in 3 months.   4. s/p MitraClip: with stable residual MR.  5. Moderate to severe TR: with mild RV dilatation and reduced function.  - Continue lasix to 40mg daily - Check echocardiogram in 3 months - If signs and symptoms of RV failure, will need to refer for TTVR  6. Preoperative evaluation: for dental surgery, but only with local anesthesia which is low risk. She is at elevated risk for any procedure/surgery due to her extensive cardiac history. However, she does not need any further cardiac testing before proceeding with this low risk surgery. She can stop Plavix for 5 days prior to surgery. She requires dental prophylaxis due to TAVR and MitraClip and she has a PCN allergy so I sent Azithromycin 500mg x1 to be taken 30-60 minutes prior to surgery.   The secondary prevention of heart disease was discussed in detail with the patient, including adhering to a heart healthy, plant based, or Mediterranean diet, and the importance of 30 minutes of moderate intensity activity for 30 minutes, 5 times a week. All the patient's questions were answered.  RTC in 6 months.

## 2024-11-25 NOTE — H&P ADULT - BIRTH SEX
Medication: clopidogrel (PLAVIX) 75 MG tablet   Last office visit date: 08/19/2022  Medication Refill Protocol Failed.  Failed criteria: chart review. Sent to clinician to review.      Plavix Refill Protocol - 12 Month Protocol [id number] 09:10 PM   Protocol Details Normal HGB in past 12 months looking at last value -- IF CRITERIA FAILED REFER TO PROTOCOL DETAILS    PLT greater than 100 in past 12 months looking at last value -- IF CRITERIA FAILED REFER TO PROTOCOL DETAILS      Female

## 2024-12-10 ENCOUNTER — NON-APPOINTMENT (OUTPATIENT)
Age: 84
End: 2024-12-10

## 2025-01-10 ENCOUNTER — APPOINTMENT (OUTPATIENT)
Dept: CARDIOTHORACIC SURGERY | Facility: CLINIC | Age: 85
End: 2025-01-10

## 2025-01-10 ENCOUNTER — APPOINTMENT (OUTPATIENT)
Dept: CARDIOLOGY | Facility: CLINIC | Age: 85
End: 2025-01-10

## 2025-01-10 ENCOUNTER — NON-APPOINTMENT (OUTPATIENT)
Age: 85
End: 2025-01-10

## 2025-01-10 DIAGNOSIS — Z95.818 OTHER SPECIFIED POSTPROCEDURAL STATES: ICD-10-CM

## 2025-01-10 DIAGNOSIS — I25.118 ATHEROSCLEROTIC HEART DISEASE OF NATIVE CORONARY ARTERY WITH OTHER FORMS OF ANGINA PECTORIS: ICD-10-CM

## 2025-01-10 DIAGNOSIS — Z98.890 OTHER SPECIFIED POSTPROCEDURAL STATES: ICD-10-CM

## 2025-01-10 DIAGNOSIS — I50.22 CHRONIC SYSTOLIC (CONGESTIVE) HEART FAILURE: ICD-10-CM

## 2025-01-10 DIAGNOSIS — Z95.2 PRESENCE OF PROSTHETIC HEART VALVE: ICD-10-CM

## 2025-01-10 PROCEDURE — 93306 TTE W/DOPPLER COMPLETE: CPT

## 2025-01-23 ENCOUNTER — APPOINTMENT (OUTPATIENT)
Dept: CARDIOLOGY | Facility: CLINIC | Age: 85
End: 2025-01-23

## 2025-02-06 ENCOUNTER — APPOINTMENT (OUTPATIENT)
Dept: CARDIOLOGY | Facility: CLINIC | Age: 85
End: 2025-02-06

## 2025-02-12 ENCOUNTER — NON-APPOINTMENT (OUTPATIENT)
Age: 85
End: 2025-02-12

## 2025-03-03 ENCOUNTER — APPOINTMENT (OUTPATIENT)
Dept: CARDIOLOGY | Facility: CLINIC | Age: 85
End: 2025-03-03

## 2025-03-13 ENCOUNTER — APPOINTMENT (OUTPATIENT)
Dept: CARDIOLOGY | Facility: CLINIC | Age: 85
End: 2025-03-13

## 2025-03-13 VITALS
DIASTOLIC BLOOD PRESSURE: 72 MMHG | TEMPERATURE: 98 F | HEIGHT: 63 IN | RESPIRATION RATE: 16 BRPM | OXYGEN SATURATION: 95 % | WEIGHT: 204 LBS | SYSTOLIC BLOOD PRESSURE: 113 MMHG | HEART RATE: 76 BPM | BODY MASS INDEX: 36.14 KG/M2

## 2025-03-13 PROCEDURE — 99214 OFFICE O/P EST MOD 30 MIN: CPT

## 2025-03-13 RX ORDER — FUROSEMIDE 20 MG/1
20 TABLET ORAL DAILY
Refills: 0 | Status: ACTIVE | COMMUNITY

## 2025-03-14 LAB
ALBUMIN SERPL ELPH-MCNC: 4.4 G/DL
ALP BLD-CCNC: 121 U/L
ALT SERPL-CCNC: 16 U/L
ANION GAP SERPL CALC-SCNC: 8 MMOL/L
AST SERPL-CCNC: 23 U/L
BILIRUB SERPL-MCNC: 0.8 MG/DL
BUN SERPL-MCNC: 22 MG/DL
CALCIUM SERPL-MCNC: 9.3 MG/DL
CHLORIDE SERPL-SCNC: 99 MMOL/L
CHOLEST SERPL-MCNC: 239 MG/DL
CO2 SERPL-SCNC: 32 MMOL/L
CREAT SERPL-MCNC: 1 MG/DL
EGFRCR SERPLBLD CKD-EPI 2021: 55 ML/MIN/1.73M2
GLUCOSE SERPL-MCNC: 81 MG/DL
HDLC SERPL-MCNC: 45 MG/DL
LDLC SERPL CALC-MCNC: 144 MG/DL
NONHDLC SERPL-MCNC: 194 MG/DL
POTASSIUM SERPL-SCNC: 5.1 MMOL/L
PROT SERPL-MCNC: 7.7 G/DL
SODIUM SERPL-SCNC: 139 MMOL/L
TRIGL SERPL-MCNC: 273 MG/DL

## 2025-03-17 ENCOUNTER — APPOINTMENT (OUTPATIENT)
Dept: CARDIOLOGY | Facility: CLINIC | Age: 85
End: 2025-03-17
